# Patient Record
Sex: MALE | Race: WHITE | NOT HISPANIC OR LATINO | Employment: FULL TIME | URBAN - METROPOLITAN AREA
[De-identification: names, ages, dates, MRNs, and addresses within clinical notes are randomized per-mention and may not be internally consistent; named-entity substitution may affect disease eponyms.]

---

## 2023-08-28 ENCOUNTER — HOSPITAL ENCOUNTER (INPATIENT)
Facility: HOSPITAL | Age: 48
LOS: 2 days | Discharge: HOME | DRG: 247 | End: 2023-08-30
Attending: EMERGENCY MEDICINE | Admitting: INTERNAL MEDICINE
Payer: MEDICAID

## 2023-08-28 DIAGNOSIS — Z95.5 S/P CORONARY ARTERY STENT PLACEMENT: ICD-10-CM

## 2023-08-28 DIAGNOSIS — I21.3 ST ELEVATION MYOCARDIAL INFARCTION (STEMI), UNSPECIFIED ARTERY: ICD-10-CM

## 2023-08-28 DIAGNOSIS — F17.200 TOBACCO DEPENDENCE: ICD-10-CM

## 2023-08-28 DIAGNOSIS — I21.3 STEMI (ST ELEVATION MYOCARDIAL INFARCTION): Primary | ICD-10-CM

## 2023-08-28 DIAGNOSIS — R07.9 CHEST PAIN: ICD-10-CM

## 2023-08-28 DIAGNOSIS — I21.11 ST ELEVATION MYOCARDIAL INFARCTION INVOLVING RIGHT CORONARY ARTERY: ICD-10-CM

## 2023-08-28 DIAGNOSIS — I25.110 CORONARY ARTERY DISEASE INVOLVING NATIVE CORONARY ARTERY OF NATIVE HEART WITH UNSTABLE ANGINA PECTORIS: ICD-10-CM

## 2023-08-28 PROBLEM — R06.2 WHEEZING: Status: ACTIVE | Noted: 2023-08-28

## 2023-08-28 PROBLEM — Z72.0 TOBACCO USE: Status: ACTIVE | Noted: 2023-08-28

## 2023-08-28 LAB
ALBUMIN SERPL BCP-MCNC: 3.5 G/DL (ref 3.5–5.2)
ALP SERPL-CCNC: 64 U/L (ref 55–135)
ALT SERPL W/O P-5'-P-CCNC: 20 U/L (ref 10–44)
ANION GAP SERPL CALC-SCNC: 10 MMOL/L (ref 8–16)
APTT PPP: 23.9 SEC (ref 21–32)
AST SERPL-CCNC: 13 U/L (ref 10–40)
BASOPHILS # BLD AUTO: 0.06 K/UL (ref 0–0.2)
BASOPHILS NFR BLD: 0.4 % (ref 0–1.9)
BILIRUB SERPL-MCNC: 0.3 MG/DL (ref 0.1–1)
BNP SERPL-MCNC: <10 PG/ML (ref 0–99)
BUN SERPL-MCNC: 11 MG/DL (ref 6–20)
CALCIUM SERPL-MCNC: 7.8 MG/DL (ref 8.7–10.5)
CATH EF ESTIMATED: 45 %
CATH EF QUANTITATIVE: 45 %
CHLORIDE SERPL-SCNC: 106 MMOL/L (ref 95–110)
CK SERPL-CCNC: 72 U/L (ref 20–200)
CO2 SERPL-SCNC: 22 MMOL/L (ref 23–29)
CREAT SERPL-MCNC: 1 MG/DL (ref 0.5–1.4)
DIFFERENTIAL METHOD: ABNORMAL
EOSINOPHIL # BLD AUTO: 0.2 K/UL (ref 0–0.5)
EOSINOPHIL NFR BLD: 1.3 % (ref 0–8)
ERYTHROCYTE [DISTWIDTH] IN BLOOD BY AUTOMATED COUNT: 13.4 % (ref 11.5–14.5)
EST. GFR  (NO RACE VARIABLE): >60 ML/MIN/1.73 M^2
GLUCOSE SERPL-MCNC: 127 MG/DL (ref 70–110)
HCT VFR BLD AUTO: 50.3 % (ref 40–54)
HGB BLD-MCNC: 17.2 G/DL (ref 14–18)
IMM GRANULOCYTES # BLD AUTO: 0.13 K/UL (ref 0–0.04)
IMM GRANULOCYTES NFR BLD AUTO: 0.8 % (ref 0–0.5)
INR PPP: 1 (ref 0.8–1.2)
LYMPHOCYTES # BLD AUTO: 3.5 K/UL (ref 1–4.8)
LYMPHOCYTES NFR BLD: 21.1 % (ref 18–48)
MCH RBC QN AUTO: 32.1 PG (ref 27–31)
MCHC RBC AUTO-ENTMCNC: 34.2 G/DL (ref 32–36)
MCV RBC AUTO: 94 FL (ref 82–98)
MONOCYTES # BLD AUTO: 1.7 K/UL (ref 0.3–1)
MONOCYTES NFR BLD: 10.1 % (ref 4–15)
NEUTROPHILS # BLD AUTO: 10.9 K/UL (ref 1.8–7.7)
NEUTROPHILS NFR BLD: 66.3 % (ref 38–73)
NRBC BLD-RTO: 0 /100 WBC
PLATELET # BLD AUTO: 246 K/UL (ref 150–450)
PMV BLD AUTO: 9.7 FL (ref 9.2–12.9)
POC ACTIVATED CLOTTING TIME K: 257 SEC (ref 74–137)
POTASSIUM SERPL-SCNC: 3.7 MMOL/L (ref 3.5–5.1)
PROT SERPL-MCNC: 6.3 G/DL (ref 6–8.4)
PROTHROMBIN TIME: 10.6 SEC (ref 9–12.5)
RBC # BLD AUTO: 5.36 M/UL (ref 4.6–6.2)
SAMPLE: ABNORMAL
SODIUM SERPL-SCNC: 138 MMOL/L (ref 136–145)
TROPONIN I SERPL DL<=0.01 NG/ML-MCNC: 0.03 NG/ML (ref 0–0.03)
TSH SERPL DL<=0.005 MIU/L-ACNC: 1.75 UIU/ML (ref 0.4–4)
WBC # BLD AUTO: 16.41 K/UL (ref 3.9–12.7)

## 2023-08-28 PROCEDURE — 99152 MOD SED SAME PHYS/QHP 5/>YRS: CPT | Performed by: INTERNAL MEDICINE

## 2023-08-28 PROCEDURE — 85730 THROMBOPLASTIN TIME PARTIAL: CPT | Performed by: EMERGENCY MEDICINE

## 2023-08-28 PROCEDURE — 63600175 PHARM REV CODE 636 W HCPCS: Performed by: INTERNAL MEDICINE

## 2023-08-28 PROCEDURE — 84484 ASSAY OF TROPONIN QUANT: CPT | Performed by: EMERGENCY MEDICINE

## 2023-08-28 PROCEDURE — 25000003 PHARM REV CODE 250: Performed by: EMERGENCY MEDICINE

## 2023-08-28 PROCEDURE — 94640 AIRWAY INHALATION TREATMENT: CPT

## 2023-08-28 PROCEDURE — C1894 INTRO/SHEATH, NON-LASER: HCPCS | Performed by: INTERNAL MEDICINE

## 2023-08-28 PROCEDURE — 25500020 PHARM REV CODE 255: Performed by: INTERNAL MEDICINE

## 2023-08-28 PROCEDURE — 83880 ASSAY OF NATRIURETIC PEPTIDE: CPT | Performed by: EMERGENCY MEDICINE

## 2023-08-28 PROCEDURE — 36415 COLL VENOUS BLD VENIPUNCTURE: CPT | Performed by: EMERGENCY MEDICINE

## 2023-08-28 PROCEDURE — 99152 PR MOD CONSCIOUS SEDATION, SAME PHYS, 5+ YRS, FIRST 15 MIN: ICD-10-PCS | Mod: ,,, | Performed by: INTERNAL MEDICINE

## 2023-08-28 PROCEDURE — 84443 ASSAY THYROID STIM HORMONE: CPT | Performed by: EMERGENCY MEDICINE

## 2023-08-28 PROCEDURE — 92941 PR AMI ANY METHOD: ICD-10-PCS | Mod: RC,,, | Performed by: INTERNAL MEDICINE

## 2023-08-28 PROCEDURE — 25000003 PHARM REV CODE 250: Performed by: INTERNAL MEDICINE

## 2023-08-28 PROCEDURE — 94761 N-INVAS EAR/PLS OXIMETRY MLT: CPT

## 2023-08-28 PROCEDURE — 85610 PROTHROMBIN TIME: CPT | Performed by: EMERGENCY MEDICINE

## 2023-08-28 PROCEDURE — 27201423 OPTIME MED/SURG SUP & DEVICES STERILE SUPPLY: Performed by: INTERNAL MEDICINE

## 2023-08-28 PROCEDURE — 93458 L HRT ARTERY/VENTRICLE ANGIO: CPT | Mod: 59 | Performed by: INTERNAL MEDICINE

## 2023-08-28 PROCEDURE — C1760 CLOSURE DEV, VASC: HCPCS | Performed by: INTERNAL MEDICINE

## 2023-08-28 PROCEDURE — 93010 ELECTROCARDIOGRAM REPORT: CPT | Mod: 76,,, | Performed by: STUDENT IN AN ORGANIZED HEALTH CARE EDUCATION/TRAINING PROGRAM

## 2023-08-28 PROCEDURE — 93005 ELECTROCARDIOGRAM TRACING: CPT

## 2023-08-28 PROCEDURE — 93458 PR CATH PLACE/CORON ANGIO, IMG SUPER/INTERP,W LEFT HEART VENTRICULOGRAPHY: ICD-10-PCS | Mod: 26,59,51, | Performed by: INTERNAL MEDICINE

## 2023-08-28 PROCEDURE — C1769 GUIDE WIRE: HCPCS | Performed by: INTERNAL MEDICINE

## 2023-08-28 PROCEDURE — 99153 MOD SED SAME PHYS/QHP EA: CPT | Performed by: INTERNAL MEDICINE

## 2023-08-28 PROCEDURE — 93458 L HRT ARTERY/VENTRICLE ANGIO: CPT | Mod: 26,59,51, | Performed by: INTERNAL MEDICINE

## 2023-08-28 PROCEDURE — 99291 CRITICAL CARE FIRST HOUR: CPT

## 2023-08-28 PROCEDURE — C1887 CATHETER, GUIDING: HCPCS | Performed by: INTERNAL MEDICINE

## 2023-08-28 PROCEDURE — 20000000 HC ICU ROOM

## 2023-08-28 PROCEDURE — C1725 CATH, TRANSLUMIN NON-LASER: HCPCS | Performed by: INTERNAL MEDICINE

## 2023-08-28 PROCEDURE — 99152 MOD SED SAME PHYS/QHP 5/>YRS: CPT | Mod: ,,, | Performed by: INTERNAL MEDICINE

## 2023-08-28 PROCEDURE — 25000242 PHARM REV CODE 250 ALT 637 W/ HCPCS: Performed by: NURSE PRACTITIONER

## 2023-08-28 PROCEDURE — 82550 ASSAY OF CK (CPK): CPT | Performed by: EMERGENCY MEDICINE

## 2023-08-28 PROCEDURE — C9606 PERC D-E COR REVASC W AMI S: HCPCS | Mod: RC | Performed by: INTERNAL MEDICINE

## 2023-08-28 PROCEDURE — 92941 PRQ TRLML REVSC TOT OCCL AMI: CPT | Mod: RC,,, | Performed by: INTERNAL MEDICINE

## 2023-08-28 PROCEDURE — 85347 COAGULATION TIME ACTIVATED: CPT | Performed by: INTERNAL MEDICINE

## 2023-08-28 PROCEDURE — 99223 PR INITIAL HOSPITAL CARE,LEVL III: ICD-10-PCS | Mod: 25,,, | Performed by: INTERNAL MEDICINE

## 2023-08-28 PROCEDURE — 85025 COMPLETE CBC W/AUTO DIFF WBC: CPT | Performed by: EMERGENCY MEDICINE

## 2023-08-28 PROCEDURE — 93010 EKG 12-LEAD: ICD-10-PCS | Mod: ,,, | Performed by: STUDENT IN AN ORGANIZED HEALTH CARE EDUCATION/TRAINING PROGRAM

## 2023-08-28 PROCEDURE — C1874 STENT, COATED/COV W/DEL SYS: HCPCS | Performed by: INTERNAL MEDICINE

## 2023-08-28 PROCEDURE — 99223 1ST HOSP IP/OBS HIGH 75: CPT | Mod: 25,,, | Performed by: INTERNAL MEDICINE

## 2023-08-28 PROCEDURE — 80053 COMPREHEN METABOLIC PANEL: CPT | Performed by: EMERGENCY MEDICINE

## 2023-08-28 PROCEDURE — 93010 ELECTROCARDIOGRAM REPORT: CPT | Mod: ,,, | Performed by: STUDENT IN AN ORGANIZED HEALTH CARE EDUCATION/TRAINING PROGRAM

## 2023-08-28 DEVICE — EVEROLIMUS-ELUTING PLATINUM CHROMIUM CORONARY STENT SYSTEM
Type: IMPLANTABLE DEVICE | Site: HEART | Status: FUNCTIONAL
Brand: SYNERGY™ XD

## 2023-08-28 DEVICE — KIT ANGIO SEAL 6FR VIP: Type: IMPLANTABLE DEVICE | Site: GROIN | Status: FUNCTIONAL

## 2023-08-28 RX ORDER — METOPROLOL TARTRATE 25 MG/1
25 TABLET, FILM COATED ORAL 2 TIMES DAILY
Status: DISCONTINUED | OUTPATIENT
Start: 2023-08-28 | End: 2023-08-29

## 2023-08-28 RX ORDER — MIDAZOLAM HYDROCHLORIDE 1 MG/ML
INJECTION, SOLUTION INTRAMUSCULAR; INTRAVENOUS
Status: DISCONTINUED | OUTPATIENT
Start: 2023-08-28 | End: 2023-08-28 | Stop reason: HOSPADM

## 2023-08-28 RX ORDER — ONDANSETRON 8 MG/1
8 TABLET, ORALLY DISINTEGRATING ORAL EVERY 8 HOURS PRN
Status: DISCONTINUED | OUTPATIENT
Start: 2023-08-28 | End: 2023-08-30 | Stop reason: HOSPADM

## 2023-08-28 RX ORDER — IPRATROPIUM BROMIDE AND ALBUTEROL SULFATE 2.5; .5 MG/3ML; MG/3ML
3 SOLUTION RESPIRATORY (INHALATION) EVERY 6 HOURS
Status: DISCONTINUED | OUTPATIENT
Start: 2023-08-29 | End: 2023-08-29 | Stop reason: SDUPTHER

## 2023-08-28 RX ORDER — ASPIRIN 81 MG/1
81 TABLET ORAL DAILY
Status: DISCONTINUED | OUTPATIENT
Start: 2023-08-29 | End: 2023-08-30 | Stop reason: HOSPADM

## 2023-08-28 RX ORDER — FENTANYL CITRATE 50 UG/ML
INJECTION, SOLUTION INTRAMUSCULAR; INTRAVENOUS
Status: DISCONTINUED | OUTPATIENT
Start: 2023-08-28 | End: 2023-08-28 | Stop reason: HOSPADM

## 2023-08-28 RX ORDER — HEPARIN SODIUM 5000 [USP'U]/ML
5000 INJECTION, SOLUTION INTRAVENOUS; SUBCUTANEOUS
Status: DISCONTINUED | OUTPATIENT
Start: 2023-08-28 | End: 2023-08-28

## 2023-08-28 RX ORDER — FAMOTIDINE 20 MG/1
20 TABLET, FILM COATED ORAL 2 TIMES DAILY
Status: DISCONTINUED | OUTPATIENT
Start: 2023-08-28 | End: 2023-08-29

## 2023-08-28 RX ORDER — SODIUM CHLORIDE 9 MG/ML
INJECTION, SOLUTION INTRAVENOUS CONTINUOUS
Status: ACTIVE | OUTPATIENT
Start: 2023-08-28 | End: 2023-08-29

## 2023-08-28 RX ORDER — LIDOCAINE HYDROCHLORIDE 20 MG/ML
INJECTION, SOLUTION EPIDURAL; INFILTRATION; INTRACAUDAL; PERINEURAL
Status: DISCONTINUED | OUTPATIENT
Start: 2023-08-28 | End: 2023-08-28 | Stop reason: HOSPADM

## 2023-08-28 RX ORDER — ACETAMINOPHEN 325 MG/1
650 TABLET ORAL EVERY 4 HOURS PRN
Status: DISCONTINUED | OUTPATIENT
Start: 2023-08-28 | End: 2023-08-30 | Stop reason: HOSPADM

## 2023-08-28 RX ORDER — IPRATROPIUM BROMIDE AND ALBUTEROL SULFATE 2.5; .5 MG/3ML; MG/3ML
3 SOLUTION RESPIRATORY (INHALATION) EVERY 4 HOURS PRN
Status: DISCONTINUED | OUTPATIENT
Start: 2023-08-28 | End: 2023-08-30 | Stop reason: HOSPADM

## 2023-08-28 RX ORDER — ATORVASTATIN CALCIUM 40 MG/1
80 TABLET, FILM COATED ORAL DAILY
Status: DISCONTINUED | OUTPATIENT
Start: 2023-08-29 | End: 2023-08-30 | Stop reason: HOSPADM

## 2023-08-28 RX ORDER — ONDANSETRON 2 MG/ML
INJECTION INTRAMUSCULAR; INTRAVENOUS
Status: DISCONTINUED | OUTPATIENT
Start: 2023-08-28 | End: 2023-08-28 | Stop reason: HOSPADM

## 2023-08-28 RX ORDER — IBUPROFEN 200 MG
1 TABLET ORAL DAILY
Status: DISCONTINUED | OUTPATIENT
Start: 2023-08-29 | End: 2023-08-30 | Stop reason: HOSPADM

## 2023-08-28 RX ORDER — CEFAZOLIN SODIUM 1 G/3ML
INJECTION, POWDER, FOR SOLUTION INTRAMUSCULAR; INTRAVENOUS
Status: DISCONTINUED | OUTPATIENT
Start: 2023-08-28 | End: 2023-08-28 | Stop reason: HOSPADM

## 2023-08-28 RX ADMIN — SODIUM CHLORIDE: 9 INJECTION, SOLUTION INTRAVENOUS at 07:08

## 2023-08-28 RX ADMIN — IPRATROPIUM BROMIDE AND ALBUTEROL SULFATE 3 ML: 2.5; .5 SOLUTION RESPIRATORY (INHALATION) at 11:08

## 2023-08-28 RX ADMIN — METOPROLOL TARTRATE 25 MG: 25 TABLET, FILM COATED ORAL at 09:08

## 2023-08-28 RX ADMIN — FAMOTIDINE 20 MG: 20 TABLET, FILM COATED ORAL at 09:08

## 2023-08-28 RX ADMIN — SODIUM CHLORIDE 1000 ML: 9 INJECTION, SOLUTION INTRAVENOUS at 05:08

## 2023-08-28 RX ADMIN — SODIUM CHLORIDE: 9 INJECTION, SOLUTION INTRAVENOUS at 09:08

## 2023-08-28 NOTE — Clinical Note
140 ml of contrast were injected throughout the case. 0 mL of contrast was the total wasted during the case. 140 mL was the total amount used during the case.

## 2023-08-28 NOTE — H&P
Admit Note  Cardiology      SUBJECTIVE:     History of Present Illness:  Patient is a 48 y.o. male presents with via ems with chest pain left arm jaw pain of 2 hours duration he was brought via ems he has acute inferolateral stemi by ekg. He was rushed to cath lab for emergency pci.    Past Medical History:   Diagnosis Date    Coronary artery disease involving native coronary artery of native heart with unstable angina pectoris 8/28/2023    ST elevation myocardial infarction involving right coronary artery 8/28/2023    Tobacco use 8/28/2023     No past surgical history on file.  No family history on file.        Review of patient's allergies indicates:  No Known Allergies    Current Facility-Administered Medications   Medication    ceFAZolin injection    fentaNYL 50 mcg/mL injection    heparin (porcine) injection 5,000 Units    LIDOcaine (PF) 20 mg/ml (2%) injection    midazolam injection    ondansetron injection    ticagrelor tablet     No current facility-administered medications on file prior to encounter.     No current outpatient medications on file prior to encounter.         Review of Systems:  Constitutional: negative  Eyes: negative  Ears, nose, mouth, throat, and face: negative  Respiratory: negative  Cardiovascular: chest pain  Gastrointestinal: negative  Genitourinary:negative  Hematologic/lymphatic: negative  Musculoskeletal:negative,   Neurological: negative  Behavioral/Psych: negative  Endocrine: negative  Allergic/Immunologic: negative    OBJECTIVE:     Vital Signs (Most Recent)  Pulse: 76 (08/28/23 1754)  Resp: 19 (08/28/23 1748)  BP: 129/79 (08/28/23 1748)  SpO2: 97 % (08/28/23 1748)    Vital Signs Range (Last 24H):  Pulse:  [76-82]   Resp:  [19]   BP: (129)/(79)   SpO2:  [97 %]     Physical Exam:  General appearance: alert, appears stated age and cooperative  Head: Normocephalic, without obvious abnormality, atraumatic  Eyes:  conjunctivae/corneas clear. PERRL, EOM's intact. Fundi benign.  Nose:  "no discharge  Throat: normal findings: tongue midline and normal  Neck: no adenopathy, no carotid bruit, no JVD, supple, symmetrical, trachea midline and thyroid not enlarged, symmetric, no tenderness/mass/nodules  Back:  no skin lesions, erythema, or scars  Lungs:  clear to auscultation bilaterally  Chest wall: no tenderness  Heart: regular rate and rhythm, S1, S2 normal, no murmur, click, rub or gallop  Abdomen: soft, non-tender; bowel sounds normal; no masses,  no organomegaly  Extremities: extremities normal, atraumatic, no cyanosis or edema  Pulses: 2+ and symmetric  Skin: Skin color, texture, turgor normal. No rashes or lesions  Neurologic: Grossly normal    Laboratory:  Chemistry: No results found for: "NA", "K", "CL", "CO2", "BUN", "CREATININE", "GLUCOSE", "CALCIUM"  Cardiac Markers: No results found for: "CKTOTAL", "CKMB", "CKMBINDEX", "TROPONINI"  Cardiac Markers (Last 3): No results found for: "CKTOTAL", "CKMB", "CKMBINDEX", "TROPONINI"  CBC:   Lab Results   Component Value Date    WBC 16.41 (H) 08/28/2023    HGB 17.2 08/28/2023    HCT 50.3 08/28/2023    MCV 94 08/28/2023     08/28/2023     Lipids: No results found for: "CHOL", "TRIG", "HDL", "LDLDIRECT"  Coagulation:   Lab Results   Component Value Date    INR 1.0 08/28/2023    APTT 23.9 08/28/2023       Diagnostic Results:  ECG: Reviewed        ASSESSMENT/PLAN:     Patient Active Problem List   Diagnosis    ST elevation myocardial infarction involving right coronary artery    Coronary artery disease involving native coronary artery of native heart with unstable angina pectoris    Tobacco use        Plan: for emergency [facility] for direct pci for stemi.  Emergency consent  I have explained the risks, benefits , and alternatives of the procedure in detail.the patient voices understanding and all questions have been answered.the patient agrees to proceed as planned. .      "

## 2023-08-28 NOTE — Clinical Note
The catheter was inserted over the wire into the ostium   right coronary artery. Hemodynamics were performed.  An angiography was performed Multiple views were taken. Inserted over J wire

## 2023-08-28 NOTE — Clinical Note
The catheter was inserted over the wire into the ostium   left anterior descending. Hemodynamics were performed.  An angiography was performed of the left coronary arteries. Multiple views were taken. Over J wire

## 2023-08-28 NOTE — ED PROVIDER NOTES
SCRIBE #1 NOTE: I, Emilianaisreal Matos, am scribing for, and in the presence of, Tierra Lobo MD. I have scribed the entire note.       History   No chief complaint on file.    Review of patient's allergies indicates:  Not on File      History of Present Illness     HPI    8/28/2023, 5:54 PM  History obtained from the patient and EMS      History of Present Illness: Sawyer Hammond is a 48 y.o. male patient with no past cardiac history who presents to the Emergency Department for evaluation of chest pain which onset 2 hours PTA. Pt rates pain an 11/10 and states it radiates to his jaw. Pt was working outside all day and during symptom onset. Pt reports some marijuana use but denies EtOH or any other drug use. Symptoms are constant and moderate in severity. No mitigating or exacerbating factors reported. Associated sxs include diaphoresis and vomiting. Patient denies any SOB, fever, cough, headache, and all other sxs at this time. Prior Tx includes aspirin and 3 NTG. No further complaints or concerns at this time.       Arrival mode: EMS    PCP: No primary care provider on file.        Past Medical History:  No past medical history on file.    Past Surgical History:  No past surgical history on file.      Family History:  No family history on file.    Social History:  Social History     Tobacco Use    Smoking status: Not on file    Smokeless tobacco: Not on file   Substance and Sexual Activity    Alcohol use: Not on file    Drug use: Not on file    Sexual activity: Not on file        Review of Systems     Review of Systems   Constitutional:  Positive for diaphoresis. Negative for fever.   HENT:  Negative for sore throat.    Respiratory:  Negative for cough and shortness of breath.    Cardiovascular:  Positive for chest pain.   Gastrointestinal:  Positive for nausea.   Genitourinary:  Negative for dysuria.   Musculoskeletal:  Negative for back pain.   Skin:  Negative for rash.   Neurological:  Negative for  weakness and headaches.   Hematological:  Does not bruise/bleed easily.   All other systems reviewed and are negative.       Physical Exam     Initial Vitals [08/28/23 1748]   BP Pulse Resp Temp SpO2   129/79 82 19 -- 97 %      MAP       --          Physical Exam  Nursing Notes and Vital Signs Reviewed.  Constitutional: Patient is in mild distress. Well-developed and well-nourished. Diaphoretic.  Head: Atraumatic. Normocephalic.  Eyes: PERRL. EOM intact. Conjunctivae are not pale. No scleral icterus.  ENT: Mucous membranes are moist. Oropharynx is clear and symmetric.    Neck: Supple. Full ROM. No lymphadenopathy.  Cardiovascular: Bradycardia. Regular rhythm. No murmurs, rubs, or gallops. Distal pulses are 2+ and symmetric.  Pulmonary/Chest: No respiratory distress. Clear to auscultation bilaterally. No wheezing or rales.  Abdominal: Soft and non-distended.  There is no tenderness.  No rebound, guarding, or rigidity. Good bowel sounds.  Genitourinary: No CVA tenderness  Musculoskeletal: Moves all extremities. No obvious deformities. No edema. No calf tenderness.  Skin: Warm and dry.  Neurological:  Alert, awake, and appropriate.  Normal speech.  No acute focal neurological deficits are appreciated.  Psychiatric: Normal affect. Good eye contact. Appropriate in content.     ED Course   Critical Care    Date/Time: 8/28/2023 6:25 PM    Performed by: Tierra Lobo MD  Authorized by: Tierra Lobo MD  Direct patient critical care time: 25 minutes  Additional history critical care time: 6 minutes  Ordering / reviewing critical care time: 5 minutes  Documentation critical care time: 4 minutes  Consulting other physicians critical care time: 5 minutes  Total critical care time (exclusive of procedural time) : 45 minutes  Critical care time was exclusive of separately billable procedures and treating other patients and teaching time.  Critical care was necessary to treat or prevent imminent or life-threatening  "deterioration of the following conditions: cardiac failure.  Critical care was time spent personally by me on the following activities: blood draw for specimens, development of treatment plan with patient or surrogate, discussions with consultants, interpretation of cardiac output measurements, evaluation of patient's response to treatment, examination of patient, obtaining history from patient or surrogate, ordering and performing treatments and interventions, ordering and review of laboratory studies, ordering and review of radiographic studies, pulse oximetry, re-evaluation of patient's condition and review of old charts.        ED Vital Signs:  Vitals:    08/28/23 1748 08/28/23 1754   BP: 129/79    Pulse: 82 76   Resp: 19    SpO2: 97%    Height: 5' 1" (1.549 m)        Abnormal Lab Results:  Labs Reviewed   COMPREHENSIVE METABOLIC PANEL   TROPONIN I   TSH   CK   URINALYSIS, REFLEX TO URINE CULTURE   DRUG SCREEN PANEL, URINE EMERGENCY        All Lab Results:  Results for orders placed or performed during the hospital encounter of 08/28/23   CBC auto differential   Result Value Ref Range    WBC 16.41 (H) 3.90 - 12.70 K/uL    RBC 5.36 4.60 - 6.20 M/uL    Hemoglobin 17.2 14.0 - 18.0 g/dL    Hematocrit 50.3 40.0 - 54.0 %    MCV 94 82 - 98 fL    MCH 32.1 (H) 27.0 - 31.0 pg    MCHC 34.2 32.0 - 36.0 g/dL    RDW 13.4 11.5 - 14.5 %    Platelets 246 150 - 450 K/uL    MPV 9.7 9.2 - 12.9 fL    Immature Granulocytes 0.8 (H) 0.0 - 0.5 %    Gran # (ANC) 10.9 (H) 1.8 - 7.7 K/uL    Immature Grans (Abs) 0.13 (H) 0.00 - 0.04 K/uL    Lymph # 3.5 1.0 - 4.8 K/uL    Mono # 1.7 (H) 0.3 - 1.0 K/uL    Eos # 0.2 0.0 - 0.5 K/uL    Baso # 0.06 0.00 - 0.20 K/uL    nRBC 0 0 /100 WBC    Gran % 66.3 38.0 - 73.0 %    Lymph % 21.1 18.0 - 48.0 %    Mono % 10.1 4.0 - 15.0 %    Eosinophil % 1.3 0.0 - 8.0 %    Basophil % 0.4 0.0 - 1.9 %    Differential Method Automated    Troponin I #1   Result Value Ref Range    Troponin I <0.006 0.000 - 0.026 ng/mL "   BNP   Result Value Ref Range    BNP <10 0 - 99 pg/mL   Protime-INR   Result Value Ref Range    Prothrombin Time 10.6 9.0 - 12.5 sec    INR 1.0 0.8 - 1.2   APTT   Result Value Ref Range    aPTT 23.9 21.0 - 32.0 sec         Imaging Results:  Imaging Results              X-Ray Chest AP Portable (Final result)  Result time 08/28/23 18:30:59      Final result by Jean Claude Marte MD (08/28/23 18:30:59)                   Impression:      No acute abnormality.      Electronically signed by: Jean Claude Marte  Date:    08/28/2023  Time:    18:30               Narrative:    EXAMINATION:  XR CHEST AP PORTABLE    CLINICAL HISTORY:  Chest Pain;    TECHNIQUE:  Single frontal view of the chest was performed.    COMPARISON:  None    FINDINGS:  The lungs are clear, with normal appearance of pulmonary vasculature and no pleural effusion or pneumothorax.    The cardiac silhouette is normal in size. The hilar and mediastinal contours are unremarkable.    Bones are intact.                                       The EKG was ordered, reviewed, and independently interpreted by the ED provider.  Interpretation time: 17:51  Rate: 72 BPM  Rhythm: normal sinus rhythm  Interpretation: ST elevation, consider inferior injury or acute infarct. Acute MI/STEMI. Consider right ventricular involvement in acute inferior infarct.         The Emergency Provider reviewed the vital signs and test results, which are outlined above.     ED Discussion     5:40 PM:  STEMI activation, Dr. Dodson has reviewed ECG,will repeat ECG upon arrival.    17:52  Repeat ECG shows st elevation in inferior leads with reciprocal changes, Dr. Dodson aware agrees with taking patient to cath lab.     6:23 PM: Discussed case with Leola Dodson MD (Cardiology). Dr. Dodson agrees with current care and management of pt and accepts admission.   Admitting Service: Cardiology  Admitting Physician: Dr. Dodson  Admit to: cath lab    6:25 PM: Re-evaluated pt. I have discussed test results,  shared treatment plan, and the need for admission with patient and family at bedside. Pt and family express understanding at this time and agree with all information. All questions answered. Pt and family have no further questions or concerns at this time. Pt is ready for admit.         Medical Decision Making  ACS  CHF  PTX  Dissection    Hx of tobacco and marijuana abuse, presents with left sided chest pain radiating to jaw and left arm, nausea, diaphoresis, while working outside, 12 lead from EMS shows inferior wall changes consistent with STEMI, code stemi called, patient given aspirin, nitro and fentanyl prior to arrival, still c/o active chest pain, repeat ECG confirms st elevation in inferior leads, lab work and CXR ordered, interventional cardiology will take patient to cath lab, his vital signs are stable, given 5,000 units of heparin, CXR normal, lab work pending, patient will be admitted to the cath lab.     Amount and/or Complexity of Data Reviewed  Labs: ordered.  Radiology: ordered.  ECG/medicine tests: ordered.    Risk  Prescription drug management.  Decision regarding hospitalization.  Emergency major surgery.       Additional MDM:   Smoking Cessation: The patient is a smoker. The patient was counseled on smoking cessation for: 4 minutes. The patient was counseled on tobacco related  health complications.           ED Medication(s):  Medications   sodium chloride 0.9% bolus 1,000 mL 1,000 mL (0 mLs Intravenous Stopped 8/28/23 1851)   heparin (porcine) injection 5,000 Units (has no administration in time range)   LIDOcaine (PF) 20 mg/ml (2%) injection (3 mLs Subcutaneous Given 8/28/23 1820)   midazolam injection (1 mg Intravenous Given 8/28/23 1842)   fentaNYL 50 mcg/mL injection (50 mcg Intravenous Given 8/28/23 1835)   ondansetron injection (4 mg Intravenous Given 8/28/23 1815)   ticagrelor tablet (180 mg Oral Given 8/28/23 1831)       There are no discharge medications for this patient.               Scribe Attestation:   Scribe #1: I performed the above scribed service and the documentation accurately describes the services I performed. I attest to the accuracy of the note.     Attending:   Physician Attestation Statement for Scribe #1: I, Tierra Lobo MD, personally performed the services described in this documentation, as scribed by Emiliana Matos, in my presence, and it is both accurate and complete.           Clinical Impression       ICD-10-CM ICD-9-CM   1. STEMI (ST elevation myocardial infarction)  I21.3 410.90   2. Chest pain  R07.9 786.50   3. ST elevation myocardial infarction (STEMI), unspecified artery  I21.3 410.90       Disposition:   Disposition: Admitted  Condition: Serious         Tierra Lobo MD  08/28/23 5104

## 2023-08-28 NOTE — Clinical Note
The catheter was inserted over the wire into the distal   right coronary artery. Aspiration thrombectomy done

## 2023-08-28 NOTE — Clinical Note
The catheter was inserted over the wire into the left ventricle. Hemodynamics were performed.  and Pullback was recorded.  The angiography was performed via power injection. The injected amount was 30 mL contrast at 12 mL/s.

## 2023-08-29 PROBLEM — F17.200 TOBACCO DEPENDENCE: Status: ACTIVE | Noted: 2023-08-28

## 2023-08-29 LAB
ANION GAP SERPL CALC-SCNC: 10 MMOL/L (ref 8–16)
AORTIC ROOT ANNULUS: 2.86 CM
ASCENDING AORTA: 3.14 CM
AV INDEX (PROSTH): 0.84
AV MEAN GRADIENT: 4 MMHG
AV PEAK GRADIENT: 5 MMHG
AV VALVE AREA BY VELOCITY RATIO: 3.12 CM²
AV VALVE AREA: 3.27 CM²
AV VELOCITY RATIO: 0.8
BASOPHILS # BLD AUTO: 0.05 K/UL (ref 0–0.2)
BASOPHILS NFR BLD: 0.4 % (ref 0–1.9)
BSA FOR ECHO PROCEDURE: 2.07 M2
BUN SERPL-MCNC: 10 MG/DL (ref 6–20)
CALCIUM SERPL-MCNC: 8.7 MG/DL (ref 8.7–10.5)
CHLORIDE SERPL-SCNC: 108 MMOL/L (ref 95–110)
CHOLEST SERPL-MCNC: 190 MG/DL (ref 120–199)
CHOLEST/HDLC SERPL: 5.6 {RATIO} (ref 2–5)
CO2 SERPL-SCNC: 21 MMOL/L (ref 23–29)
CREAT SERPL-MCNC: 0.9 MG/DL (ref 0.5–1.4)
CV ECHO LV RWT: 0.41 CM
DIFFERENTIAL METHOD: ABNORMAL
DOP CALC AO PEAK VEL: 1.15 M/S
DOP CALC AO VTI: 26.9 CM
DOP CALC LVOT AREA: 3.9 CM2
DOP CALC LVOT DIAMETER: 2.23 CM
DOP CALC LVOT PEAK VEL: 0.92 M/S
DOP CALC LVOT STROKE VOLUME: 87.83 CM3
DOP CALC RVOT PEAK VEL: 0.63 M/S
DOP CALC RVOT VTI: 15.3 CM
DOP CALCLVOT PEAK VEL VTI: 22.5 CM
E WAVE DECELERATION TIME: 319.97 MSEC
E/A RATIO: 1.3
E/E' RATIO: 7.83 M/S
ECHO LV POSTERIOR WALL: 1.03 CM (ref 0.6–1.1)
EOSINOPHIL # BLD AUTO: 0.2 K/UL (ref 0–0.5)
EOSINOPHIL NFR BLD: 1.3 % (ref 0–8)
ERYTHROCYTE [DISTWIDTH] IN BLOOD BY AUTOMATED COUNT: 13.4 % (ref 11.5–14.5)
EST. GFR  (NO RACE VARIABLE): >60 ML/MIN/1.73 M^2
FRACTIONAL SHORTENING: 26 % (ref 28–44)
GLUCOSE SERPL-MCNC: 97 MG/DL (ref 70–110)
HCT VFR BLD AUTO: 48.9 % (ref 40–54)
HDLC SERPL-MCNC: 34 MG/DL (ref 40–75)
HDLC SERPL: 17.9 % (ref 20–50)
HGB BLD-MCNC: 16.4 G/DL (ref 14–18)
IMM GRANULOCYTES # BLD AUTO: 0.08 K/UL (ref 0–0.04)
IMM GRANULOCYTES NFR BLD AUTO: 0.6 % (ref 0–0.5)
INTERVENTRICULAR SEPTUM: 1.13 CM (ref 0.6–1.1)
IVC DIAMETER: 1.74 CM
IVRT: 91.34 MSEC
LA MAJOR: 3.76 CM
LA MINOR: 3.11 CM
LA WIDTH: 2.3 CM
LDLC SERPL CALC-MCNC: 117.6 MG/DL (ref 63–159)
LEFT ATRIUM SIZE: 3.39 CM
LEFT ATRIUM VOLUME INDEX: 11 ML/M2
LEFT ATRIUM VOLUME: 22.56 CM3
LEFT INTERNAL DIMENSION IN SYSTOLE: 3.68 CM (ref 2.1–4)
LEFT VENTRICLE DIASTOLIC VOLUME INDEX: 57.06 ML/M2
LEFT VENTRICLE DIASTOLIC VOLUME: 116.98 ML
LEFT VENTRICLE MASS INDEX: 98 G/M2
LEFT VENTRICLE SYSTOLIC VOLUME INDEX: 27.9 ML/M2
LEFT VENTRICLE SYSTOLIC VOLUME: 57.2 ML
LEFT VENTRICULAR INTERNAL DIMENSION IN DIASTOLE: 4.98 CM (ref 3.5–6)
LEFT VENTRICULAR MASS: 200.69 G
LV LATERAL E/E' RATIO: 7.5 M/S
LV SEPTAL E/E' RATIO: 8.18 M/S
LVOT MG: 2.49 MMHG
LVOT MV: 0.77 CM/S
LYMPHOCYTES # BLD AUTO: 3.6 K/UL (ref 1–4.8)
LYMPHOCYTES NFR BLD: 26.3 % (ref 18–48)
MCH RBC QN AUTO: 31.4 PG (ref 27–31)
MCHC RBC AUTO-ENTMCNC: 33.5 G/DL (ref 32–36)
MCV RBC AUTO: 94 FL (ref 82–98)
MONOCYTES # BLD AUTO: 1.5 K/UL (ref 0.3–1)
MONOCYTES NFR BLD: 10.6 % (ref 4–15)
MV PEAK A VEL: 0.69 M/S
MV PEAK E VEL: 0.9 M/S
MV STENOSIS PRESSURE HALF TIME: 92.79 MS
MV VALVE AREA P 1/2 METHOD: 2.37 CM2
NEUTROPHILS # BLD AUTO: 8.4 K/UL (ref 1.8–7.7)
NEUTROPHILS NFR BLD: 60.8 % (ref 38–73)
NONHDLC SERPL-MCNC: 156 MG/DL
NRBC BLD-RTO: 0 /100 WBC
PLATELET # BLD AUTO: 210 K/UL (ref 150–450)
PMV BLD AUTO: 9.9 FL (ref 9.2–12.9)
POCT GLUCOSE: 102 MG/DL (ref 70–110)
POCT GLUCOSE: 140 MG/DL (ref 70–110)
POCT GLUCOSE: 165 MG/DL (ref 70–110)
POTASSIUM SERPL-SCNC: 3.9 MMOL/L (ref 3.5–5.1)
PV MEAN GRADIENT: 1 MMHG
RA MAJOR: 3.45 CM
RA PRESSURE ESTIMATED: 3 MMHG
RA WIDTH: 2.6 CM
RBC # BLD AUTO: 5.23 M/UL (ref 4.6–6.2)
SODIUM SERPL-SCNC: 139 MMOL/L (ref 136–145)
STJ: 3.33 CM
TDI LATERAL: 0.12 M/S
TDI SEPTAL: 0.11 M/S
TDI: 0.12 M/S
TRICUSPID ANNULAR PLANE SYSTOLIC EXCURSION: 2.5 CM
TRIGL SERPL-MCNC: 192 MG/DL (ref 30–150)
TROPONIN I SERPL DL<=0.01 NG/ML-MCNC: 2.92 NG/ML (ref 0–0.03)
TROPONIN I SERPL DL<=0.01 NG/ML-MCNC: 4.21 NG/ML (ref 0–0.03)
TROPONIN I SERPL DL<=0.01 NG/ML-MCNC: 5.86 NG/ML (ref 0–0.03)
WBC # BLD AUTO: 13.75 K/UL (ref 3.9–12.7)
Z-SCORE OF LEFT VENTRICULAR DIMENSION IN END DIASTOLE: -2.11
Z-SCORE OF LEFT VENTRICULAR DIMENSION IN END SYSTOLE: -0.18

## 2023-08-29 PROCEDURE — 36415 COLL VENOUS BLD VENIPUNCTURE: CPT | Performed by: INTERNAL MEDICINE

## 2023-08-29 PROCEDURE — 80048 BASIC METABOLIC PNL TOTAL CA: CPT | Performed by: INTERNAL MEDICINE

## 2023-08-29 PROCEDURE — S4991 NICOTINE PATCH NONLEGEND: HCPCS | Performed by: INTERNAL MEDICINE

## 2023-08-29 PROCEDURE — 94640 AIRWAY INHALATION TREATMENT: CPT

## 2023-08-29 PROCEDURE — 93010 EKG 12-LEAD: ICD-10-PCS | Mod: ,,, | Performed by: STUDENT IN AN ORGANIZED HEALTH CARE EDUCATION/TRAINING PROGRAM

## 2023-08-29 PROCEDURE — 99233 PR SUBSEQUENT HOSPITAL CARE,LEVL III: ICD-10-PCS | Mod: 25,,, | Performed by: STUDENT IN AN ORGANIZED HEALTH CARE EDUCATION/TRAINING PROGRAM

## 2023-08-29 PROCEDURE — 21400001 HC TELEMETRY ROOM

## 2023-08-29 PROCEDURE — 84484 ASSAY OF TROPONIN QUANT: CPT | Mod: 91 | Performed by: PHYSICIAN ASSISTANT

## 2023-08-29 PROCEDURE — 63600175 PHARM REV CODE 636 W HCPCS: Performed by: PHYSICIAN ASSISTANT

## 2023-08-29 PROCEDURE — 84484 ASSAY OF TROPONIN QUANT: CPT | Mod: 91 | Performed by: STUDENT IN AN ORGANIZED HEALTH CARE EDUCATION/TRAINING PROGRAM

## 2023-08-29 PROCEDURE — 80061 LIPID PANEL: CPT | Performed by: INTERNAL MEDICINE

## 2023-08-29 PROCEDURE — 25000003 PHARM REV CODE 250: Performed by: INTERNAL MEDICINE

## 2023-08-29 PROCEDURE — 25000003 PHARM REV CODE 250: Performed by: STUDENT IN AN ORGANIZED HEALTH CARE EDUCATION/TRAINING PROGRAM

## 2023-08-29 PROCEDURE — 99233 SBSQ HOSP IP/OBS HIGH 50: CPT | Mod: 25,,, | Performed by: STUDENT IN AN ORGANIZED HEALTH CARE EDUCATION/TRAINING PROGRAM

## 2023-08-29 PROCEDURE — 25000003 PHARM REV CODE 250: Performed by: PHYSICIAN ASSISTANT

## 2023-08-29 PROCEDURE — 25000242 PHARM REV CODE 250 ALT 637 W/ HCPCS: Performed by: NURSE PRACTITIONER

## 2023-08-29 PROCEDURE — 36415 COLL VENOUS BLD VENIPUNCTURE: CPT | Performed by: PHYSICIAN ASSISTANT

## 2023-08-29 PROCEDURE — 93010 ELECTROCARDIOGRAM REPORT: CPT | Mod: ,,, | Performed by: STUDENT IN AN ORGANIZED HEALTH CARE EDUCATION/TRAINING PROGRAM

## 2023-08-29 PROCEDURE — 36415 COLL VENOUS BLD VENIPUNCTURE: CPT | Performed by: STUDENT IN AN ORGANIZED HEALTH CARE EDUCATION/TRAINING PROGRAM

## 2023-08-29 PROCEDURE — 25000242 PHARM REV CODE 250 ALT 637 W/ HCPCS: Performed by: PHYSICIAN ASSISTANT

## 2023-08-29 PROCEDURE — 93005 ELECTROCARDIOGRAM TRACING: CPT

## 2023-08-29 PROCEDURE — 25000003 PHARM REV CODE 250: Performed by: NURSE PRACTITIONER

## 2023-08-29 PROCEDURE — 85025 COMPLETE CBC W/AUTO DIFF WBC: CPT | Performed by: INTERNAL MEDICINE

## 2023-08-29 RX ORDER — METOPROLOL TARTRATE 50 MG/1
50 TABLET ORAL 2 TIMES DAILY
Status: DISCONTINUED | OUTPATIENT
Start: 2023-08-29 | End: 2023-08-30 | Stop reason: HOSPADM

## 2023-08-29 RX ORDER — MUPIROCIN 20 MG/G
OINTMENT TOPICAL 2 TIMES DAILY
Status: DISCONTINUED | OUTPATIENT
Start: 2023-08-29 | End: 2023-08-30 | Stop reason: HOSPADM

## 2023-08-29 RX ORDER — ARFORMOTEROL TARTRATE 15 UG/2ML
15 SOLUTION RESPIRATORY (INHALATION) 2 TIMES DAILY
Status: DISCONTINUED | OUTPATIENT
Start: 2023-08-29 | End: 2023-08-30 | Stop reason: HOSPADM

## 2023-08-29 RX ORDER — BUDESONIDE 0.5 MG/2ML
0.5 INHALANT ORAL EVERY 12 HOURS
Status: DISCONTINUED | OUTPATIENT
Start: 2023-08-29 | End: 2023-08-30 | Stop reason: HOSPADM

## 2023-08-29 RX ORDER — POTASSIUM CHLORIDE 20 MEQ/1
40 TABLET, EXTENDED RELEASE ORAL ONCE
Status: COMPLETED | OUTPATIENT
Start: 2023-08-29 | End: 2023-08-29

## 2023-08-29 RX ORDER — ENOXAPARIN SODIUM 100 MG/ML
40 INJECTION SUBCUTANEOUS EVERY 24 HOURS
Status: DISCONTINUED | OUTPATIENT
Start: 2023-08-29 | End: 2023-08-30 | Stop reason: HOSPADM

## 2023-08-29 RX ORDER — LOSARTAN POTASSIUM 25 MG/1
25 TABLET ORAL DAILY
Status: DISCONTINUED | OUTPATIENT
Start: 2023-08-30 | End: 2023-08-30 | Stop reason: HOSPADM

## 2023-08-29 RX ADMIN — TICAGRELOR 90 MG: 90 TABLET ORAL at 08:08

## 2023-08-29 RX ADMIN — METOPROLOL TARTRATE 25 MG: 25 TABLET, FILM COATED ORAL at 08:08

## 2023-08-29 RX ADMIN — ASPIRIN 81 MG: 81 TABLET, COATED ORAL at 08:08

## 2023-08-29 RX ADMIN — BUDESONIDE 0.5 MG: 0.5 INHALANT ORAL at 07:08

## 2023-08-29 RX ADMIN — METOPROLOL TARTRATE 50 MG: 50 TABLET, FILM COATED ORAL at 09:08

## 2023-08-29 RX ADMIN — FAMOTIDINE 20 MG: 20 TABLET, FILM COATED ORAL at 08:08

## 2023-08-29 RX ADMIN — ENOXAPARIN SODIUM 40 MG: 40 INJECTION SUBCUTANEOUS at 04:08

## 2023-08-29 RX ADMIN — NICOTINE 1 PATCH: 21 PATCH, EXTENDED RELEASE TRANSDERMAL at 08:08

## 2023-08-29 RX ADMIN — MUPIROCIN: 20 OINTMENT TOPICAL at 09:08

## 2023-08-29 RX ADMIN — ATORVASTATIN CALCIUM 80 MG: 40 TABLET, FILM COATED ORAL at 08:08

## 2023-08-29 RX ADMIN — ARFORMOTEROL TARTRATE 15 MCG: 15 SOLUTION RESPIRATORY (INHALATION) at 07:08

## 2023-08-29 RX ADMIN — MUPIROCIN: 20 OINTMENT TOPICAL at 08:08

## 2023-08-29 RX ADMIN — POTASSIUM CHLORIDE 40 MEQ: 1500 TABLET, EXTENDED RELEASE ORAL at 08:08

## 2023-08-29 RX ADMIN — TICAGRELOR 90 MG: 90 TABLET ORAL at 09:08

## 2023-08-29 RX ADMIN — IPRATROPIUM BROMIDE AND ALBUTEROL SULFATE 3 ML: .5; 3 SOLUTION RESPIRATORY (INHALATION) at 07:08

## 2023-08-29 NOTE — SUBJECTIVE & OBJECTIVE
Review of Systems   Constitutional: Positive for malaise/fatigue.   HENT: Negative.     Eyes: Negative.    Cardiovascular: Negative.    Respiratory: Negative.     Endocrine: Negative.    Hematologic/Lymphatic: Negative.    Skin: Negative.    Musculoskeletal: Negative.    Gastrointestinal: Negative.    Genitourinary: Negative.    Neurological: Negative.    Psychiatric/Behavioral: Negative.     Allergic/Immunologic: Negative.      Objective:     Vital Signs (Most Recent):  Temp: 98.7 °F (37.1 °C) (08/29/23 1105)  Pulse: 76 (08/29/23 1153)  Resp: 19 (08/29/23 1153)  BP: 116/88 (08/29/23 1105)  SpO2: (!) 93 % (08/29/23 1153) Vital Signs (24h Range):  Temp:  [98.5 °F (36.9 °C)-98.8 °F (37.1 °C)] 98.7 °F (37.1 °C)  Pulse:  [72-90] 76  Resp:  [16-50] 19  SpO2:  [82 %-97 %] 93 %  BP: (116-147)/(68-93) 116/88     Weight: 87.1 kg (192 lb)  Body mass index is 27.55 kg/m².     SpO2: (!) 93 %         Intake/Output Summary (Last 24 hours) at 8/29/2023 1228  Last data filed at 8/29/2023 1152  Gross per 24 hour   Intake 1399.27 ml   Output 1975 ml   Net -575.73 ml       Lines/Drains/Airways       Peripheral Intravenous Line  Duration                  Peripheral IV - Single Lumen 08/29/23 0705 20 G Anterior;Left Forearm <1 day                       Physical Exam  Vitals and nursing note reviewed.   Constitutional:       General: He is not in acute distress.     Appearance: Normal appearance. He is well-developed. He is obese. He is not diaphoretic.   HENT:      Head: Normocephalic and atraumatic.   Eyes:      General:         Right eye: No discharge.         Left eye: No discharge.      Pupils: Pupils are equal, round, and reactive to light.   Neck:      Thyroid: No thyromegaly.      Vascular: No JVD.      Trachea: No tracheal deviation.   Cardiovascular:      Rate and Rhythm: Normal rate and regular rhythm.      Heart sounds: Normal heart sounds, S1 normal and S2 normal. No murmur heard.  Pulmonary:      Effort: Pulmonary  effort is normal. No respiratory distress.      Breath sounds: Normal breath sounds. No wheezing or rales.   Abdominal:      General: There is no distension.      Palpations: Abdomen is soft.      Tenderness: There is no rebound.   Musculoskeletal:      Cervical back: Neck supple.   Skin:     General: Skin is warm and dry.      Findings: No erythema.      Comments: Groin access site C/D/I; no bleeding erythema drainage or hematoma   Neurological:      General: No focal deficit present.      Mental Status: He is alert and oriented to person, place, and time.   Psychiatric:         Mood and Affect: Mood normal.         Behavior: Behavior normal.         Thought Content: Thought content normal.            Significant Labs: CMP   Recent Labs   Lab 08/28/23  1845 08/29/23  0344    139   K 3.7 3.9    108   CO2 22* 21*   * 97   BUN 11 10   CREATININE 1.0 0.9   CALCIUM 7.8* 8.7   PROT 6.3  --    ALBUMIN 3.5  --    BILITOT 0.3  --    ALKPHOS 64  --    AST 13  --    ALT 20  --    ANIONGAP 10 10   , CBC   Recent Labs   Lab 08/28/23  1759 08/29/23  0344   WBC 16.41* 13.75*   HGB 17.2 16.4   HCT 50.3 48.9    210   , Troponin   Recent Labs   Lab 08/28/23  1845 08/29/23  0828   TROPONINI 0.031* 5.862*   , and All pertinent lab results from the last 24 hours have been reviewed.    Significant Imaging: Echocardiogram: Transthoracic echo (TTE) complete (Cupid Only):   Results for orders placed or performed during the hospital encounter of 08/28/23   Echo   Result Value Ref Range    BSA 2.07 m2    LVOT stroke volume 87.83 cm3    LVIDd 4.98 3.5 - 6.0 cm    LV Systolic Volume 57.20 mL    LV Systolic Volume Index 27.9 mL/m2    LVIDs 3.68 2.1 - 4.0 cm    LV Diastolic Volume 116.98 mL    LV Diastolic Volume Index 57.06 mL/m2    IVS 1.13 (A) 0.6 - 1.1 cm    LVOT diameter 2.23 cm    LVOT area 3.9 cm2    FS 26 (A) 28 - 44 %    Left Ventricle Relative Wall Thickness 0.41 cm    Posterior Wall 1.03 0.6 - 1.1 cm    LV  mass 200.69 g    LV Mass Index 98 g/m2    MV Peak E Shaquille 0.90 m/s    TDI LATERAL 0.12 m/s    TDI SEPTAL 0.11 m/s    E/E' ratio 7.83 m/s    MV Peak A Shaquille 0.69 m/s    E/A ratio 1.30     IVRT 91.34 msec    E wave deceleration time 319.97 msec    LV SEPTAL E/E' RATIO 8.18 m/s    LV LATERAL E/E' RATIO 7.50 m/s    LVOT peak shaquille 0.92 m/s    Left Ventricular Outflow Tract Mean Velocity 0.77 cm/s    Left Ventricular Outflow Tract Mean Gradient 2.49 mmHg    LA size 3.39 cm    Left Atrium Major Axis 3.76 cm    Left Atrium Minor Axis 3.11 cm    RVOT peak VTI 15.3 cm    RA Major Axis 3.45 cm    AV mean gradient 4 mmHg    AV peak gradient 5 mmHg    Ao peak shaquille 1.15 m/s    Ao VTI 26.90 cm    LVOT peak VTI 22.50 cm    AV valve area 3.27 cm²    AV Velocity Ratio 0.80     AV index (prosthetic) 0.84     SUSY by Velocity Ratio 3.12 cm²    MV stenosis pressure 1/2 time 92.79 ms    MV valve area p 1/2 method 2.37 cm2    PV mean gradient 1 mmHg    RVOT peak shaquille 0.63 m/s    Ao root annulus 2.86 cm    STJ 3.33 cm    Ascending aorta 3.14 cm    IVC diameter 1.74 cm    Mean e' 0.12 m/s    ZLVIDS -0.18     ZLVIDD -2.11     LA Volume Index 11.0 mL/m2    LA volume 22.56 cm3    LA WIDTH 2.3 cm    TAPSE 2.50 cm    RA Width 2.6 cm    Est. RA pres 3 mmHg    Narrative      Left Ventricle: The left ventricle is normal in size. Normal wall   thickness. regional wall motion abnormalities present. There is mildly   reduced systolic function with a visually estimated ejection fraction of   45 - 50%. There is normal diastolic function.    Right Ventricle: Normal right ventricular cavity size. Wall thickness   is normal. Right ventricle wall motion  is normal. Systolic function is   normal.    Aortic Valve: The aortic valve is a trileaflet valve. There is no   stenosis. Aortic valve peak velocity is 1.15 m/s. Mean gradient is 4 mmHg.   There is no significant regurgitation.    Mitral Valve: There is no stenosis. There is no significant   regurgitation.     Tricuspid Valve: There is no significant regurgitation.    IVC/SVC: Normal venous pressure at 3 mmHg.     , EKG: Reviewed, and X-Ray: CXR: X-Ray Chest 1 View (CXR): No results found for this visit on 08/28/23. and X-Ray Chest PA and Lateral (CXR): No results found for this visit on 08/28/23.

## 2023-08-29 NOTE — ASSESSMENT & PLAN NOTE
Patient admitted to ICU per Dr. Dodson s/p [facility] , found to have occluded distal RCA which was treated with thrombectomy and stenting  Bedrest  Monitor site  No chest pain post-procedure  Cardiac monitoring

## 2023-08-29 NOTE — ASSESSMENT & PLAN NOTE
No formal COPD diagnosis; reports using a friends albuterol     · Endorses at least a 2-pack per day smoking history  · Would benefit from pulmonary evaluation upon discharge  · Continue scheduled nebs

## 2023-08-29 NOTE — SUBJECTIVE & OBJECTIVE
"Interval History:   Denies acute chest pain complaints this morning or since his time of admission  Hemodynamics stable  Some wheezing noted on exam  Smoking cessation was discussed; however, patient reports he will not likely stop smoking upon discharge  Medication compliance was discussed as well; however, patient reports he will "try my best to take my meds"    Objective:     Vital Signs (Most Recent):  Temp: 98.8 °F (37.1 °C) (08/29/23 0705)  Pulse: 82 (08/29/23 0811)  Resp: 19 (08/29/23 0811)  BP: 134/79 (08/29/23 0805)  SpO2: (!) 94 % (08/29/23 0811) Vital Signs (24h Range):  Temp:  [98.5 °F (36.9 °C)-98.8 °F (37.1 °C)] 98.8 °F (37.1 °C)  Pulse:  [72-90] 82  Resp:  [16-50] 19  SpO2:  [82 %-97 %] 94 %  BP: (124-147)/(71-93) 134/79   Weight: 87.1 kg (192 lb); Body mass index is 27.55 kg/m².    Intake/Output Summary (Last 24 hours) at 8/29/2023 0814  Last data filed at 8/29/2023 0811  Gross per 24 hour   Intake 1399.27 ml   Output 1625 ml   Net -225.73 ml      Physical Exam  Vitals and nursing note reviewed.   HENT:      Head: Normocephalic.   Eyes:      Conjunctiva/sclera: Conjunctivae normal.   Cardiovascular:      Rate and Rhythm: Normal rate.   Pulmonary:      Effort: Pulmonary effort is normal.      Breath sounds: Wheezing present.   Abdominal:      Palpations: Abdomen is soft.   Musculoskeletal:         General: Normal range of motion.      Cervical back: Normal range of motion and neck supple.   Skin:     General: Skin is warm and dry.   Neurological:      Mental Status: He is alert and oriented to person, place, and time.   Psychiatric:         Mood and Affect: Mood normal.         Review of Systems: Negative except as indicated in HPI    Significant Labs:  CBC/Anemia Profile:  Recent Labs   Lab 08/28/23  1759 08/29/23  0344   WBC 16.41* 13.75*   HGB 17.2 16.4   HCT 50.3 48.9    210   MCV 94 94   RDW 13.4 13.4   Chemistries:  Recent Labs   Lab 08/28/23  1845 08/29/23  0344    139   K 3.7 3.9 "    108   CO2 22* 21*   BUN 11 10   CREATININE 1.0 0.9   CALCIUM 7.8* 8.7   ALBUMIN 3.5  --    PROT 6.3  --    BILITOT 0.3  --    ALKPHOS 64  --    ALT 20  --    AST 13  --

## 2023-08-29 NOTE — PLAN OF CARE
Patient awake, alert and oriented x 4. No complaints of chest pain s/p heart cath. PIV x 1 with maintenance fluids infusing at 100 ml/hr. Patient voids to urinal. Puncture site clean, dry and intact. Call light in reach, no overt issues over night.    Problem: Arrhythmia/Dysrhythmia (Cardiac Catheterization)  Goal: Stable Heart Rate and Rhythm  Outcome: Ongoing, Progressing  Intervention: Monitor and Manage Cardiac Rhythm Effect  Flowsheets (Taken 8/29/2023 0604)  Fluid/Electrolyte Management:   fluids provided   intravenous fluids adjusted     Problem: Contrast-Induced Injury Risk (Cardiac Catheterization)  Goal: Absence of Contrast-Induced Injury  Outcome: Ongoing, Progressing     Problem: Pain (Cardiac Catheterization)  Goal: Acceptable Pain Control  Outcome: Ongoing, Progressing  Intervention: Prevent or Manage Pain  Flowsheets (Taken 8/29/2023 0604)  Pain Management Interventions: pain management plan reviewed with patient/caregiver

## 2023-08-29 NOTE — SUBJECTIVE & OBJECTIVE
Past Medical History:   Diagnosis Date    Coronary artery disease involving native coronary artery of native heart with unstable angina pectoris 8/28/2023    ST elevation myocardial infarction involving right coronary artery 8/28/2023    Tobacco use 8/28/2023       No past surgical history on file.    Review of patient's allergies indicates:  No Known Allergies    Family History    None       Tobacco Use    Smoking status: Not on file    Smokeless tobacco: Not on file   Substance and Sexual Activity    Alcohol use: Not on file    Drug use: Not on file    Sexual activity: Not on file         Review of Systems   Constitutional: Negative.    HENT: Negative.     Eyes: Negative.    Respiratory:  Positive for cough and wheezing.    Cardiovascular:  Negative for chest pain.   Gastrointestinal: Negative.    Endocrine: Negative.    Genitourinary: Negative.    Musculoskeletal: Negative.    Skin: Negative.    Allergic/Immunologic: Negative.    Neurological: Negative.    Hematological: Negative.    Psychiatric/Behavioral: Negative.       Objective:     Vital Signs (Most Recent):  Temp: 98.5 °F (36.9 °C) (08/28/23 1932)  Pulse: 81 (08/28/23 1932)  Resp: (!) 23 (08/28/23 1932)  BP: 124/79 (08/28/23 1932)  SpO2: (!) 93 % (08/28/23 1932) Vital Signs (24h Range):  Temp:  [98.5 °F (36.9 °C)] 98.5 °F (36.9 °C)  Pulse:  [76-82] 81  Resp:  [19-23] 23  SpO2:  [93 %-97 %] 93 %  BP: (124-129)/(79) 124/79     Weight: 87.5 kg (192 lb 14.4 oz)  Body mass index is 27.68 kg/m².      Intake/Output Summary (Last 24 hours) at 8/28/2023 2037  Last data filed at 8/28/2023 1910  Gross per 24 hour   Intake --   Output 300 ml   Net -300 ml        Physical Exam  Vitals reviewed.   Constitutional:       General: He is not in acute distress.     Appearance: He is not diaphoretic.   HENT:      Head: Normocephalic and atraumatic.      Nose: Nose normal.      Mouth/Throat:      Mouth: Mucous membranes are moist.      Pharynx: Oropharynx is clear.   Eyes:       Extraocular Movements: Extraocular movements intact.      Pupils: Pupils are equal, round, and reactive to light.   Cardiovascular:      Rate and Rhythm: Normal rate and regular rhythm.      Pulses: Normal pulses.      Heart sounds: Normal heart sounds.   Pulmonary:      Effort: Pulmonary effort is normal. No respiratory distress.      Breath sounds: Wheezing present.   Abdominal:      General: Bowel sounds are normal. There is no distension.      Palpations: Abdomen is soft.      Tenderness: There is no abdominal tenderness.   Musculoskeletal:         General: Normal range of motion.      Cervical back: Normal range of motion and neck supple.   Skin:     General: Skin is warm and dry.      Capillary Refill: Capillary refill takes less than 2 seconds.   Neurological:      General: No focal deficit present.      Mental Status: He is alert and oriented to person, place, and time.   Psychiatric:         Mood and Affect: Mood normal.         Behavior: Behavior normal.          Vents:       Lines/Drains/Airways       Peripheral Intravenous Line  Duration                  Peripheral IV - Single Lumen 08/28/23 1750 20 G Anterior;Right Forearm <1 day         Peripheral IV - Single Lumen 08/28/23 1754 18 G Anterior;Distal;Left Upper Arm <1 day                    Significant Labs:    CBC/Anemia Profile:  Recent Labs   Lab 08/28/23  1759   WBC 16.41*   HGB 17.2   HCT 50.3      MCV 94   RDW 13.4        Chemistries:  Recent Labs   Lab 08/28/23  1845      K 3.7      CO2 22*   BUN 11   CREATININE 1.0   CALCIUM 7.8*   ALBUMIN 3.5   PROT 6.3   BILITOT 0.3   ALKPHOS 64   ALT 20   AST 13       Troponin:   Recent Labs   Lab 08/28/23  1845   TROPONINI 0.031*     All pertinent labs within the past 24 hours have been reviewed.    Significant Imaging:   I have reviewed all pertinent imaging results/findings within the past 24 hours.  CXR reviewed, no acute abnormality

## 2023-08-29 NOTE — PROGRESS NOTES
"[facility]  Critical Care Medicine Progress Note    Patient Name: Sawyer Hammond MRN: [id number]  Admission Date: 8/28/2023   Hospital Length of Stay: 1 days  Code Status: No Order    Attending Provider: Leola Dodson MD    Principal Problem: ST elevation myocardial infarction involving right coronary artery  Subjective:   History of Present Illness:  Sawyer Hammond is 48 year old male with no reported past medical history who presented with complaints of right sided chest pain which radiated up his left jaw and left arm. He reported this as the "worst pain of my life". Patient reports he had intermittent chest pain which was similar before this incident but had never been this severe or lasted as long. Upon his presentation,  EKG showed acute inferolateral STEMI. He was brought emergently to cath lab for PCI per Dr. Dodson. He was found to have occluded distal RCA which was treated with thrombectomy and stenting. Reports a history of at least a 2-pack per day smoking history. Critical care team was consulted post procedure on arrival to ICU.    Interval History:    Denies acute chest pain complaints this morning or since his time of admission   Hemodynamics stable   Some wheezing noted on exam   Smoking cessation was discussed; however, patient reports he will not likely stop smoking upon discharge   Medication compliance was discussed as well; however, patient reports he will "try my best to take my meds"    Objective:     Vital Signs (Most Recent):  Temp: 98.8 °F (37.1 °C) (08/29/23 0705)  Pulse: 82 (08/29/23 0811)  Resp: 19 (08/29/23 0811)  BP: 134/79 (08/29/23 0805)  SpO2: (!) 94 % (08/29/23 0811) Vital Signs (24h Range):  Temp:  [98.5 °F (36.9 °C)-98.8 °F (37.1 °C)] 98.8 °F (37.1 °C)  Pulse:  [72-90] 82  Resp:  [16-50] 19  SpO2:  [82 %-97 %] 94 %  BP: (124-147)/(71-93) 134/79   Weight: 87.1 kg (192 lb); Body mass index is 27.55 kg/m².    Intake/Output Summary " (Last 24 hours) at 8/29/2023 0814  Last data filed at 8/29/2023 0811  Gross per 24 hour   Intake 1399.27 ml   Output 1625 ml   Net -225.73 ml      Physical Exam  Vitals and nursing note reviewed.   HENT:      Head: Normocephalic.   Eyes:      Conjunctiva/sclera: Conjunctivae normal.   Cardiovascular:      Rate and Rhythm: Normal rate.   Pulmonary:      Effort: Pulmonary effort is normal.      Breath sounds: Wheezing present.   Abdominal:      Palpations: Abdomen is soft.   Musculoskeletal:         General: Normal range of motion.      Cervical back: Normal range of motion and neck supple.   Skin:     General: Skin is warm and dry.   Neurological:      Mental Status: He is alert and oriented to person, place, and time.   Psychiatric:         Mood and Affect: Mood normal.       Review of Systems: Negative except as indicated in HPI    Significant Labs:  CBC/Anemia Profile:  Recent Labs   Lab 08/28/23  1759 08/29/23  0344   WBC 16.41* 13.75*   HGB 17.2 16.4   HCT 50.3 48.9    210   MCV 94 94   RDW 13.4 13.4   Chemistries:  Recent Labs   Lab 08/28/23  1845 08/29/23  0344    139   K 3.7 3.9    108   CO2 22* 21*   BUN 11 10   CREATININE 1.0 0.9   CALCIUM 7.8* 8.7   ALBUMIN 3.5  --    PROT 6.3  --    BILITOT 0.3  --    ALKPHOS 64  --    ALT 20  --    AST 13  --      Assessment/Plan:   Coronary artery disease involving native coronary artery of native heart with unstable angina pectoris  ST elevation myocardial infarction involving right coronary artery  Presented with acute left sided chest pain radiating to left jaw and arm s/p LHC found to have occluded distal RCA     · Post thrombectomy and stenting  · Continue cardiac monitoring for evidence of acute ischemia  · Monitoring for further complaints of chest pain; denies any chest pain since admission  · Continue ASA and Ticagrelor  · Continue Metoprolol 25mg po BID  · Monitor hemodynamics and adjust meds as appropriate    Tobacco dependence  Assistance  with smoking cessation was offered, including:  [x]  Medications  [x]  Counseling  [x]  Printed Information on Smoking Cessation  [x]  Referral to a Smoking Cessation Program; refuses referral    Patient was counseled regarding smoking for 3-10 minutes.     Wheezing  No formal COPD diagnosis; reports using a friends albuterol     · Endorses at least a 2-pack per day smoking history  · Would benefit from pulmonary evaluation upon discharge  · Continue scheduled josué Schwartz NP  Pulmonary and Critical Care  [facility]

## 2023-08-29 NOTE — HPI
"Sawyer Hammond is 48 year old male with no reported past medical history who presented with complaints of right sided chest pain which radiated up his left jaw and left arm. He reported this as the "worst pain of my life". Patient reports he had intermittent chest pain which was similar before this incident but had never been this severe or lasted as long. Upon his presentation,  EKG showed acute inferolateral STEMI. He was brought emergently to cath lab for PCI per Dr. Dodson. He was found to have occluded distal RCA which was treated with thrombectomy and stenting. Reports a history of at least a 2-pack per day smoking history. Critical care team was consulted post procedure on arrival to ICU.    "

## 2023-08-29 NOTE — ASSESSMENT & PLAN NOTE
Presented with acute left sided chest pain radiating to left jaw and arm s/p LHC found to have occluded distal RCA     · Post thrombectomy and stenting  · Continue cardiac monitoring for evidence of acute ischemia  · Monitoring for further complaints of chest pain; denies any chest pain since admission  · Continue ASA and Ticagrelor  · Continue Metoprolol 25mg po BID  · Monitor hemodynamics and adjust meds as appropriate

## 2023-08-29 NOTE — PLAN OF CARE
[facility] - Intensive Care (Hospital)  Initial Discharge Assessment       Primary Care Provider: No primary care provider on file.    Admission Diagnosis: STEMI (ST elevation myocardial infarction) [I21.3]  Chest pain [R07.9]  ST elevation myocardial infarction (STEMI), unspecified artery [I21.3]    Admission Date: 8/28/2023  Expected Discharge Date:     Transition of Care Barriers: Unisured    Payor: MEDICAID / Plan: PENDING MEDICAID / Product Type: Government /     No emergency contact information on file.            No Pharmacies Listed    Initial Assessment (most recent)       Adult Discharge Assessment - 08/29/23 1230          Discharge Assessment    Assessment Type Discharge Planning Assessment     Confirmed/corrected address, phone number and insurance Yes     Confirmed Demographics Correct on Facesheet     Source of Information patient     Communicated PINKY with patient/caregiver Date not available/Unable to determine     Reason For Admission STEMI     People in Home alone     Facility Arrived From: home     Do you expect to return to your current living situation? Yes     Do you have help at home or someone to help you manage your care at home? Yes     Who are your caregiver(s) and their phone number(s)? BrotherDonovan     Prior to hospitilization cognitive status: Alert/Oriented     Current cognitive status: Alert/Oriented     Home Accessibility stairs to enter home     Number of Stairs, Main Entrance three     Home Layout Able to live on 1st floor     Equipment Currently Used at Home none     Readmission within 30 days? No     Patient currently being followed by outpatient case management? No     Do you currently have service(s) that help you manage your care at home? No     Do you take prescription medications? No     Do you have prescription coverage? No     Who is going to help you get home at discharge? self or brother     How do you get to doctors appointments? car, drives self     Are you on dialysis?  No     Do you take coumadin? No     DME Needed Upon Discharge  none     Discharge Plan discussed with: Patient     Transition of Care Barriers Unisured                   Anticipated DC dispo: home with family    Prior Level of Function: independent with ADLs   People in home: lives alone     Comments:  CM met with patient at bedside to introduce role and discuss discharge planning. Brother will be help at home, if needed, and can provide transport at time of discharge. Patient does not use any DME at home. [facility] screened for medicaid. Confirmed demographics, insurance, and emergency contacts. CM discharge needs depends on hospital progress. CM will continue following to assist with other needs.

## 2023-08-29 NOTE — OP NOTE
INPATIENT Operative Note         SUMMARY     Surgery Date: 8/28/2023     Surgeon(s) and Role:     * Leola Dodson MD - Primary    ASSISTANT:none    Pre-op Diagnosis:  ST elevation myocardial infarction (STEMI), unspecified artery [I21.3]      Post-op Diagnosis:  ST elevation myocardial infarction (STEMI), unspecified artery [I21.3]    Procedure(s) (LRB):  Left heart cath (Left)  Percutaneous coronary intervention (N/A)  Thrombectomy, Coronary  Stent, Drug Eluting, Single Vessel, Coronary    COMPLICATION:none    Anesthesia: RN IV Sedation    Findings/Key Components:  Occluded distal rca treated with thrombectomy and stenting   Non obs left system  Lvf 45-50% inferior akinesis  Lvedp 17     Estimated Blood Loss: < 50 ML.         SPECIMEN: NONE    Devices/Prostetics: synergy xd x1     PLAN: routine post stent care

## 2023-08-29 NOTE — ASSESSMENT & PLAN NOTE
No formal COPD diagnosis, however patient reports that he has been using albuterol nebulizer treatments at home, that he obtained from friends who gave it to him  He states that he smokes at least one pack of cigarettes per day as well as marijuana  Nebulizer treatments ordered  CXR is clear  Should get PFT's as outpatient

## 2023-08-29 NOTE — CONSULTS
"[facility] - Intensive Care (Hospital)  Critical Care Medicine  Consult Note    Patient Name: Sawyer Hammond  MRN: [id number]  Admission Date: 8/28/2023  Hospital Length of Stay: 0 days  Code Status: No Order  Attending Physician: Leola Dodson MD   Primary Care Provider: No primary care provider on file.   Principal Problem: ST elevation myocardial infarction involving right coronary artery      Subjective:     HPI:  Patient is 48 year old male with no reported past medical history who presented with chest pain. He states the "worst pain of my life." Chest pain radiated to jaw and left arm. EKG showed acute inferolateral STEMI. He was brought emergently to cath lab for PCI per Dr. Dodson. He was found to have occluded distal RCA which was treated with thrombectomy and stenting. Critical care team is consulted post procedure on arrival to ICU.        Hospital/ICU Course:  No notes on file    Past Medical History:   Diagnosis Date    Coronary artery disease involving native coronary artery of native heart with unstable angina pectoris 8/28/2023    ST elevation myocardial infarction involving right coronary artery 8/28/2023    Tobacco use 8/28/2023       No past surgical history on file.    Review of patient's allergies indicates:  No Known Allergies    Family History    None       Tobacco Use    Smoking status: Not on file    Smokeless tobacco: Not on file   Substance and Sexual Activity    Alcohol use: Not on file    Drug use: Not on file    Sexual activity: Not on file         Review of Systems   Constitutional: Negative.    HENT: Negative.     Eyes: Negative.    Respiratory:  Positive for cough and wheezing.    Cardiovascular:  Negative for chest pain.   Gastrointestinal: Negative.    Endocrine: Negative.    Genitourinary: Negative.    Musculoskeletal: Negative.    Skin: Negative.    Allergic/Immunologic: Negative.    Neurological: Negative.    Hematological: Negative.    Psychiatric/Behavioral: Negative. "       Objective:     Vital Signs (Most Recent):  Temp: 98.5 °F (36.9 °C) (08/28/23 1932)  Pulse: 81 (08/28/23 1932)  Resp: (!) 23 (08/28/23 1932)  BP: 124/79 (08/28/23 1932)  SpO2: (!) 93 % (08/28/23 1932) Vital Signs (24h Range):  Temp:  [98.5 °F (36.9 °C)] 98.5 °F (36.9 °C)  Pulse:  [76-82] 81  Resp:  [19-23] 23  SpO2:  [93 %-97 %] 93 %  BP: (124-129)/(79) 124/79     Weight: 87.5 kg (192 lb 14.4 oz)  Body mass index is 27.68 kg/m².      Intake/Output Summary (Last 24 hours) at 8/28/2023 2037  Last data filed at 8/28/2023 1910  Gross per 24 hour   Intake --   Output 300 ml   Net -300 ml        Physical Exam  Vitals reviewed.   Constitutional:       General: He is not in acute distress.     Appearance: He is not diaphoretic.   HENT:      Head: Normocephalic and atraumatic.      Nose: Nose normal.      Mouth/Throat:      Mouth: Mucous membranes are moist.      Pharynx: Oropharynx is clear.   Eyes:      Extraocular Movements: Extraocular movements intact.      Pupils: Pupils are equal, round, and reactive to light.   Cardiovascular:      Rate and Rhythm: Normal rate and regular rhythm.      Pulses: Normal pulses.      Heart sounds: Normal heart sounds.   Pulmonary:      Effort: Pulmonary effort is normal. No respiratory distress.      Breath sounds: Wheezing present.   Abdominal:      General: Bowel sounds are normal. There is no distension.      Palpations: Abdomen is soft.      Tenderness: There is no abdominal tenderness.   Musculoskeletal:         General: Normal range of motion.      Cervical back: Normal range of motion and neck supple.   Skin:     General: Skin is warm and dry.      Capillary Refill: Capillary refill takes less than 2 seconds.   Neurological:      General: No focal deficit present.      Mental Status: He is alert and oriented to person, place, and time.   Psychiatric:         Mood and Affect: Mood normal.         Behavior: Behavior normal.          Vents:       Lines/Drains/Airways        Peripheral Intravenous Line  Duration                  Peripheral IV - Single Lumen 08/28/23 1750 20 G Anterior;Right Forearm <1 day         Peripheral IV - Single Lumen 08/28/23 1754 18 G Anterior;Distal;Left Upper Arm <1 day                    Significant Labs:    CBC/Anemia Profile:  Recent Labs   Lab 08/28/23  1759   WBC 16.41*   HGB 17.2   HCT 50.3      MCV 94   RDW 13.4        Chemistries:  Recent Labs   Lab 08/28/23  1845      K 3.7      CO2 22*   BUN 11   CREATININE 1.0   CALCIUM 7.8*   ALBUMIN 3.5   PROT 6.3   BILITOT 0.3   ALKPHOS 64   ALT 20   AST 13       Troponin:   Recent Labs   Lab 08/28/23  1845   TROPONINI 0.031*     All pertinent labs within the past 24 hours have been reviewed.    Significant Imaging:   I have reviewed all pertinent imaging results/findings within the past 24 hours.  CXR reviewed, no acute abnormality    Assessment/Plan:     Pulmonary  Wheezing  No formal COPD diagnosis, however patient reports that he has been using albuterol nebulizer treatments at home, that he obtained from friends who gave it to him  He states that he smokes at least one pack of cigarettes per day as well as marijuana  Nebulizer treatments ordered  CXR is clear  Should get PFT's as outpatient    Cardiac/Vascular  * ST elevation myocardial infarction involving right coronary artery  Patient admitted to ICU per Dr. Dodson s/p [facility] , found to have occluded distal RCA which was treated with thrombectomy and stenting  Bedrest  Monitor site  No chest pain post-procedure  Cardiac monitoring    Coronary artery disease involving native coronary artery of native heart with unstable angina pectoris  ASA, beta blocker, statin, ticagrelor     Other  Tobacco use   on smoking cessation       Critical Care Time: 40 minutes  Critical secondary to STEMI     Critical care was time spent personally by me on the following activities: development of treatment plan with patient or surrogate and bedside  caregivers, discussions with consultants, evaluation of patient's response to treatment, examination of patient, ordering and performing treatments and interventions, ordering and review of laboratory studies, ordering and review of radiographic studies, pulse oximetry, re-evaluation of patient's condition. This critical care time did not overlap with that of any other provider or involve time for any procedures.    Thank you for your consult. I will follow-up with patient. Please contact us if you have any additional questions.     Olga Rasmussen NP  Critical Care Medicine  [facility] - Intensive Care ([facility])

## 2023-08-29 NOTE — PROGRESS NOTES
[facility] - Intensive Care ([facility])  Cardiology  Progress Note    Patient Name: Sawyer Hammond  MRN: [id number]  Admission Date: 8/28/2023  Hospital Length of Stay: 1 days  Code Status: No Order   Attending Physician: Leola Dodson MD   Primary Care Physician: No primary care provider on file.  Expected Discharge Date:   Principal Problem:ST elevation myocardial infarction involving right coronary artery    Subjective:   HPI:  Patient is a 48 y.o. male presents with via ems with chest pain left arm jaw pain of 2 hours duration he was brought via ems he has acute inferolateral stemi by ekg. He was rushed to cath lab for emergency pci    Hospital Course:   8/29/23-Patient seen and examined today, s/p C yesterday with PCI of RCA. Feels ok, didn't sleep well overnight. No recurrent CP symptoms. No SOB. Labs stable. Trending troponin. Echo pending.          Review of Systems   Constitutional: Positive for malaise/fatigue.   HENT: Negative.     Eyes: Negative.    Cardiovascular: Negative.    Respiratory: Negative.     Endocrine: Negative.    Hematologic/Lymphatic: Negative.    Skin: Negative.    Musculoskeletal: Negative.    Gastrointestinal: Negative.    Genitourinary: Negative.    Neurological: Negative.    Psychiatric/Behavioral: Negative.     Allergic/Immunologic: Negative.      Objective:     Vital Signs (Most Recent):  Temp: 98.7 °F (37.1 °C) (08/29/23 1105)  Pulse: 76 (08/29/23 1153)  Resp: 19 (08/29/23 1153)  BP: 116/88 (08/29/23 1105)  SpO2: (!) 93 % (08/29/23 1153) Vital Signs (24h Range):  Temp:  [98.5 °F (36.9 °C)-98.8 °F (37.1 °C)] 98.7 °F (37.1 °C)  Pulse:  [72-90] 76  Resp:  [16-50] 19  SpO2:  [82 %-97 %] 93 %  BP: (116-147)/(68-93) 116/88     Weight: 87.1 kg (192 lb)  Body mass index is 27.55 kg/m².     SpO2: (!) 93 %         Intake/Output Summary (Last 24 hours) at 8/29/2023 1228  Last data filed at 8/29/2023 1152  Gross per 24 hour   Intake 1399.27 ml   Output 1975 ml   Net -575.73 ml        Lines/Drains/Airways       Peripheral Intravenous Line  Duration                  Peripheral IV - Single Lumen 08/29/23 0705 20 G Anterior;Left Forearm <1 day                       Physical Exam  Vitals and nursing note reviewed.   Constitutional:       General: He is not in acute distress.     Appearance: Normal appearance. He is well-developed. He is obese. He is not diaphoretic.   HENT:      Head: Normocephalic and atraumatic.   Eyes:      General:         Right eye: No discharge.         Left eye: No discharge.      Pupils: Pupils are equal, round, and reactive to light.   Neck:      Thyroid: No thyromegaly.      Vascular: No JVD.      Trachea: No tracheal deviation.   Cardiovascular:      Rate and Rhythm: Normal rate and regular rhythm.      Heart sounds: Normal heart sounds, S1 normal and S2 normal. No murmur heard.  Pulmonary:      Effort: Pulmonary effort is normal. No respiratory distress.      Breath sounds: Normal breath sounds. No wheezing or rales.   Abdominal:      General: There is no distension.      Palpations: Abdomen is soft.      Tenderness: There is no rebound.   Musculoskeletal:      Cervical back: Neck supple.   Skin:     General: Skin is warm and dry.      Findings: No erythema.      Comments: Groin access site C/D/I; no bleeding erythema drainage or hematoma   Neurological:      General: No focal deficit present.      Mental Status: He is alert and oriented to person, place, and time.   Psychiatric:         Mood and Affect: Mood normal.         Behavior: Behavior normal.         Thought Content: Thought content normal.            Significant Labs: CMP   Recent Labs   Lab 08/28/23  1845 08/29/23  0344    139   K 3.7 3.9    108   CO2 22* 21*   * 97   BUN 11 10   CREATININE 1.0 0.9   CALCIUM 7.8* 8.7   PROT 6.3  --    ALBUMIN 3.5  --    BILITOT 0.3  --    ALKPHOS 64  --    AST 13  --    ALT 20  --    ANIONGAP 10 10   , CBC   Recent Labs   Lab 08/28/23  2109  08/29/23  0344   WBC 16.41* 13.75*   HGB 17.2 16.4   HCT 50.3 48.9    210   , Troponin   Recent Labs   Lab 08/28/23  1845 08/29/23  0828   TROPONINI 0.031* 5.862*   , and All pertinent lab results from the last 24 hours have been reviewed.    Significant Imaging: Echocardiogram: Transthoracic echo (TTE) complete (Cupid Only):   Results for orders placed or performed during the hospital encounter of 08/28/23   Echo   Result Value Ref Range    BSA 2.07 m2    LVOT stroke volume 87.83 cm3    LVIDd 4.98 3.5 - 6.0 cm    LV Systolic Volume 57.20 mL    LV Systolic Volume Index 27.9 mL/m2    LVIDs 3.68 2.1 - 4.0 cm    LV Diastolic Volume 116.98 mL    LV Diastolic Volume Index 57.06 mL/m2    IVS 1.13 (A) 0.6 - 1.1 cm    LVOT diameter 2.23 cm    LVOT area 3.9 cm2    FS 26 (A) 28 - 44 %    Left Ventricle Relative Wall Thickness 0.41 cm    Posterior Wall 1.03 0.6 - 1.1 cm    LV mass 200.69 g    LV Mass Index 98 g/m2    MV Peak E Shaquille 0.90 m/s    TDI LATERAL 0.12 m/s    TDI SEPTAL 0.11 m/s    E/E' ratio 7.83 m/s    MV Peak A Shaquille 0.69 m/s    E/A ratio 1.30     IVRT 91.34 msec    E wave deceleration time 319.97 msec    LV SEPTAL E/E' RATIO 8.18 m/s    LV LATERAL E/E' RATIO 7.50 m/s    LVOT peak shaquille 0.92 m/s    Left Ventricular Outflow Tract Mean Velocity 0.77 cm/s    Left Ventricular Outflow Tract Mean Gradient 2.49 mmHg    LA size 3.39 cm    Left Atrium Major Axis 3.76 cm    Left Atrium Minor Axis 3.11 cm    RVOT peak VTI 15.3 cm    RA Major Axis 3.45 cm    AV mean gradient 4 mmHg    AV peak gradient 5 mmHg    Ao peak shaquille 1.15 m/s    Ao VTI 26.90 cm    LVOT peak VTI 22.50 cm    AV valve area 3.27 cm²    AV Velocity Ratio 0.80     AV index (prosthetic) 0.84     SUSY by Velocity Ratio 3.12 cm²    MV stenosis pressure 1/2 time 92.79 ms    MV valve area p 1/2 method 2.37 cm2    PV mean gradient 1 mmHg    RVOT peak shaquille 0.63 m/s    Ao root annulus 2.86 cm    STJ 3.33 cm    Ascending aorta 3.14 cm    IVC diameter 1.74 cm    Mean e'  0.12 m/s    ZLVIDS -0.18     ZLVIDD -2.11     LA Volume Index 11.0 mL/m2    LA volume 22.56 cm3    LA WIDTH 2.3 cm    TAPSE 2.50 cm    RA Width 2.6 cm    Est. RA pres 3 mmHg    Narrative      Left Ventricle: The left ventricle is normal in size. Normal wall   thickness. regional wall motion abnormalities present. There is mildly   reduced systolic function with a visually estimated ejection fraction of   45 - 50%. There is normal diastolic function.    Right Ventricle: Normal right ventricular cavity size. Wall thickness   is normal. Right ventricle wall motion  is normal. Systolic function is   normal.    Aortic Valve: The aortic valve is a trileaflet valve. There is no   stenosis. Aortic valve peak velocity is 1.15 m/s. Mean gradient is 4 mmHg.   There is no significant regurgitation.    Mitral Valve: There is no stenosis. There is no significant   regurgitation.    Tricuspid Valve: There is no significant regurgitation.    IVC/SVC: Normal venous pressure at 3 mmHg.     , EKG: Reviewed, and X-Ray: CXR: X-Ray Chest 1 View (CXR): No results found for this visit on 08/28/23. and X-Ray Chest PA and Lateral (CXR): No results found for this visit on 08/28/23.    Assessment and Plan:   Patient who presents with acute STEMI s/p successful PCI of RCA. Stable this AM. Trend troponin/check echo. Continue OMT. Transfer to telemetry.    * ST elevation myocardial infarction involving right coronary artery  -s/p C with successful PCI  -Stable this AM, CP free  -Continue ASA, BB, statin, Brilinta  -Echo pending    BMI 27.0-27.9,adult  -Weight loss    Wheezing  -Appreciate pulmonary assistance    Tobacco dependence  -Smoking cessation advised      Coronary artery disease involving native coronary artery of native heart with unstable angina pectoris  -Stable post PCI  -Continue OMT        VTE Risk Mitigation (From admission, onward)         Ordered     enoxaparin injection 40 mg  Every 24 hours         08/29/23 0946                 Erika Russ PA-C  Cardiology  [facility] - Intensive Care ([facility])

## 2023-08-29 NOTE — ASSESSMENT & PLAN NOTE
Assistance with smoking cessation was offered, including:  [x]  Medications  [x]  Counseling  [x]  Printed Information on Smoking Cessation  [x]  Referral to a Smoking Cessation Program; refuses referral    Patient was counseled regarding smoking for 3-10 minutes.

## 2023-08-29 NOTE — PLAN OF CARE
Pt AAOx3. NSR on monitor. BP stable. Afebrile. Room air. Tolerating diet. Voids per urinal. Puncture site CDI. Pt turns independently. Bed low, wheels locked, alarms audible. POC reviewed.

## 2023-08-29 NOTE — EICU
EICU BRIEF ADMIT NOTE:    HISTORY: Please refer to H/P and ER notes for detail. Status post angiography, pain-free at this time.    CAMERA ASSESSMENT: Two way audiovisual assessment was done.     Telemetry was reviewed. Medical records including notes, labs and imaging were reviewed.    DISCUSSED with bedside nurse.    ASSESSMENT AND PLAN:    ST elevation MI status post angiography with angioplasty and stent placement.  We will continue current management as per cardiology recommendation.    Thank You for allowing EICU to participate in the care of the patient. Please call as needed      Santana Kan MD  [facility]  [phone]

## 2023-08-29 NOTE — HOSPITAL COURSE
8/29/23-Patient seen and examined today, s/p C yesterday with PCI of RCA. Feels ok, didn't sleep well overnight. No recurrent CP symptoms. No SOB. Labs stable. Trending troponin. Echo pending.

## 2023-08-29 NOTE — ASSESSMENT & PLAN NOTE
-s/p [facility] with successful PCI  -Stable this AM, CP free  -Continue ASA, BB, statin, Brilinta  -Echo pending

## 2023-08-30 ENCOUNTER — TELEPHONE (OUTPATIENT)
Dept: CARDIOLOGY | Facility: HOSPITAL | Age: 48
End: 2023-08-30
Payer: MEDICAID

## 2023-08-30 VITALS
DIASTOLIC BLOOD PRESSURE: 73 MMHG | SYSTOLIC BLOOD PRESSURE: 123 MMHG | BODY MASS INDEX: 27.49 KG/M2 | HEIGHT: 70 IN | HEART RATE: 68 BPM | OXYGEN SATURATION: 100 % | RESPIRATION RATE: 30 BRPM | TEMPERATURE: 99 F | WEIGHT: 192 LBS

## 2023-08-30 PROBLEM — Z95.5 S/P CORONARY ARTERY STENT PLACEMENT: Status: ACTIVE | Noted: 2023-08-30

## 2023-08-30 LAB
ANION GAP SERPL CALC-SCNC: 12 MMOL/L (ref 8–16)
BASOPHILS # BLD AUTO: 0.06 K/UL (ref 0–0.2)
BASOPHILS NFR BLD: 0.5 % (ref 0–1.9)
BUN SERPL-MCNC: 14 MG/DL (ref 6–20)
CALCIUM SERPL-MCNC: 9.7 MG/DL (ref 8.7–10.5)
CHLORIDE SERPL-SCNC: 101 MMOL/L (ref 95–110)
CO2 SERPL-SCNC: 25 MMOL/L (ref 23–29)
CREAT SERPL-MCNC: 1.1 MG/DL (ref 0.5–1.4)
DIFFERENTIAL METHOD: ABNORMAL
EOSINOPHIL # BLD AUTO: 0.3 K/UL (ref 0–0.5)
EOSINOPHIL NFR BLD: 2.6 % (ref 0–8)
ERYTHROCYTE [DISTWIDTH] IN BLOOD BY AUTOMATED COUNT: 13.4 % (ref 11.5–14.5)
EST. GFR  (NO RACE VARIABLE): >60 ML/MIN/1.73 M^2
GLUCOSE SERPL-MCNC: 127 MG/DL (ref 70–110)
HCT VFR BLD AUTO: 52.7 % (ref 40–54)
HGB BLD-MCNC: 17.3 G/DL (ref 14–18)
IMM GRANULOCYTES # BLD AUTO: 0.1 K/UL (ref 0–0.04)
IMM GRANULOCYTES NFR BLD AUTO: 0.8 % (ref 0–0.5)
LYMPHOCYTES # BLD AUTO: 3.7 K/UL (ref 1–4.8)
LYMPHOCYTES NFR BLD: 28.4 % (ref 18–48)
MCH RBC QN AUTO: 30.8 PG (ref 27–31)
MCHC RBC AUTO-ENTMCNC: 32.8 G/DL (ref 32–36)
MCV RBC AUTO: 94 FL (ref 82–98)
MONOCYTES # BLD AUTO: 1.5 K/UL (ref 0.3–1)
MONOCYTES NFR BLD: 11.2 % (ref 4–15)
NEUTROPHILS # BLD AUTO: 7.5 K/UL (ref 1.8–7.7)
NEUTROPHILS NFR BLD: 56.5 % (ref 38–73)
NRBC BLD-RTO: 0 /100 WBC
PLATELET # BLD AUTO: 222 K/UL (ref 150–450)
PMV BLD AUTO: 9.6 FL (ref 9.2–12.9)
POTASSIUM SERPL-SCNC: 4 MMOL/L (ref 3.5–5.1)
RBC # BLD AUTO: 5.61 M/UL (ref 4.6–6.2)
SODIUM SERPL-SCNC: 138 MMOL/L (ref 136–145)
TROPONIN I SERPL DL<=0.01 NG/ML-MCNC: 2.12 NG/ML (ref 0–0.03)
TROPONIN I SERPL DL<=0.01 NG/ML-MCNC: 2.65 NG/ML (ref 0–0.03)
WBC # BLD AUTO: 13.18 K/UL (ref 3.9–12.7)

## 2023-08-30 PROCEDURE — 25000003 PHARM REV CODE 250: Performed by: STUDENT IN AN ORGANIZED HEALTH CARE EDUCATION/TRAINING PROGRAM

## 2023-08-30 PROCEDURE — 84484 ASSAY OF TROPONIN QUANT: CPT | Mod: 91 | Performed by: PHYSICIAN ASSISTANT

## 2023-08-30 PROCEDURE — S4991 NICOTINE PATCH NONLEGEND: HCPCS | Performed by: PHYSICIAN ASSISTANT

## 2023-08-30 PROCEDURE — 36415 COLL VENOUS BLD VENIPUNCTURE: CPT | Performed by: PHYSICIAN ASSISTANT

## 2023-08-30 PROCEDURE — 25000242 PHARM REV CODE 250 ALT 637 W/ HCPCS: Performed by: PHYSICIAN ASSISTANT

## 2023-08-30 PROCEDURE — 99238 PR HOSPITAL DISCHARGE DAY,<30 MIN: ICD-10-PCS | Mod: ,,, | Performed by: PHYSICIAN ASSISTANT

## 2023-08-30 PROCEDURE — 94761 N-INVAS EAR/PLS OXIMETRY MLT: CPT

## 2023-08-30 PROCEDURE — 99238 HOSP IP/OBS DSCHRG MGMT 30/<: CPT | Mod: ,,, | Performed by: PHYSICIAN ASSISTANT

## 2023-08-30 PROCEDURE — 85025 COMPLETE CBC W/AUTO DIFF WBC: CPT | Performed by: PHYSICIAN ASSISTANT

## 2023-08-30 PROCEDURE — 25000003 PHARM REV CODE 250: Performed by: PHYSICIAN ASSISTANT

## 2023-08-30 PROCEDURE — 80048 BASIC METABOLIC PNL TOTAL CA: CPT | Performed by: PHYSICIAN ASSISTANT

## 2023-08-30 PROCEDURE — 94640 AIRWAY INHALATION TREATMENT: CPT

## 2023-08-30 RX ORDER — LOSARTAN POTASSIUM 25 MG/1
25 TABLET ORAL DAILY
Qty: 90 TABLET | Refills: 3 | Status: SHIPPED | OUTPATIENT
Start: 2023-08-31 | End: 2024-01-24 | Stop reason: SDUPTHER

## 2023-08-30 RX ORDER — ATORVASTATIN CALCIUM 80 MG/1
80 TABLET, FILM COATED ORAL DAILY
Qty: 90 TABLET | Refills: 3 | Status: SHIPPED | OUTPATIENT
Start: 2023-08-31 | End: 2024-08-30

## 2023-08-30 RX ORDER — ASPIRIN 81 MG/1
81 TABLET ORAL DAILY
Qty: 90 TABLET | Refills: 3 | Status: SHIPPED | OUTPATIENT
Start: 2023-08-31 | End: 2024-08-30

## 2023-08-30 RX ORDER — METOPROLOL TARTRATE 50 MG/1
50 TABLET ORAL 2 TIMES DAILY
Qty: 60 TABLET | Refills: 11 | Status: SHIPPED | OUTPATIENT
Start: 2023-08-30 | End: 2023-10-19 | Stop reason: SDUPTHER

## 2023-08-30 RX ADMIN — METOPROLOL TARTRATE 50 MG: 50 TABLET, FILM COATED ORAL at 09:08

## 2023-08-30 RX ADMIN — LOSARTAN POTASSIUM 25 MG: 25 TABLET, FILM COATED ORAL at 09:08

## 2023-08-30 RX ADMIN — ASPIRIN 81 MG: 81 TABLET, COATED ORAL at 09:08

## 2023-08-30 RX ADMIN — ATORVASTATIN CALCIUM 80 MG: 40 TABLET, FILM COATED ORAL at 09:08

## 2023-08-30 RX ADMIN — MUPIROCIN: 20 OINTMENT TOPICAL at 09:08

## 2023-08-30 RX ADMIN — ARFORMOTEROL TARTRATE 15 MCG: 15 SOLUTION RESPIRATORY (INHALATION) at 07:08

## 2023-08-30 RX ADMIN — TICAGRELOR 90 MG: 90 TABLET ORAL at 09:08

## 2023-08-30 RX ADMIN — NICOTINE 1 PATCH: 21 PATCH, EXTENDED RELEASE TRANSDERMAL at 09:08

## 2023-08-30 RX ADMIN — BUDESONIDE 0.5 MG: 0.5 INHALANT ORAL at 07:08

## 2023-08-30 NOTE — PLAN OF CARE
[facility] - Intensive Care (Hospital)  Discharge Final Note    Primary Care Provider: No, Primary Doctor    Expected Discharge Date: 8/30/2023    Final Discharge Note (most recent)       Final Note - 08/30/23 1549          Final Note    Assessment Type Final Discharge Note     Anticipated Discharge Disposition Home or Self Care        Post-Acute Status    Discharge Delays None known at this time                     Important Message from Medicare           Medicaid application submitted. Cards nurse visit scheduled. No other CM needs.

## 2023-08-30 NOTE — PLAN OF CARE
No significant changes this shift. Pt AAOx4. VSS. On room air. NSR on heart monitor. Puncture site to R SALMA yorkL. Bed low and locked. Call light within reach. Bed alarm on. Will continue to monitor.

## 2023-08-30 NOTE — DISCHARGE SUMMARY
[facility] - Intensive Care (Hospital)  Cardiology  Discharge Summary      Patient Name: Sawyer Hammond  MRN: [id number]  Admission Date: 8/28/2023  Hospital Length of Stay: 2 days  Discharge Date and Time:  08/30/2023 12:58 PM  Attending Physician: Leola Dodson MD    Discharging Provider: Erika Carpio PA-C  Primary Care Physician: Melva, Primary Doctor    HPI:   Patient is a 48 y.o. male presents with via ems with chest pain left arm jaw pain of 2 hours duration he was brought via ems he has acute inferolateral stemi by ekg. He was rushed to cath lab for emergency pci.       Procedure(s) (LRB):  Left heart cath (Left)  Percutaneous coronary intervention (N/A)  Thrombectomy, Coronary  Stent, Drug Eluting, Single Vessel, Coronary     Indwelling Lines/Drains at time of discharge:  Lines/Drains/Airways     None                 Hospital Course:  8/29/23-Patient seen and examined today, s/p [facility] yesterday with PCI of RCA. Feels ok, didn't sleep well overnight. No recurrent CP symptoms. No SOB. Labs stable. Trending troponin. Echo pending.    Patient seen and examined today and deemed suitable for discharge. No AEON. No CP/angina. Labs stable. BP controlled. Groin access site C/D/I; no hematoma, drainage, or erythema. Intact pulse. Patient will be discharged on ASA, Brilinta, BB, ARB, and statin. He was counseled extensively on post-cath restrictions and smoking cessation as well as compliance with DAPT therapy. Will arrange nurse visit next week. Referral sent to [facility] cardiology.      Goals of Care Treatment Preferences:         Consults:   Consults (From admission, onward)        Status Ordering Provider     Inpatient consult to Social Work  Once        Provider:  (Not yet assigned)    Completed ERIKA CARPIO     Inpatient consult to Critical Care Medicine  Once        Provider:  Garrison Higgins MD    Completed LEOLA DODSON     Inpatient consult to Social Work  Once        Provider:  (Not yet assigned)     Completed JOANNA GRADY          Significant Diagnostic Studies:   LHC, labs    Pending Diagnostic Studies:     None          Final Active Diagnoses:    Diagnosis Date Noted POA    PRINCIPAL PROBLEM:  ST elevation myocardial infarction involving right coronary artery [I21.11] 08/28/2023 Yes    S/P coronary artery stent placement [Z95.5] 08/30/2023 Not Applicable    BMI 27.0-27.9,adult [Z68.27] 08/29/2023 Not Applicable    Coronary artery disease involving native coronary artery of native heart with unstable angina pectoris [I25.110] 08/28/2023 Yes    Tobacco dependence [F17.200] 08/28/2023 Yes    Wheezing [R06.2] 08/28/2023 Yes      Problems Resolved During this Admission:     No new Assessment & Plan notes have been filed under this hospital service since the last note was generated.  Service: Cardiology      Discharged Condition: good    Disposition: Home or Self Care    Follow Up:    Patient Instructions:      Ambulatory referral/consult to Smoking Cessation Program   Standing Status: Future   Referral Priority: Routine Referral Type: Consultation   Referral Reason: Specialty Services Required   Requested Specialty: CTTS   Number of Visits Requested: 1     Diet Cardiac     Lifting restrictions     Other restrictions (specify):   Order Comments: No heavy bending or lifting next week   No strenuous activity over next week  Ok to shower, no baths x 5 days     Notify your health care provider if you experience any of the following:  temperature >100.4     Notify your health care provider if you experience any of the following:  persistent nausea and vomiting or diarrhea     Notify your health care provider if you experience any of the following:  severe uncontrolled pain     Notify your health care provider if you experience any of the following:  redness, tenderness, or signs of infection (pain, swelling, redness, odor or green/yellow discharge around incision site)     Notify your health care provider if  you experience any of the following:  difficulty breathing or increased cough     Notify your health care provider if you experience any of the following:  severe persistent headache     Notify your health care provider if you experience any of the following:  worsening rash     Notify your health care provider if you experience any of the following:  persistent dizziness, light-headedness, or visual disturbances     Notify your health care provider if you experience any of the following:  increased confusion or weakness     Notify your health care provider if you experience any of the following:     Remove dressing in 24 hours     Reason for not Prescribing Nicotine Replacement   Order Comments: Recent MI     Order Specific Question Answer Comments   Reason for not Prescribing: Not medically appropriate at this time      Activity as tolerated     Medications:  Reconciled Home Medications:      Medication List      START taking these medications    aspirin 81 MG EC tablet  Commonly known as: ECOTRIN  Take 1 tablet (81 mg total) by mouth once daily.  Start taking on: August 31, 2023     atorvastatin 80 MG tablet  Commonly known as: LIPITOR  Take 1 tablet (80 mg total) by mouth once daily.  Start taking on: August 31, 2023     losartan 25 MG tablet  Commonly known as: COZAAR  Take 1 tablet (25 mg total) by mouth once daily.  Start taking on: August 31, 2023     metoprolol tartrate 50 MG tablet  Commonly known as: LOPRESSOR  Take 1 tablet (50 mg total) by mouth 2 (two) times daily.     ticagrelor 90 mg tablet  Commonly known as: BRILINTA  Take 1 tablet (90 mg total) by mouth 2 (two) times daily.            Time spent on the discharge of patient: 25 minutes    Erika Russ PA-C  Cardiology  [facility] - [facility] ([facility])

## 2023-09-01 ENCOUNTER — PATIENT OUTREACH (OUTPATIENT)
Dept: ADMINISTRATIVE | Facility: CLINIC | Age: 48
End: 2023-09-01
Payer: MEDICAID

## 2023-09-01 NOTE — PROGRESS NOTES
C3 nurse spoke with Sawyer Hammond for a TCC post hospital discharge follow up call. The patient does not have a scheduled HOSFU appointment with No, Primary Doctor  within 5-7 days post hospital discharge date 08/30/2023. C3 nurse was unable to schedule HOSFU appointment in Epic.    Patient do not have a PCP and pending insurance, declined home NP visit.

## 2023-09-08 ENCOUNTER — TELEPHONE (OUTPATIENT)
Dept: CARDIOLOGY | Facility: CLINIC | Age: 48
End: 2023-09-08
Payer: MEDICAID

## 2023-09-08 NOTE — TELEPHONE ENCOUNTER
Pt needs release to go back to work heart cath on 8/28 - appt made        ----- Message from Radha Pete sent at 9/8/2023  9:31 AM CDT -----  Contact: Sawyer Montero is calling to speak with the nurse in regards to appt. Reports needing to reschedule the nurse visit that was on yesterday. Please give patient a callback at [phone].

## 2023-09-12 ENCOUNTER — TELEPHONE (OUTPATIENT)
Dept: CARDIOLOGY | Facility: CLINIC | Age: 48
End: 2023-09-12
Payer: MEDICAID

## 2023-09-12 ENCOUNTER — OFFICE VISIT (OUTPATIENT)
Dept: CARDIOLOGY | Facility: CLINIC | Age: 48
End: 2023-09-12
Payer: MEDICAID

## 2023-09-12 VITALS
HEART RATE: 65 BPM | HEIGHT: 70 IN | OXYGEN SATURATION: 94 % | WEIGHT: 190.25 LBS | BODY MASS INDEX: 27.24 KG/M2 | DIASTOLIC BLOOD PRESSURE: 64 MMHG | SYSTOLIC BLOOD PRESSURE: 100 MMHG

## 2023-09-12 DIAGNOSIS — I21.11 ST ELEVATION MYOCARDIAL INFARCTION INVOLVING RIGHT CORONARY ARTERY: ICD-10-CM

## 2023-09-12 DIAGNOSIS — Z95.5 S/P CORONARY ARTERY STENT PLACEMENT: Primary | ICD-10-CM

## 2023-09-12 DIAGNOSIS — I25.110 CORONARY ARTERY DISEASE INVOLVING NATIVE CORONARY ARTERY OF NATIVE HEART WITH UNSTABLE ANGINA PECTORIS: ICD-10-CM

## 2023-09-12 DIAGNOSIS — F17.200 TOBACCO DEPENDENCE: ICD-10-CM

## 2023-09-12 PROCEDURE — 4010F PR ACE/ARB THEARPY RXD/TAKEN: ICD-10-PCS | Mod: CPTII,,,

## 2023-09-12 PROCEDURE — 99999 PR PBB SHADOW E&M-EST. PATIENT-LVL III: CPT | Mod: PBBFAC,,,

## 2023-09-12 PROCEDURE — 1159F PR MEDICATION LIST DOCUMENTED IN MEDICAL RECORD: ICD-10-PCS | Mod: CPTII,,,

## 2023-09-12 PROCEDURE — 3078F DIAST BP <80 MM HG: CPT | Mod: CPTII,,,

## 2023-09-12 PROCEDURE — 4010F ACE/ARB THERAPY RXD/TAKEN: CPT | Mod: CPTII,,,

## 2023-09-12 PROCEDURE — 3078F PR MOST RECENT DIASTOLIC BLOOD PRESSURE < 80 MM HG: ICD-10-PCS | Mod: CPTII,,,

## 2023-09-12 PROCEDURE — 3008F PR BODY MASS INDEX (BMI) DOCUMENTED: ICD-10-PCS | Mod: CPTII,,,

## 2023-09-12 PROCEDURE — 99214 OFFICE O/P EST MOD 30 MIN: CPT | Mod: S$PBB,,,

## 2023-09-12 PROCEDURE — 1111F DSCHRG MED/CURRENT MED MERGE: CPT | Mod: CPTII,,,

## 2023-09-12 PROCEDURE — 99999 PR PBB SHADOW E&M-EST. PATIENT-LVL III: ICD-10-PCS | Mod: PBBFAC,,,

## 2023-09-12 PROCEDURE — 99213 OFFICE O/P EST LOW 20 MIN: CPT | Mod: PBBFAC

## 2023-09-12 PROCEDURE — 3074F SYST BP LT 130 MM HG: CPT | Mod: CPTII,,,

## 2023-09-12 PROCEDURE — 1160F PR REVIEW ALL MEDS BY PRESCRIBER/CLIN PHARMACIST DOCUMENTED: ICD-10-PCS | Mod: CPTII,,,

## 2023-09-12 PROCEDURE — 1160F RVW MEDS BY RX/DR IN RCRD: CPT | Mod: CPTII,,,

## 2023-09-12 PROCEDURE — 1159F MED LIST DOCD IN RCRD: CPT | Mod: CPTII,,,

## 2023-09-12 PROCEDURE — 99214 PR OFFICE/OUTPT VISIT, EST, LEVL IV, 30-39 MIN: ICD-10-PCS | Mod: S$PBB,,,

## 2023-09-12 PROCEDURE — 1111F PR DISCHARGE MEDS RECONCILED W/ CURRENT OUTPATIENT MED LIST: ICD-10-PCS | Mod: CPTII,,,

## 2023-09-12 PROCEDURE — 3008F BODY MASS INDEX DOCD: CPT | Mod: CPTII,,,

## 2023-09-12 PROCEDURE — 3074F PR MOST RECENT SYSTOLIC BLOOD PRESSURE < 130 MM HG: ICD-10-PCS | Mod: CPTII,,,

## 2023-09-12 NOTE — PROGRESS NOTES
Subjective:   Patient ID:  Sawyer Hammond is a 48 y.o. male who presents for evaluation of No chief complaint on file.      HPI 49y/o M with PMHX of CAD s/p STEMI with PCI RCA with Dr. Dodson, tobaccos abuse here today for HFU. Reports he is sometimes taking his Brilinta and sometimes his ASA. Smokes 1-2ppd of cigarettes. Pt returned to work the day after DC by his own decision. Denies any SOB, dizziness, near syncope. Does reports he trying to do light duty while at work but not having any CP.  He reports some GI side effects since home, diarrhea and upset stomach. Pt reports he is not drinking as much water as he should.     C 8/23'  Occluded distal rca treated with thrombectomy and stenting   Non obs left system  Lvf 45-50% inferior akinesis  Lvedp 17     Past Medical History:   Diagnosis Date    Coronary artery disease involving native coronary artery of native heart with unstable angina pectoris 8/28/2023    ST elevation myocardial infarction involving right coronary artery 8/28/2023    Tobacco use 8/28/2023       Past Surgical History:   Procedure Laterality Date    LEFT HEART CATHETERIZATION Left 8/28/2023    Procedure: Left heart cath;  Surgeon: Leola Dodson MD;  Location: [facility] CATH LAB;  Service: Cardiology;  Laterality: Left;    PERCUTANEOUS CORONARY INTERVENTION, ARTERY N/A 8/28/2023    Procedure: Percutaneous coronary intervention;  Surgeon: Leola Dodson MD;  Location: [facility] CATH LAB;  Service: Cardiology;  Laterality: N/A;    STENT, DRUG ELUTING, SINGLE VESSEL, CORONARY  8/28/2023    Procedure: Stent, Drug Eluting, Single Vessel, Coronary;  Surgeon: Leola Dodson MD;  Location: [facility] CATH LAB;  Service: Cardiology;;    THROMBECTOMY, CORONARY  8/28/2023    Procedure: Thrombectomy, Coronary;  Surgeon: Leola Dodson MD;  Location: [facility] CATH LAB;  Service: Cardiology;;  Using aspiration catheter            No family history on file.    Current Outpatient Medications on File Prior to Visit    Medication Sig Dispense Refill    atorvastatin (LIPITOR) 80 MG tablet Take 1 tablet (80 mg total) by mouth once daily. 90 tablet 3    losartan (COZAAR) 25 MG tablet Take 1 tablet (25 mg total) by mouth once daily. 90 tablet 3    metoprolol tartrate (LOPRESSOR) 50 MG tablet Take 1 tablet (50 mg total) by mouth 2 (two) times daily. 60 tablet 11    ticagrelor (BRILINTA) 90 mg tablet Take 1 tablet (90 mg total) by mouth 2 (two) times daily. 60 tablet 11    aspirin (ECOTRIN) 81 MG EC tablet Take 1 tablet (81 mg total) by mouth once daily. (Patient not taking: Reported on 9/1/2023) 90 tablet 3     No current facility-administered medications on file prior to visit.      Wt Readings from Last 3 Encounters:   09/12/23 86.3 kg (190 lb 4.1 oz)   08/29/23 87.1 kg (192 lb)     Temp Readings from Last 3 Encounters:   08/30/23 (P) 98.4 °F (36.9 °C)     BP Readings from Last 3 Encounters:   09/12/23 100/64   08/30/23 123/73     Pulse Readings from Last 3 Encounters:   09/12/23 65   08/30/23 68        Review of Systems   Constitutional: Negative.   HENT: Negative.     Eyes: Negative.    Cardiovascular: Negative.    Respiratory: Negative.     Skin: Negative.    Musculoskeletal: Negative.    Gastrointestinal: Negative.    Genitourinary: Negative.    Neurological: Negative.    Psychiatric/Behavioral: Negative.         Objective:   Physical Exam  Vitals and nursing note reviewed.   Constitutional:       Appearance: Normal appearance.   HENT:      Head: Normocephalic and atraumatic.   Eyes:      General:         Right eye: No discharge.         Left eye: No discharge.      Pupils: Pupils are equal, round, and reactive to light.   Cardiovascular:      Rate and Rhythm: Normal rate and regular rhythm.      Heart sounds: S1 normal and S2 normal. No murmur heard.     No friction rub.   Pulmonary:      Effort: Pulmonary effort is normal. No respiratory distress.      Breath sounds: Normal breath sounds. No rales.   Abdominal:       Palpations: Abdomen is soft.      Tenderness: There is no abdominal tenderness.   Musculoskeletal:      Cervical back: Neck supple.      Right lower leg: No edema.      Left lower leg: No edema.   Skin:     General: Skin is warm and dry.      Comments: Right groin [facility] site C/D/I   Neurological:      General: No focal deficit present.      Mental Status: He is alert and oriented to person, place, and time.   Psychiatric:         Mood and Affect: Mood normal.         Behavior: Behavior normal.         Thought Content: Thought content normal.         Lab Results   Component Value Date    CHOL 190 08/29/2023     Lab Results   Component Value Date    HDL 34 (L) 08/29/2023     Lab Results   Component Value Date    LDLCALC 117.6 08/29/2023     Lab Results   Component Value Date    TRIG 192 (H) 08/29/2023     Lab Results   Component Value Date    CHOLHDL 17.9 (L) 08/29/2023       Chemistry        Component Value Date/Time     08/30/2023 0832    K 4.0 08/30/2023 0832     08/30/2023 0832    CO2 25 08/30/2023 0832    BUN 14 08/30/2023 0832    CREATININE 1.1 08/30/2023 0832     (H) 08/30/2023 0832        Component Value Date/Time    CALCIUM 9.7 08/30/2023 0832    ALKPHOS 64 08/28/2023 1845    AST 13 08/28/2023 1845    ALT 20 08/28/2023 1845    BILITOT 0.3 08/28/2023 1845          Lab Results   Component Value Date    TSH 1.752 08/28/2023     Lab Results   Component Value Date    INR 1.0 08/28/2023     @RESUFAST(WBC,HGB,HCT,MCV,PLT)  @LABRCNTIP(BNP,BNPTRIAGEBLO)@  CrCl cannot be calculated (Patient's most recent lab result is older than the maximum 7 days allowed.).     Results for orders placed during the hospital encounter of 08/28/23    Echo    Interpretation Summary    Left Ventricle: The left ventricle is normal in size. Normal wall thickness. regional wall motion abnormalities present. There is mildly reduced systolic function with a visually estimated ejection fraction of 45 - 50%. There is normal  "diastolic function.    Right Ventricle: Normal right ventricular cavity size. Wall thickness is normal. Right ventricle wall motion  is normal. Systolic function is normal.    Aortic Valve: The aortic valve is a trileaflet valve. There is no stenosis. Aortic valve peak velocity is 1.15 m/s. Mean gradient is 4 mmHg. There is no significant regurgitation.    Mitral Valve: There is no stenosis. There is no significant regurgitation.    Tricuspid Valve: There is no significant regurgitation.    IVC/SVC: Normal venous pressure at 3 mmHg.     No results found for this or any previous visit.     Assessment:      1. ST elevation myocardial infarction involving right coronary artery    2. Coronary artery disease involving native coronary artery of native heart with unstable angina pectoris    3. S/P coronary artery stent placement    4. Tobacco dependence        Plan:   ST elevation myocardial infarction involving right coronary artery    Coronary artery disease involving native coronary artery of native heart with unstable angina pectoris    S/P coronary artery stent placement    Tobacco dependence    Discussed importance of compliance with blood thinner and risks for death if missing dose. Pt reports he will "try"  Smoking cessation  Low Na diet, low fat  Daily exercise as tolerated  Ok to continue working light duty  Refused cardiac rehab    Cont current CV medications  Monitor home bp    RTC as scheduled    Courtney Guillot, FNP-C [facility], Cardiology    "

## 2023-09-12 NOTE — TELEPHONE ENCOUNTER
Contacted pt regarding today's appt. Pt states he will not be able to come in until later if this afternoon. Informed pt he could come for 3:30PM. Pt vu w/o q/c    ----- Message from Anthony Zavala sent at 9/12/2023  8:33 AM CDT -----  Contact: Sawyer Jacques is needing a call back in regards to rescheduling his appt. Please give him a call back at [phone]

## 2023-10-10 ENCOUNTER — HOSPITAL ENCOUNTER (INPATIENT)
Facility: HOSPITAL | Age: 48
LOS: 5 days | Discharge: HOME | DRG: 871 | End: 2023-10-15
Attending: EMERGENCY MEDICINE | Admitting: HOSPITALIST
Payer: MEDICAID

## 2023-10-10 DIAGNOSIS — R05.9 COUGH: ICD-10-CM

## 2023-10-10 DIAGNOSIS — I50.33 ACUTE ON CHRONIC DIASTOLIC HEART FAILURE: ICD-10-CM

## 2023-10-10 DIAGNOSIS — A41.9 SEPSIS: Primary | ICD-10-CM

## 2023-10-10 DIAGNOSIS — J18.9 PNEUMONIA DUE TO INFECTIOUS ORGANISM, UNSPECIFIED LATERALITY, UNSPECIFIED PART OF LUNG: ICD-10-CM

## 2023-10-10 DIAGNOSIS — F17.200 TOBACCO DEPENDENCE: ICD-10-CM

## 2023-10-10 DIAGNOSIS — J96.01 ACUTE HYPOXEMIC RESPIRATORY FAILURE: ICD-10-CM

## 2023-10-10 DIAGNOSIS — R09.02 HYPOXIA: ICD-10-CM

## 2023-10-10 DIAGNOSIS — I50.31 ACUTE DIASTOLIC HEART FAILURE: ICD-10-CM

## 2023-10-10 DIAGNOSIS — I21.11 ST ELEVATION MYOCARDIAL INFARCTION INVOLVING RIGHT CORONARY ARTERY: ICD-10-CM

## 2023-10-10 DIAGNOSIS — J96.00 ACUTE RESPIRATORY FAILURE: ICD-10-CM

## 2023-10-10 LAB
ALBUMIN SERPL BCP-MCNC: 3.7 G/DL (ref 3.5–5.2)
ALP SERPL-CCNC: 109 U/L (ref 55–135)
ALT SERPL W/O P-5'-P-CCNC: 69 U/L (ref 10–44)
ANION GAP SERPL CALC-SCNC: 14 MMOL/L (ref 8–16)
AST SERPL-CCNC: 30 U/L (ref 10–40)
BASOPHILS # BLD AUTO: 0.08 K/UL (ref 0–0.2)
BASOPHILS NFR BLD: 0.5 % (ref 0–1.9)
BILIRUB SERPL-MCNC: 0.4 MG/DL (ref 0.1–1)
BNP SERPL-MCNC: 26 PG/ML (ref 0–99)
BUN SERPL-MCNC: 8 MG/DL (ref 6–20)
CALCIUM SERPL-MCNC: 10 MG/DL (ref 8.7–10.5)
CHLORIDE SERPL-SCNC: 101 MMOL/L (ref 95–110)
CK SERPL-CCNC: 264 U/L (ref 20–200)
CO2 SERPL-SCNC: 24 MMOL/L (ref 23–29)
CREAT SERPL-MCNC: 1 MG/DL (ref 0.5–1.4)
DIFFERENTIAL METHOD: ABNORMAL
EOSINOPHIL # BLD AUTO: 0.3 K/UL (ref 0–0.5)
EOSINOPHIL NFR BLD: 1.9 % (ref 0–8)
ERYTHROCYTE [DISTWIDTH] IN BLOOD BY AUTOMATED COUNT: 13.4 % (ref 11.5–14.5)
EST. GFR  (NO RACE VARIABLE): >60 ML/MIN/1.73 M^2
GLUCOSE SERPL-MCNC: 145 MG/DL (ref 70–110)
HCT VFR BLD AUTO: 46.7 % (ref 40–54)
HGB BLD-MCNC: 15.2 G/DL (ref 14–18)
IMM GRANULOCYTES # BLD AUTO: 0.16 K/UL (ref 0–0.04)
IMM GRANULOCYTES NFR BLD AUTO: 1 % (ref 0–0.5)
INFLUENZA A, MOLECULAR: NEGATIVE
INFLUENZA B, MOLECULAR: NEGATIVE
LACTATE SERPL-SCNC: 1.3 MMOL/L (ref 0.5–2.2)
LACTATE SERPL-SCNC: 3.2 MMOL/L (ref 0.5–2.2)
LYMPHOCYTES # BLD AUTO: 3 K/UL (ref 1–4.8)
LYMPHOCYTES NFR BLD: 18.4 % (ref 18–48)
MAGNESIUM SERPL-MCNC: 2.2 MG/DL (ref 1.6–2.6)
MCH RBC QN AUTO: 30.8 PG (ref 27–31)
MCHC RBC AUTO-ENTMCNC: 32.5 G/DL (ref 32–36)
MCV RBC AUTO: 95 FL (ref 82–98)
MONOCYTES # BLD AUTO: 1.9 K/UL (ref 0.3–1)
MONOCYTES NFR BLD: 12 % (ref 4–15)
NEUTROPHILS # BLD AUTO: 10.7 K/UL (ref 1.8–7.7)
NEUTROPHILS NFR BLD: 66.2 % (ref 38–73)
NRBC BLD-RTO: 0 /100 WBC
PLATELET # BLD AUTO: 293 K/UL (ref 150–450)
PMV BLD AUTO: 9.3 FL (ref 9.2–12.9)
POTASSIUM SERPL-SCNC: 3.9 MMOL/L (ref 3.5–5.1)
PROCALCITONIN SERPL IA-MCNC: 0.39 NG/ML
PROT SERPL-MCNC: 8.7 G/DL (ref 6–8.4)
RBC # BLD AUTO: 4.93 M/UL (ref 4.6–6.2)
SARS-COV-2 RDRP RESP QL NAA+PROBE: NEGATIVE
SODIUM SERPL-SCNC: 139 MMOL/L (ref 136–145)
SPECIMEN SOURCE: NORMAL
TROPONIN I SERPL DL<=0.01 NG/ML-MCNC: <0.006 NG/ML (ref 0–0.03)
WBC # BLD AUTO: 16.11 K/UL (ref 3.9–12.7)

## 2023-10-10 PROCEDURE — 83605 ASSAY OF LACTIC ACID: CPT | Performed by: EMERGENCY MEDICINE

## 2023-10-10 PROCEDURE — 85025 COMPLETE CBC W/AUTO DIFF WBC: CPT | Performed by: NURSE PRACTITIONER

## 2023-10-10 PROCEDURE — 25000003 PHARM REV CODE 250: Performed by: NURSE PRACTITIONER

## 2023-10-10 PROCEDURE — 94640 AIRWAY INHALATION TREATMENT: CPT

## 2023-10-10 PROCEDURE — 25000242 PHARM REV CODE 250 ALT 637 W/ HCPCS: Performed by: NURSE PRACTITIONER

## 2023-10-10 PROCEDURE — 93010 ELECTROCARDIOGRAM REPORT: CPT | Mod: ,,, | Performed by: INTERNAL MEDICINE

## 2023-10-10 PROCEDURE — 36415 COLL VENOUS BLD VENIPUNCTURE: CPT | Performed by: EMERGENCY MEDICINE

## 2023-10-10 PROCEDURE — 84145 PROCALCITONIN (PCT): CPT | Performed by: EMERGENCY MEDICINE

## 2023-10-10 PROCEDURE — 21400001 HC TELEMETRY ROOM

## 2023-10-10 PROCEDURE — 83735 ASSAY OF MAGNESIUM: CPT | Performed by: EMERGENCY MEDICINE

## 2023-10-10 PROCEDURE — 87040 BLOOD CULTURE FOR BACTERIA: CPT | Mod: 59 | Performed by: EMERGENCY MEDICINE

## 2023-10-10 PROCEDURE — 93010 EKG 12-LEAD: ICD-10-PCS | Mod: ,,, | Performed by: INTERNAL MEDICINE

## 2023-10-10 PROCEDURE — 96375 TX/PRO/DX INJ NEW DRUG ADDON: CPT

## 2023-10-10 PROCEDURE — 80053 COMPREHEN METABOLIC PANEL: CPT | Performed by: NURSE PRACTITIONER

## 2023-10-10 PROCEDURE — 63600175 PHARM REV CODE 636 W HCPCS: Performed by: EMERGENCY MEDICINE

## 2023-10-10 PROCEDURE — 93005 ELECTROCARDIOGRAM TRACING: CPT

## 2023-10-10 PROCEDURE — 96365 THER/PROPH/DIAG IV INF INIT: CPT

## 2023-10-10 PROCEDURE — 83880 ASSAY OF NATRIURETIC PEPTIDE: CPT | Performed by: NURSE PRACTITIONER

## 2023-10-10 PROCEDURE — 99285 EMERGENCY DEPT VISIT HI MDM: CPT | Mod: 25

## 2023-10-10 PROCEDURE — U0002 COVID-19 LAB TEST NON-CDC: HCPCS | Performed by: EMERGENCY MEDICINE

## 2023-10-10 PROCEDURE — 25500020 PHARM REV CODE 255: Performed by: EMERGENCY MEDICINE

## 2023-10-10 PROCEDURE — 82550 ASSAY OF CK (CPK): CPT | Performed by: NURSE PRACTITIONER

## 2023-10-10 PROCEDURE — 63600175 PHARM REV CODE 636 W HCPCS: Performed by: NURSE PRACTITIONER

## 2023-10-10 PROCEDURE — 84484 ASSAY OF TROPONIN QUANT: CPT | Performed by: NURSE PRACTITIONER

## 2023-10-10 PROCEDURE — 87502 INFLUENZA DNA AMP PROBE: CPT | Performed by: EMERGENCY MEDICINE

## 2023-10-10 PROCEDURE — 87502 INFLUENZA DNA AMP PROBE: CPT

## 2023-10-10 PROCEDURE — 25000242 PHARM REV CODE 250 ALT 637 W/ HCPCS: Performed by: EMERGENCY MEDICINE

## 2023-10-10 PROCEDURE — 94640 AIRWAY INHALATION TREATMENT: CPT | Mod: XB

## 2023-10-10 PROCEDURE — 25000003 PHARM REV CODE 250: Performed by: EMERGENCY MEDICINE

## 2023-10-10 RX ORDER — ENOXAPARIN SODIUM 100 MG/ML
40 INJECTION SUBCUTANEOUS EVERY 24 HOURS
Status: DISCONTINUED | OUTPATIENT
Start: 2023-10-10 | End: 2023-10-15 | Stop reason: HOSPADM

## 2023-10-10 RX ORDER — IBUPROFEN 200 MG
16 TABLET ORAL
Status: DISCONTINUED | OUTPATIENT
Start: 2023-10-10 | End: 2023-10-15 | Stop reason: HOSPADM

## 2023-10-10 RX ORDER — IBUPROFEN 200 MG
24 TABLET ORAL
Status: DISCONTINUED | OUTPATIENT
Start: 2023-10-10 | End: 2023-10-15 | Stop reason: HOSPADM

## 2023-10-10 RX ORDER — LOSARTAN POTASSIUM 25 MG/1
25 TABLET ORAL DAILY
Status: DISCONTINUED | OUTPATIENT
Start: 2023-10-11 | End: 2023-10-15 | Stop reason: HOSPADM

## 2023-10-10 RX ORDER — IPRATROPIUM BROMIDE AND ALBUTEROL SULFATE 2.5; .5 MG/3ML; MG/3ML
3 SOLUTION RESPIRATORY (INHALATION)
Status: COMPLETED | OUTPATIENT
Start: 2023-10-10 | End: 2023-10-10

## 2023-10-10 RX ORDER — METOPROLOL TARTRATE 50 MG/1
50 TABLET ORAL 2 TIMES DAILY
Status: DISCONTINUED | OUTPATIENT
Start: 2023-10-10 | End: 2023-10-15 | Stop reason: HOSPADM

## 2023-10-10 RX ORDER — IPRATROPIUM BROMIDE AND ALBUTEROL SULFATE 2.5; .5 MG/3ML; MG/3ML
3 SOLUTION RESPIRATORY (INHALATION) EVERY 4 HOURS PRN
Status: DISCONTINUED | OUTPATIENT
Start: 2023-10-10 | End: 2023-10-11

## 2023-10-10 RX ORDER — DEXAMETHASONE SODIUM PHOSPHATE 4 MG/ML
4 INJECTION, SOLUTION INTRA-ARTICULAR; INTRALESIONAL; INTRAMUSCULAR; INTRAVENOUS; SOFT TISSUE
Status: COMPLETED | OUTPATIENT
Start: 2023-10-10 | End: 2023-10-10

## 2023-10-10 RX ORDER — TALC
6 POWDER (GRAM) TOPICAL NIGHTLY PRN
Status: DISCONTINUED | OUTPATIENT
Start: 2023-10-10 | End: 2023-10-15 | Stop reason: HOSPADM

## 2023-10-10 RX ORDER — ASPIRIN 81 MG/1
81 TABLET ORAL DAILY
Status: DISCONTINUED | OUTPATIENT
Start: 2023-10-11 | End: 2023-10-15 | Stop reason: HOSPADM

## 2023-10-10 RX ORDER — PROCHLORPERAZINE EDISYLATE 5 MG/ML
5 INJECTION INTRAMUSCULAR; INTRAVENOUS EVERY 6 HOURS PRN
Status: DISCONTINUED | OUTPATIENT
Start: 2023-10-10 | End: 2023-10-15 | Stop reason: HOSPADM

## 2023-10-10 RX ORDER — ACETAMINOPHEN 325 MG/1
650 TABLET ORAL EVERY 4 HOURS PRN
Status: DISCONTINUED | OUTPATIENT
Start: 2023-10-10 | End: 2023-10-15 | Stop reason: HOSPADM

## 2023-10-10 RX ORDER — ONDANSETRON 2 MG/ML
4 INJECTION INTRAMUSCULAR; INTRAVENOUS EVERY 8 HOURS PRN
Status: DISCONTINUED | OUTPATIENT
Start: 2023-10-10 | End: 2023-10-15 | Stop reason: HOSPADM

## 2023-10-10 RX ORDER — GLUCAGON 1 MG
1 KIT INJECTION
Status: DISCONTINUED | OUTPATIENT
Start: 2023-10-10 | End: 2023-10-15 | Stop reason: HOSPADM

## 2023-10-10 RX ORDER — ALBUTEROL SULFATE 0.83 MG/ML
2.5 SOLUTION RESPIRATORY (INHALATION)
Status: COMPLETED | OUTPATIENT
Start: 2023-10-10 | End: 2023-10-10

## 2023-10-10 RX ORDER — ATORVASTATIN CALCIUM 40 MG/1
80 TABLET, FILM COATED ORAL DAILY
Status: DISCONTINUED | OUTPATIENT
Start: 2023-10-11 | End: 2023-10-15 | Stop reason: HOSPADM

## 2023-10-10 RX ADMIN — ALBUTEROL SULFATE 2.5 MG: 2.5 SOLUTION RESPIRATORY (INHALATION) at 05:10

## 2023-10-10 RX ADMIN — IOHEXOL 100 ML: 350 INJECTION, SOLUTION INTRAVENOUS at 05:10

## 2023-10-10 RX ADMIN — TICAGRELOR 90 MG: 90 TABLET ORAL at 10:10

## 2023-10-10 RX ADMIN — SODIUM CHLORIDE 500 ML: 9 INJECTION, SOLUTION INTRAVENOUS at 09:10

## 2023-10-10 RX ADMIN — IPRATROPIUM BROMIDE AND ALBUTEROL SULFATE 3 ML: 2.5; .5 SOLUTION RESPIRATORY (INHALATION) at 03:10

## 2023-10-10 RX ADMIN — CEFTRIAXONE 1 G: 1 INJECTION, POWDER, FOR SOLUTION INTRAMUSCULAR; INTRAVENOUS at 06:10

## 2023-10-10 RX ADMIN — METOPROLOL TARTRATE 50 MG: 50 TABLET, FILM COATED ORAL at 10:10

## 2023-10-10 RX ADMIN — DEXAMETHASONE SODIUM PHOSPHATE 4 MG: 4 INJECTION INTRA-ARTICULAR; INTRALESIONAL; INTRAMUSCULAR; INTRAVENOUS; SOFT TISSUE at 05:10

## 2023-10-10 RX ADMIN — DOXYCYCLINE 100 MG: 100 INJECTION, POWDER, LYOPHILIZED, FOR SOLUTION INTRAVENOUS at 07:10

## 2023-10-10 RX ADMIN — ENOXAPARIN SODIUM 40 MG: 40 INJECTION SUBCUTANEOUS at 09:10

## 2023-10-10 NOTE — PHARMACY MED REC
"Admission Medication History     The home medication history was taken by Maritza Bethea.    You may go to "Admission" then "Reconcile Home Medications" tabs to review and/or act upon these items.     The home medication list has been updated by the Pharmacy department.   Please read ALL comments highlighted in yellow.   Please address this information as you see fit.    Feel free to contact us if you have any questions or require assistance.        Medications listed below were obtained from: Patient/family and Analytic software- [email or web address]  (Not in a hospital admission)          Maritza Bethea  [id number]      Current Outpatient Medications on File Prior to Encounter   Medication Sig Dispense Refill Last Dose    aspirin (ECOTRIN) 81 MG EC tablet Take 1 tablet (81 mg total) by mouth once daily. 90 tablet 3 10/10/2023    atorvastatin (LIPITOR) 80 MG tablet Take 1 tablet (80 mg total) by mouth once daily. 90 tablet 3 10/9/2023    losartan (COZAAR) 25 MG tablet Take 1 tablet (25 mg total) by mouth once daily. 90 tablet 3 10/9/2023    metoprolol tartrate (LOPRESSOR) 50 MG tablet Take 1 tablet (50 mg total) by mouth 2 (two) times daily. 60 tablet 11 10/9/2023    ticagrelor (BRILINTA) 90 mg tablet Take 1 tablet (90 mg total) by mouth 2 (two) times daily. 60 tablet 11 10/9/2023                           .          "

## 2023-10-10 NOTE — Clinical Note
Diagnosis: Sepsis [820768]   Admitting Provider:: PAT TRACY [[id number]]   Future Attending Provider: PAT TRACY [[id number]]   Reason for IP Medical Treatment  (Clinical interventions that can only be accomplished in the IP setting? ) :: blood cultures   I certify that Inpatient services for greater than or equal to 2 midnights are medically necessary:: Yes   Plans for Post-Acute care--if anticipated (pick the single best option):: A. No post acute care anticipated at this time

## 2023-10-10 NOTE — ED PROVIDER NOTES
SCRIBE #1 NOTE: I, Loly Rodarte, am scribing for, and in the presence of, Kelly Bynum DO. I have scribed the entire note.       History     Chief Complaint   Patient presents with    Shortness of Breath     Pt states that he started having SOB on Thursday hx of MI x 2 months ago VSS Pt AAOX4      Review of patient's allergies indicates:  No Known Allergies      History of Present Illness     HPI    10/10/2023, 4:14 PM  History obtained from the patient      History of Present Illness: Sawyer Hammond is a 48 y.o. male patient with a PMHx of MI, CAD, Tobacco use who presents to the Emergency Department for evaluation of sob which onset Thursday. Pt states he has been having upper respiratory congestion and postnasal drip. Pt says today he feels better but urgent care suggested he present to the ED. Pt states he is a smoker. Symptoms are constant and moderate in severity. No mitigating or exacerbating factors reported. Associated sxs include cough, fever, diarrhea, sob, postnasal drip . Patient denies any n/v palpitation, weakness, cp, and all other sxs at this time. No prior Tx reported. No further complaints or concerns at this time.       Arrival mode: Personal vehicle      PCP: Melva, Primary Doctor        Past Medical History:  Past Medical History:   Diagnosis Date    Coronary artery disease involving native coronary artery of native heart with unstable angina pectoris 8/28/2023    ST elevation myocardial infarction involving right coronary artery 8/28/2023    Tobacco use 8/28/2023       Past Surgical History:  Past Surgical History:   Procedure Laterality Date    LEFT HEART CATHETERIZATION Left 8/28/2023    Procedure: Left heart cath;  Surgeon: Leola Dodson MD;  Location: [facility] CATH LAB;  Service: Cardiology;  Laterality: Left;    PERCUTANEOUS CORONARY INTERVENTION, ARTERY N/A 8/28/2023    Procedure: Percutaneous coronary intervention;  Surgeon: Leola Dodson MD;  Location: [facility] CATH LAB;   Service: Cardiology;  Laterality: N/A;    STENT, DRUG ELUTING, SINGLE VESSEL, CORONARY  8/28/2023    Procedure: Stent, Drug Eluting, Single Vessel, Coronary;  Surgeon: Leola Dodson MD;  Location: [facility] CATH LAB;  Service: Cardiology;;    THROMBECTOMY, CORONARY  8/28/2023    Procedure: Thrombectomy, Coronary;  Surgeon: Leola Dodson MD;  Location: [facility] CATH LAB;  Service: Cardiology;;  Using aspiration catheter         Family History:  No family history on file.    Social History:  Social History     Tobacco Use    Smoking status: Not on file    Smokeless tobacco: Not on file   Substance and Sexual Activity    Alcohol use: Not on file    Drug use: Not on file    Sexual activity: Not on file        Review of Systems     Review of Systems   Constitutional:  Positive for fever.   HENT:  Positive for postnasal drip.    Respiratory:  Positive for cough and shortness of breath.    Cardiovascular:  Negative for chest pain and palpitations.   Gastrointestinal:  Positive for diarrhea. Negative for nausea and vomiting.   Neurological:  Negative for weakness.        Physical Exam     Initial Vitals   BP Pulse Resp Temp SpO2   10/10/23 1443 10/10/23 1443 10/10/23 1443 10/10/23 1506 10/10/23 1443   (!) 149/87 84 18 98.9 °F (37.2 °C) (!) 94 %      MAP       --                 Physical Exam  Nursing Notes and Vital Signs Reviewed.  Constitutional: Patient is in mild distress. Well-developed and well-nourished.  Head: Atraumatic. Normocephalic.  Eyes: PERRL. EOM intact. Conjunctivae are not pale. No scleral icterus.  ENT: Mucous membranes are moist. Oropharynx is clear and symmetric.    Neck: Supple. Full ROM. No lymphadenopathy.  Cardiovascular: Regular rate. Regular rhythm. No murmurs, rubs, or gallops. Distal pulses are 2+ and symmetric.  Pulmonary/Chest: Labored breathing using accessory muscles. Expiratory wheezing. Diminished lung sounds. Tachypnea   Abdominal: Soft and non-distended.  There is no tenderness.  No  rebound, guarding, or rigidity. Good bowel sounds.  Genitourinary: No CVA tenderness  Musculoskeletal: Moves all extremities. No obvious deformities. No edema. No calf tenderness.  Skin: Warm and dry.  Neurological:  Alert, awake, and appropriate.  Normal speech.  No acute focal neurological deficits are appreciated.  Psychiatric: Normal affect. Good eye contact. Appropriate in content.     ED Course   Critical Care    Date/Time: 10/10/2023 7:06 PM    Performed by: Kelly Bynum DO  Authorized by: Kelly Bynum DO  Direct patient critical care time: 11 minutes  Additional history critical care time: 7 minutes  Ordering / reviewing critical care time: 6 minutes  Documentation critical care time: 5 minutes  Consulting other physicians critical care time: 7 minutes  Consult with family critical care time: 5 minutes  Total critical care time (exclusive of procedural time) : 41 minutes  Critical care time was exclusive of separately billable procedures and treating other patients and teaching time.  Critical care was necessary to treat or prevent imminent or life-threatening deterioration of the following conditions: sepsis and respiratory failure.  Critical care was time spent personally by me on the following activities: development of treatment plan with patient or surrogate, discussions with consultants, evaluation of patient's response to treatment, examination of patient, obtaining history from patient or surrogate, ordering and performing treatments and interventions, ordering and review of laboratory studies, ordering and review of radiographic studies, pulse oximetry and re-evaluation of patient's condition.        ED Vital Signs:  Vitals:    10/10/23 1443 10/10/23 1506 10/10/23 1525 10/10/23 1606   BP: (!) 149/87      Pulse: 84  88 89   Resp: 18  20 20   Temp:  98.9 °F (37.2 °C)     TempSrc:  Oral     SpO2: (!) 94%  (!) 91% 96%   Weight: 86.4 kg (190 lb 7.6 oz)       10/10/23 1640 10/10/23 1715 10/10/23  1720 10/10/23 1947   BP:    (!) 143/80   Pulse: 81 98 96    Resp:  (!) 24 (!) 24    Temp:       TempSrc:       SpO2:  95% 96%    Weight:        10/10/23 1954   BP:    Pulse: 64   Resp: (!) 22   Temp: 98.2 °F (36.8 °C)   TempSrc: Oral   SpO2: 95%   Weight:        Abnormal Lab Results:  Labs Reviewed   CBC W/ AUTO DIFFERENTIAL - Abnormal; Notable for the following components:       Result Value    WBC 16.11 (*)     Immature Granulocytes 1.0 (*)     Gran # (ANC) 10.7 (*)     Immature Grans (Abs) 0.16 (*)     Mono # 1.9 (*)     All other components within normal limits   COMPREHENSIVE METABOLIC PANEL - Abnormal; Notable for the following components:    Glucose 145 (*)     Total Protein 8.7 (*)     ALT 69 (*)     All other components within normal limits   CK - Abnormal; Notable for the following components:     (*)     All other components within normal limits   PROCALCITONIN - Abnormal; Notable for the following components:    Procalcitonin 0.39 (*)     All other components within normal limits   INFLUENZA A & B BY MOLECULAR   CULTURE, BLOOD   CULTURE, BLOOD   TROPONIN I   B-TYPE NATRIURETIC PEPTIDE   LACTIC ACID, PLASMA   MAGNESIUM   SARS-COV-2 RNA AMPLIFICATION, QUAL   LACTIC ACID, PLASMA        All Lab Results:  Results for orders placed or performed during the hospital encounter of 10/10/23   Influenza A & B by Molecular    Specimen: Nasopharyngeal Swab   Result Value Ref Range    Influenza A, Molecular Negative Negative    Influenza B, Molecular Negative Negative    Flu A & B Source Nasal swab    CBC auto differential   Result Value Ref Range    WBC 16.11 (H) 3.90 - 12.70 K/uL    RBC 4.93 4.60 - 6.20 M/uL    Hemoglobin 15.2 14.0 - 18.0 g/dL    Hematocrit 46.7 40.0 - 54.0 %    MCV 95 82 - 98 fL    MCH 30.8 27.0 - 31.0 pg    MCHC 32.5 32.0 - 36.0 g/dL    RDW 13.4 11.5 - 14.5 %    Platelets 293 150 - 450 K/uL    MPV 9.3 9.2 - 12.9 fL    Immature Granulocytes 1.0 (H) 0.0 - 0.5 %    Gran # (ANC) 10.7 (H) 1.8 -  7.7 K/uL    Immature Grans (Abs) 0.16 (H) 0.00 - 0.04 K/uL    Lymph # 3.0 1.0 - 4.8 K/uL    Mono # 1.9 (H) 0.3 - 1.0 K/uL    Eos # 0.3 0.0 - 0.5 K/uL    Baso # 0.08 0.00 - 0.20 K/uL    nRBC 0 0 /100 WBC    Gran % 66.2 38.0 - 73.0 %    Lymph % 18.4 18.0 - 48.0 %    Mono % 12.0 4.0 - 15.0 %    Eosinophil % 1.9 0.0 - 8.0 %    Basophil % 0.5 0.0 - 1.9 %    Differential Method Automated    Comprehensive metabolic panel   Result Value Ref Range    Sodium 139 136 - 145 mmol/L    Potassium 3.9 3.5 - 5.1 mmol/L    Chloride 101 95 - 110 mmol/L    CO2 24 23 - 29 mmol/L    Glucose 145 (H) 70 - 110 mg/dL    BUN 8 6 - 20 mg/dL    Creatinine 1.0 0.5 - 1.4 mg/dL    Calcium 10.0 8.7 - 10.5 mg/dL    Total Protein 8.7 (H) 6.0 - 8.4 g/dL    Albumin 3.7 3.5 - 5.2 g/dL    Total Bilirubin 0.4 0.1 - 1.0 mg/dL    Alkaline Phosphatase 109 55 - 135 U/L    AST 30 10 - 40 U/L    ALT 69 (H) 10 - 44 U/L    eGFR >60 >60 mL/min/1.73 m^2    Anion Gap 14 8 - 16 mmol/L   CPK   Result Value Ref Range     (H) 20 - 200 U/L   Troponin I   Result Value Ref Range    Troponin I <0.006 0.000 - 0.026 ng/mL   Brain natriuretic peptide   Result Value Ref Range    BNP 26 0 - 99 pg/mL   Lactic acid, plasma #1   Result Value Ref Range    Lactate (Lactic Acid) 1.3 0.5 - 2.2 mmol/L   Magnesium   Result Value Ref Range    Magnesium 2.2 1.6 - 2.6 mg/dL   Procalcitonin   Result Value Ref Range    Procalcitonin 0.39 (H) <0.25 ng/mL   COVID-19 Rapid Screening   Result Value Ref Range    SARS-CoV-2 RNA, Amplification, Qual Negative Negative        Imaging Results:  Imaging Results              CTA Chest Non-Coronary (PE Studies) (Final result)  Result time 10/10/23 18:56:35      Final result by Jean Claude Marte MD (10/10/23 18:56:35)                   Impression:      Suboptimal bolus with mixing artifacts in the lower lobes.  Limited study as such.  However no pulmonary embolism.  No dissection.    Mild motion artifacts are noted.  However there is suggestion tree  in bud opacities throughout the lung with focal areas of see patchy ground-glass opacities.  Correlate clinically for endobronchial infectious process.    All CT scans   are performed using dose optimization techniques including the following: automated exposure control; adjustment of the mA and/or kV; use of iterative reconstruction technique.  Dose modulation was employed for ALARA by means of: Automated exposure control; adjustment of the mA and/or kV according to patient size (this includes techniques or standardized protocols for targeted exams where dose is matched to indication/reason for exam; i.e. extremities or head); and/or use of iterative reconstructive technique.      Electronically signed by: Jean Claude Marte  Date:    10/10/2023  Time:    18:56               Narrative:    EXAMINATION:  CTA CHEST NON CORONARY (PE STUDIES)    CLINICAL HISTORY:  Pulmonary embolism (PE) suspected, unknown D-dimer;    TECHNIQUE:  Low dose axial images, sagittal and coronal reformations were obtained from the thoracic inlet to the lung bases following the IV administration of 100 mL of Omnipaque 350.  Contrast timing was optimized to evaluate the pulmonary arteries.  MIP images were performed.    COMPARISON:  None    FINDINGS:  Mixing artifacts noted in the bilateral lower lobe pulmonary are arteries.  Suboptimal bolus achieved.  Tree in bud opacities throughout the lung suggestive of a endobronchial process possibly infectious.  Subpleural ground-glass and reticular opacities in the left upper lobe right upper lobe and basilar segment mild motion artifacts.                                       X-Ray Chest 1 View (Final result)  Result time 10/10/23 15:28:15      Final result by Gibran Bey MD (10/10/23 15:28:15)                   Impression:      No acute process seen.      Electronically signed by: Gibran Bey MD  Date:    10/10/2023  Time:    15:28               Narrative:    EXAMINATION:  XR CHEST 1  VIEW    CLINICAL HISTORY:  Cough, unspecified    FINDINGS:  Single view of the chest.  Comparison 08/28/2023    Cardiac silhouette is normal.  The lungs demonstrate no evidence of active disease.  No evidence of pleural effusion or pneumothorax.  Bones appear intact.                                       The EKG was ordered, reviewed, and independently interpreted by the ED provider.  Interpretation time: 14:42  Rate: 83 BPM  Rhythm: Normal Sinus Rhythm  Interpretation: No acute ST changes. No STEMI.             The Emergency Provider reviewed the vital signs and test results, which are outlined above.     ED Discussion       7:55 PM: Discussed case with Dr. Santiago ([facility]). Dr. Santiago agrees with current care and management of pt and accepts admission.   Admitting Service: [facility]  Admitting Physician: Dr. Santiago  Admit to: Med/tele inpatient      7:55 PM: Re-evaluated pt. I have discussed test results, shared treatment plan, and the need for admission with patient and family at bedside. Pt and family express understanding at this time and agree with all information. All questions answered. Pt and family have no further questions or concerns at this time. Pt is ready for admit.     ED Course as of 10/10/23 1956   Tue Oct 10, 2023   1905 Tolerating PO. [NF]   1917 48-year-old male presents with sepsis secondary to multifocal pneumonia with hypoxia.  White blood cell count 16.  CTA confirms multifocal pneumonia.  Influenza and COVID are negative.  Procalcitonin elevated at 0.39 with normal lactic acid.  EKG and troponin are negative for cardiac ischemia.  BNP negative for CHF.  Renal function and electrolytes are normal.  Blood cultures pending.  Receiving IV Rocephin and doxycycline. [NF]      ED Course User Index  [NF] Kelly Bynum, DO     Medical Decision Making  Amount and/or Complexity of Data Reviewed  Labs: ordered. Decision-making details documented in ED Course.  Radiology:  ordered and independent interpretation performed. Decision-making details documented in ED Course.  ECG/medicine tests: ordered and independent interpretation performed. Decision-making details documented in ED Course.    Risk  Prescription drug management.  Decision regarding hospitalization.           Medical Decision Making:   Clinical Tests:   Lab Tests: Ordered and Reviewed  Radiological Study: Ordered and Reviewed  Medical Tests: Ordered and Reviewed      ED Medication(s):  Medications   doxycycline (VIBRAMYCIN) 100 mg in dextrose 5 % in water (D5W) 100 mL IVPB (MB+) (100 mg Intravenous New Bag 10/10/23 1950)   albuterol-ipratropium 2.5 mg-0.5 mg/3 mL nebulizer solution 3 mL (3 mLs Nebulization Given 10/10/23 1525)   albuterol nebulizer solution 2.5 mg (2.5 mg Nebulization Given 10/10/23 1720)   dexAMETHasone injection 4 mg (4 mg Intravenous Given 10/10/23 1713)   cefTRIAXone (ROCEPHIN) 1 g in dextrose 5 % in water (D5W) 100 mL IVPB (MB+) (0 g Intravenous Stopped 10/10/23 1928)   iohexoL (OMNIPAQUE 350) injection 100 mL (100 mLs Intravenous Given 10/10/23 1759)       New Prescriptions    No medications on file               Scribe Attestation:   Scribe #1: I performed the above scribed service and the documentation accurately describes the services I performed. I attest to the accuracy of the note.     Attending:   Physician Attestation Statement for Scribe #1: I, Kelly Bynum DO, personally performed the services described in this documentation, as scribed by Loly Rodarte, in my presence, and it is both accurate and complete.           Clinical Impression       ICD-10-CM ICD-9-CM   1. Sepsis  A41.9 038.9     995.91   2. Cough  R05.9 786.2   3. Acute respiratory failure  J96.00 518.81   4. Pneumonia due to infectious organism, unspecified laterality, unspecified part of lung  J18.9 486   5. Hypoxia  R09.02 799.02       Disposition:   Disposition: Admitted  Condition: Fair        Kelly Bynum,  DO  10/11/23 1201

## 2023-10-10 NOTE — FIRST PROVIDER EVALUATION
Medical screening examination initiated.  I have conducted a focused provider triage encounter, findings are as follows:    Brief history of present illness: 48 year old male with complaint of cough, congestion, and SOB X 4 days. Reports tested negative at urgent care today for COVID.     Vitals:    10/10/23 1443   BP: (!) 149/87   Pulse: 84   Resp: 18   SpO2: (!) 94%   Weight: 86.4 kg (190 lb 7.6 oz)       Pertinent physical exam:  bi-basilar rales

## 2023-10-11 LAB
ALBUMIN SERPL BCP-MCNC: 3.5 G/DL (ref 3.5–5.2)
ALP SERPL-CCNC: 114 U/L (ref 55–135)
ALT SERPL W/O P-5'-P-CCNC: 83 U/L (ref 10–44)
ANION GAP SERPL CALC-SCNC: 13 MMOL/L (ref 8–16)
AORTIC ROOT ANNULUS: 2.94 CM
ASCENDING AORTA: 2.81 CM
AST SERPL-CCNC: 37 U/L (ref 10–40)
AV INDEX (PROSTH): 0.9
AV MEAN GRADIENT: 5 MMHG
AV PEAK GRADIENT: 9 MMHG
AV VALVE AREA BY VELOCITY RATIO: 2.5 CM²
AV VALVE AREA: 2.71 CM²
AV VELOCITY RATIO: 0.83
BASOPHILS # BLD AUTO: 0.09 K/UL (ref 0–0.2)
BASOPHILS NFR BLD: 0.6 % (ref 0–1.9)
BILIRUB SERPL-MCNC: 0.3 MG/DL (ref 0.1–1)
BSA FOR ECHO PROCEDURE: 2.06 M2
BUN SERPL-MCNC: 10 MG/DL (ref 6–20)
CALCIUM SERPL-MCNC: 10.2 MG/DL (ref 8.7–10.5)
CHLORIDE SERPL-SCNC: 100 MMOL/L (ref 95–110)
CO2 SERPL-SCNC: 28 MMOL/L (ref 23–29)
CREAT SERPL-MCNC: 0.9 MG/DL (ref 0.5–1.4)
CV ECHO LV RWT: 0.45 CM
DIFFERENTIAL METHOD: ABNORMAL
DOP CALC AO PEAK VEL: 1.46 M/S
DOP CALC AO VTI: 30.4 CM
DOP CALC LVOT AREA: 3 CM2
DOP CALC LVOT DIAMETER: 1.96 CM
DOP CALC LVOT PEAK VEL: 1.21 M/S
DOP CALC LVOT STROKE VOLUME: 82.33 CM3
DOP CALC RVOT PEAK VEL: 0.56 M/S
DOP CALC RVOT VTI: 10.4 CM
DOP CALCLVOT PEAK VEL VTI: 27.3 CM
E WAVE DECELERATION TIME: 163.21 MSEC
E/A RATIO: 0.96
E/E' RATIO: 6 M/S
ECHO LV POSTERIOR WALL: 1.03 CM (ref 0.6–1.1)
EJECTION FRACTION: 55 %
EOSINOPHIL # BLD AUTO: 0 K/UL (ref 0–0.5)
EOSINOPHIL NFR BLD: 0.1 % (ref 0–8)
ERYTHROCYTE [DISTWIDTH] IN BLOOD BY AUTOMATED COUNT: 13.4 % (ref 11.5–14.5)
EST. GFR  (NO RACE VARIABLE): >60 ML/MIN/1.73 M^2
ETHANOL SERPL-MCNC: <10 MG/DL
FRACTIONAL SHORTENING: 35 % (ref 28–44)
GLUCOSE SERPL-MCNC: 135 MG/DL (ref 70–110)
HCT VFR BLD AUTO: 48 % (ref 40–54)
HGB BLD-MCNC: 15.7 G/DL (ref 14–18)
IMM GRANULOCYTES # BLD AUTO: 0.31 K/UL (ref 0–0.04)
IMM GRANULOCYTES NFR BLD AUTO: 2 % (ref 0–0.5)
INTERVENTRICULAR SEPTUM: 1.3 CM (ref 0.6–1.1)
IVC DIAMETER: 1.76 CM
IVRT: 79.92 MSEC
LA MAJOR: 4.48 CM
LA MINOR: 4.37 CM
LA WIDTH: 2.9 CM
LEFT ATRIUM SIZE: 3.24 CM
LEFT ATRIUM VOLUME INDEX: 17.3 ML/M2
LEFT ATRIUM VOLUME: 35.34 CM3
LEFT INTERNAL DIMENSION IN SYSTOLE: 2.94 CM (ref 2.1–4)
LEFT VENTRICLE DIASTOLIC VOLUME INDEX: 46.17 ML/M2
LEFT VENTRICLE DIASTOLIC VOLUME: 94.18 ML
LEFT VENTRICLE MASS INDEX: 94 G/M2
LEFT VENTRICLE SYSTOLIC VOLUME INDEX: 16.4 ML/M2
LEFT VENTRICLE SYSTOLIC VOLUME: 33.37 ML
LEFT VENTRICULAR INTERNAL DIMENSION IN DIASTOLE: 4.54 CM (ref 3.5–6)
LEFT VENTRICULAR MASS: 192.51 G
LV LATERAL E/E' RATIO: 6.25 M/S
LV SEPTAL E/E' RATIO: 5.77 M/S
LVOT MG: 3.87 MMHG
LVOT MV: 0.97 CM/S
LYMPHOCYTES # BLD AUTO: 2.6 K/UL (ref 1–4.8)
LYMPHOCYTES NFR BLD: 16.5 % (ref 18–48)
MAGNESIUM SERPL-MCNC: 2.2 MG/DL (ref 1.6–2.6)
MCH RBC QN AUTO: 31.4 PG (ref 27–31)
MCHC RBC AUTO-ENTMCNC: 32.7 G/DL (ref 32–36)
MCV RBC AUTO: 96 FL (ref 82–98)
MONOCYTES # BLD AUTO: 1.5 K/UL (ref 0.3–1)
MONOCYTES NFR BLD: 9.5 % (ref 4–15)
MV PEAK A VEL: 0.78 M/S
MV PEAK E VEL: 0.75 M/S
MV STENOSIS PRESSURE HALF TIME: 47.33 MS
MV VALVE AREA P 1/2 METHOD: 4.65 CM2
NEUTROPHILS # BLD AUTO: 11.3 K/UL (ref 1.8–7.7)
NEUTROPHILS NFR BLD: 71.3 % (ref 38–73)
NRBC BLD-RTO: 0 /100 WBC
PHOSPHATE SERPL-MCNC: 4.7 MG/DL (ref 2.7–4.5)
PISA TR MAX VEL: 1.67 M/S
PLATELET # BLD AUTO: 311 K/UL (ref 150–450)
PMV BLD AUTO: 8.8 FL (ref 9.2–12.9)
POTASSIUM SERPL-SCNC: 4.5 MMOL/L (ref 3.5–5.1)
PROT SERPL-MCNC: 8.9 G/DL (ref 6–8.4)
PV MEAN GRADIENT: 1 MMHG
RA MAJOR: 3.5 CM
RA PRESSURE ESTIMATED: 3 MMHG
RA WIDTH: 2.7 CM
RBC # BLD AUTO: 5 M/UL (ref 4.6–6.2)
RV TB RVSP: 5 MMHG
SODIUM SERPL-SCNC: 141 MMOL/L (ref 136–145)
STJ: 2.92 CM
TDI LATERAL: 0.12 M/S
TDI SEPTAL: 0.13 M/S
TDI: 0.13 M/S
TR MAX PG: 11 MMHG
TRICUSPID ANNULAR PLANE SYSTOLIC EXCURSION: 2.31 CM
TV REST PULMONARY ARTERY PRESSURE: 14 MMHG
WBC # BLD AUTO: 15.79 K/UL (ref 3.9–12.7)
Z-SCORE OF LEFT VENTRICULAR DIMENSION IN END DIASTOLE: -2.92
Z-SCORE OF LEFT VENTRICULAR DIMENSION IN END SYSTOLE: -1.87

## 2023-10-11 PROCEDURE — 21400001 HC TELEMETRY ROOM

## 2023-10-11 PROCEDURE — 25000003 PHARM REV CODE 250: Performed by: NURSE PRACTITIONER

## 2023-10-11 PROCEDURE — 36415 COLL VENOUS BLD VENIPUNCTURE: CPT | Performed by: FAMILY MEDICINE

## 2023-10-11 PROCEDURE — 63600175 PHARM REV CODE 636 W HCPCS: Performed by: NURSE PRACTITIONER

## 2023-10-11 PROCEDURE — 27000221 HC OXYGEN, UP TO 24 HOURS

## 2023-10-11 PROCEDURE — 94799 UNLISTED PULMONARY SVC/PX: CPT

## 2023-10-11 PROCEDURE — 87205 SMEAR GRAM STAIN: CPT | Performed by: FAMILY MEDICINE

## 2023-10-11 PROCEDURE — 94761 N-INVAS EAR/PLS OXIMETRY MLT: CPT

## 2023-10-11 PROCEDURE — 83735 ASSAY OF MAGNESIUM: CPT | Performed by: NURSE PRACTITIONER

## 2023-10-11 PROCEDURE — 25000003 PHARM REV CODE 250: Performed by: HOSPITALIST

## 2023-10-11 PROCEDURE — 85025 COMPLETE CBC W/AUTO DIFF WBC: CPT | Performed by: NURSE PRACTITIONER

## 2023-10-11 PROCEDURE — 25000242 PHARM REV CODE 250 ALT 637 W/ HCPCS: Performed by: FAMILY MEDICINE

## 2023-10-11 PROCEDURE — 99900035 HC TECH TIME PER 15 MIN (STAT)

## 2023-10-11 PROCEDURE — 80053 COMPREHEN METABOLIC PANEL: CPT | Performed by: NURSE PRACTITIONER

## 2023-10-11 PROCEDURE — 25000003 PHARM REV CODE 250: Performed by: FAMILY MEDICINE

## 2023-10-11 PROCEDURE — 94640 AIRWAY INHALATION TREATMENT: CPT

## 2023-10-11 PROCEDURE — 87070 CULTURE OTHR SPECIMN AEROBIC: CPT | Performed by: FAMILY MEDICINE

## 2023-10-11 PROCEDURE — 84100 ASSAY OF PHOSPHORUS: CPT | Performed by: NURSE PRACTITIONER

## 2023-10-11 PROCEDURE — 36415 COLL VENOUS BLD VENIPUNCTURE: CPT | Performed by: NURSE PRACTITIONER

## 2023-10-11 PROCEDURE — 82077 ASSAY SPEC XCP UR&BREATH IA: CPT | Performed by: FAMILY MEDICINE

## 2023-10-11 RX ORDER — IPRATROPIUM BROMIDE AND ALBUTEROL SULFATE 2.5; .5 MG/3ML; MG/3ML
3 SOLUTION RESPIRATORY (INHALATION) EVERY 6 HOURS
Status: DISCONTINUED | OUTPATIENT
Start: 2023-10-11 | End: 2023-10-13

## 2023-10-11 RX ORDER — IPRATROPIUM BROMIDE AND ALBUTEROL SULFATE 2.5; .5 MG/3ML; MG/3ML
3 SOLUTION RESPIRATORY (INHALATION)
Status: DISCONTINUED | OUTPATIENT
Start: 2023-10-11 | End: 2023-10-11

## 2023-10-11 RX ORDER — SODIUM CHLORIDE 9 MG/ML
INJECTION, SOLUTION INTRAVENOUS CONTINUOUS
Status: ACTIVE | OUTPATIENT
Start: 2023-10-11 | End: 2023-10-11

## 2023-10-11 RX ORDER — GUAIFENESIN/DEXTROMETHORPHAN 100-10MG/5
10 SYRUP ORAL EVERY 4 HOURS PRN
Status: DISCONTINUED | OUTPATIENT
Start: 2023-10-11 | End: 2023-10-15 | Stop reason: HOSPADM

## 2023-10-11 RX ADMIN — TICAGRELOR 90 MG: 90 TABLET ORAL at 08:10

## 2023-10-11 RX ADMIN — IPRATROPIUM BROMIDE AND ALBUTEROL SULFATE 3 ML: .5; 3 SOLUTION RESPIRATORY (INHALATION) at 12:10

## 2023-10-11 RX ADMIN — LOSARTAN POTASSIUM 25 MG: 25 TABLET, FILM COATED ORAL at 09:10

## 2023-10-11 RX ADMIN — METOPROLOL TARTRATE 50 MG: 50 TABLET, FILM COATED ORAL at 08:10

## 2023-10-11 RX ADMIN — SODIUM CHLORIDE: 9 INJECTION, SOLUTION INTRAVENOUS at 10:10

## 2023-10-11 RX ADMIN — ASPIRIN 81 MG: 81 TABLET, COATED ORAL at 09:10

## 2023-10-11 RX ADMIN — TICAGRELOR 90 MG: 90 TABLET ORAL at 09:10

## 2023-10-11 RX ADMIN — GUAIFENESIN AND DEXTROMETHORPHAN 10 ML: 100; 10 SYRUP ORAL at 08:10

## 2023-10-11 RX ADMIN — DOXYCYCLINE 100 MG: 100 INJECTION, POWDER, LYOPHILIZED, FOR SOLUTION INTRAVENOUS at 08:10

## 2023-10-11 RX ADMIN — IPRATROPIUM BROMIDE AND ALBUTEROL SULFATE 3 ML: 2.5; .5 SOLUTION RESPIRATORY (INHALATION) at 07:10

## 2023-10-11 RX ADMIN — ENOXAPARIN SODIUM 40 MG: 40 INJECTION SUBCUTANEOUS at 06:10

## 2023-10-11 RX ADMIN — ATORVASTATIN CALCIUM 80 MG: 40 TABLET, FILM COATED ORAL at 09:10

## 2023-10-11 RX ADMIN — CEFTRIAXONE 1 G: 1 INJECTION, POWDER, FOR SOLUTION INTRAMUSCULAR; INTRAVENOUS at 06:10

## 2023-10-11 RX ADMIN — DOXYCYCLINE 100 MG: 100 INJECTION, POWDER, LYOPHILIZED, FOR SOLUTION INTRAVENOUS at 09:10

## 2023-10-11 RX ADMIN — IPRATROPIUM BROMIDE AND ALBUTEROL SULFATE 3 ML: .5; 3 SOLUTION RESPIRATORY (INHALATION) at 08:10

## 2023-10-11 RX ADMIN — METOPROLOL TARTRATE 50 MG: 50 TABLET, FILM COATED ORAL at 09:10

## 2023-10-11 NOTE — PLAN OF CARE
A209/A209 ELIUDRica Hammond is a 48 y.o.male admitted on 10/10/2023 for Sepsis   Code Status: Full Code MRN: [id number]   Review of patient's allergies indicates:  No Known Allergies  Past Medical History:   Diagnosis Date    Coronary artery disease involving native coronary artery of native heart with unstable angina pectoris 8/28/2023    ST elevation myocardial infarction involving right coronary artery 8/28/2023    Tobacco use 8/28/2023      PRN meds    acetaminophen, 650 mg, Q4H PRN  dextrose 10%, 12.5 g, PRN  dextrose 10%, 25 g, PRN  glucagon (human recombinant), 1 mg, PRN  glucose, 16 g, PRN  glucose, 24 g, PRN  melatonin, 6 mg, Nightly PRN  ondansetron, 4 mg, Q8H PRN  prochlorperazine, 5 mg, Q6H PRN      Chart check completed. Will continue plan of care.      Orientation: oriented x 4  [place] Coma Scale Score: 15     Lead Monitored: Lead II Rhythm: normal sinus rhythm       VTE Required Core Measure: Pharmacological prophylaxis initiated/maintained Last Bowel Movement: 10/10/23  Diet Cardiac     Ric Score: 20  Fall Risk Score: 7  Accucheck []   Freq?      Lines/Drains/Airways       Peripheral Intravenous Line  Duration                  Peripheral IV - Single Lumen 10/10/23 1817 20 G Anterior;Right Forearm <1 day                        Problem: Adult Inpatient Plan of Care  Goal: Plan of Care Review  Outcome: Ongoing, Progressing  Goal: Patient-Specific Goal (Individualized)  Outcome: Ongoing, Progressing  Goal: Absence of Hospital-Acquired Illness or Injury  Outcome: Ongoing, Progressing  Goal: Optimal Comfort and Wellbeing  Outcome: Ongoing, Progressing  Goal: Readiness for Transition of Care  Outcome: Ongoing, Progressing     Problem: Adjustment to Illness (Sepsis/Septic Shock)  Goal: Optimal Coping  Outcome: Ongoing, Progressing     Problem: Bleeding (Sepsis/Septic Shock)  Goal: Absence of Bleeding  Outcome: Ongoing, Progressing     Problem: Glycemic Control Impaired (Sepsis/Septic Shock)  Goal:  Blood Glucose Level Within Desired Range  Outcome: Ongoing, Progressing     Problem: Infection Progression (Sepsis/Septic Shock)  Goal: Absence of Infection Signs and Symptoms  Outcome: Ongoing, Progressing     Problem: Nutrition Impaired (Sepsis/Septic Shock)  Goal: Optimal Nutrition Intake  Outcome: Ongoing, Progressing

## 2023-10-11 NOTE — HOSPITAL COURSE
10/11 admitted for meeting sirs criteria. Subjective fever, progressive dyspnea, continues to smoke and drink. Recent mi requiring stent placement. Continue intravenous antibiotic(s), schedule breathing treatments, echocardiogram pending. Incentive spirometer. Does not wear supplemental oxygen at baseline  10/12 continues with hypoxia but states increased work of breathing is patient's norm. States using albuterol nebs at home without a prescription. Does not seek medical care at baseline. Continue antibiotic(s) and add pulmicort. Echocardiogram without pericardial effusion. Eager to go home.  10/13 modest improvement in dyspnea. Repeat chest x-ray pending.   10/14 endorses improvement in symptoms of dyspnea. Leukocytosis persists. Repeat chest x-ray unchanged. Continue current regimen    10/15  No acute events overnight. Patient has weaned off supplemental oxygen. Patient completed 6 days of po and intravenous antibiotic(s). Prescription for neb and neb treatments. Pulmonology referral.    Leukocytosis trending down. On review of prior labs, leukocytosis is chronic consistent with smoking history and given clinical improvement with intravenous and po antibiotic(s) for copdx. Procalcitonin mildly elevated. Smoking cessation counseling 3-10 minutes. Offered nicotine transdermal and smoking cessation resources.    Patient previously admitted for stemi requiring pci and stent placement. During this admission, electrocardiography without st-t wave abnormalities. Echocardiogram without pericardial effusion to explain dyspnea. Advised to follow up with cardiology outpatient.     On exam, no acute distress. No respiratory distress, on room air. +anxiety. Lungs clear on auscultation bilateral, mild wheezing, good air movement.    Patient seen and evaluated by me. Patient was determined to be suitable for discharge. Patient deemed stable for discharge to home with nurse practitioner to visit home program to monitor  respiratory status and smoking cessation efforts.

## 2023-10-11 NOTE — PLAN OF CARE
[facility] - Telemetry (Hospital)  Initial Discharge Assessment       Primary Care Provider: No, Primary Doctor    Admission Diagnosis: Cough [R05.9]  Sepsis [A41.9]    Admission Date: 10/10/2023  Expected Discharge Date:     Transition of Care Barriers: None    Payor: MEDICAID / Plan: [facility] COMMUNITY PLAN [employer] [place] ([place] MEDICA[place] / Product Type: Managed Medicaid /     Extended Emergency Contact Information  Primary Emergency Contact: Donovan Hammond  Mobile Phone: [phone]  Relation: Brother    Discharge Plan A: Home         [facility]  [address] [place] [address] [address] [address]  Phone: [phone] Fax: [phone]    [id number] - [place] - [address] [facility] [address]  [address]  [place] [address]  Phone: [phone] Fax: [phone]      Initial Assessment (most recent)       Adult Discharge Assessment - 10/11/23 0955          Discharge Assessment    Assessment Type Discharge Planning Assessment     Confirmed/corrected address, phone number and insurance Yes     Confirmed Demographics Correct on Facesheet     Source of Information patient     When was your last doctors appointment? 09/12/23   Salud Hastings NP - Cardiology    Communicated PINKY with patient/caregiver Date not available/Unable to determine     Reason For Admission Sepsis     People in Home --   2 roommates    Facility Arrived From: home     Do you expect to return to your current living situation? Yes     Do you have help at home or someone to help you manage your care at home? Yes     Who are your caregiver(s) and their phone number(s)? friends/ family     Prior to hospitilization cognitive status: Alert/Oriented     Current cognitive status: Alert/Oriented     Walking or Climbing Stairs --   independent    Dressing/Bathing --   independent    Home Accessibility wheelchair accessible     Equipment Currently Used at Home nebulizer     Readmission  within 30 days? No     Patient currently being followed by outpatient case management? No     Do you currently have service(s) that help you manage your care at home? No     Do you take prescription medications? Yes     Do you have prescription coverage? Yes     Coverage [facility] Medicaid     Do you have any problems affording any of your prescribed medications? No     Is the patient taking medications as prescribed? yes     Who is going to help you get home at discharge? friends/ family     How do you get to doctors appointments? car, drives self     Are you on dialysis? No     Do you take coumadin? No     DME Needed Upon Discharge  none     Discharge Plan discussed with: Patient     Transition of Care Barriers None     Discharge Plan A Home                   Anticipated DC dispo: home  Prior Level of Function: independent with ADLs  People in home: 2 roommates    Comments:  SW met with patient at bedside to introduce role and discuss discharge planning. Patient's friends and family will be help at home and can provide transport at time of discharge. Confirmed demographics, insurance, and emergency contacts. SW updated Patient's whiteboard with contact information and anticipated DC plan. CM discharge needs depends on hospital progress. SW will continue following to assist with other needs.

## 2023-10-11 NOTE — SUBJECTIVE & OBJECTIVE
Interval History: See hospital course for today      Review of Systems   Constitutional:  Positive for activity change, appetite change, fatigue and fever.   HENT:  Positive for congestion, postnasal drip and sore throat.    Respiratory:  Positive for shortness of breath.    Cardiovascular:  Negative for chest pain and leg swelling.   Gastrointestinal:  Positive for nausea. Negative for abdominal pain and vomiting.   Skin:  Negative for wound.   Neurological:  Positive for weakness.   Psychiatric/Behavioral:  Negative for agitation, behavioral problems, confusion, decreased concentration and dysphoric mood. The patient is not nervous/anxious.      Objective:     Vital Signs (Most Recent):  Temp: 98 °F (36.7 °C) (10/11/23 0744)  Pulse: 82 (10/11/23 0748)  Resp: 16 (10/11/23 0748)  BP: 132/84 (10/11/23 0744)  SpO2: 97 % (10/11/23 0748) Vital Signs (24h Range):  Temp:  [97.8 °F (36.6 °C)-98.9 °F (37.2 °C)] 98 °F (36.7 °C)  Pulse:  [64-98] 82  Resp:  [16-24] 16  SpO2:  [91 %-97 %] 97 %  BP: (132-162)/(69-87) 132/84     Weight: 86.4 kg (190 lb 7.6 oz)  Body mass index is 27.33 kg/m².    Intake/Output Summary (Last 24 hours) at 10/11/2023 0817  Last data filed at 10/11/2023 0812  Gross per 24 hour   Intake 240 ml   Output 1350 ml   Net -1110 ml         Physical Exam  Vitals and nursing note reviewed.   Constitutional:       General: He is not in acute distress.     Appearance: He is overweight. He is ill-appearing. He is not toxic-appearing.      Interventions: Nasal cannula in place.   HENT:      Head: Normocephalic and atraumatic.   Cardiovascular:      Rate and Rhythm: Normal rate.   Pulmonary:      Effort: Pulmonary effort is normal. Tachypnea present. No respiratory distress.      Breath sounds: Decreased air movement present. Wheezing present.   Abdominal:      Palpations: Abdomen is soft.      Tenderness: There is no abdominal tenderness.   Musculoskeletal:      Right lower leg: No edema.      Left lower leg: No  edema.        Legs:    Skin:     General: Skin is warm.      Capillary Refill: Capillary refill takes less than 2 seconds.      Comments: Facial flushing   Neurological:      Mental Status: He is alert and oriented to person, place, and time.      Motor: Weakness present.   Psychiatric:         Mood and Affect: Mood is anxious.         Speech: Speech normal.         Behavior: Behavior is cooperative.             Significant Labs: All pertinent labs within the past 24 hours have been reviewed.  Blood Culture:   Recent Labs   Lab 10/10/23  1645 10/10/23  1701   LABBLOO No Growth to date No Growth to date     CBC:   Recent Labs   Lab 10/10/23  1543 10/11/23  0602   WBC 16.11* 15.79*   HGB 15.2 15.7   HCT 46.7 48.0    311     CMP:   Recent Labs   Lab 10/10/23  1543 10/11/23  0602    141   K 3.9 4.5    100   CO2 24 28   * 135*   BUN 8 10   CREATININE 1.0 0.9   CALCIUM 10.0 10.2   PROT 8.7* 8.9*   ALBUMIN 3.7 3.5   BILITOT 0.4 0.3   ALKPHOS 109 114   AST 30 37   ALT 69* 83*   ANIONGAP 14 13     Lactic Acid:   Recent Labs   Lab 10/10/23  1645 10/10/23  2117   LACTATE 1.3 3.2*     Troponin:   Recent Labs   Lab 10/10/23  1543   TROPONINI <0.006       Significant Imaging: I have reviewed all pertinent imaging results/findings within the past 24 hours.  CT: I have reviewed all pertinent results/findings within the past 24 hours and my personal findings are:  negative for pulmonary embolism   CXR: I have reviewed all pertinent results/findings within the past 24 hours and my personal findings are:  no acute process

## 2023-10-11 NOTE — ASSESSMENT & PLAN NOTE
Patient with known CAD s/p stent placement, which is controlled Will continue brilinta, ASA and Statin and monitor for S/Sx of angina/ACS. Continue to monitor on telemetry.

## 2023-10-11 NOTE — ASSESSMENT & PLAN NOTE
This patient does have evidence of infective focus  My overall impression is sepsis.  Source: Respiratory  Antibiotics given-   Antibiotics (72h ago, onward)    Start     Stop Route Frequency Ordered    10/11/23 1900  cefTRIAXone (ROCEPHIN) 1 g in dextrose 5 % in water (D5W) 100 mL IVPB (MB+)         -- IV Every 24 hours (non-standard times) 10/10/23 2043    10/11/23 0800  doxycycline (VIBRAMYCIN) 100 mg in dextrose 5 % in water (D5W) 100 mL IVPB (MB+)         -- IV Every 12 hours (non-standard times) 10/10/23 2043    10/10/23 1745  doxycycline (VIBRAMYCIN) 100 mg in dextrose 5 % in water (D5W) 100 mL IVPB (MB+)         10/11/23 0544 IV ED 1 Time 10/10/23 1734        Latest lactate reviewed-  Recent Labs   Lab 10/10/23  1645   LACTATE 1.3     Organ dysfunction indicated by none    Fluid challenge Not needed - patient is not hypotensive      Post- resuscitation assessment No - Post resuscitation assessment not needed       Will Not start Pressors- Levophed for MAP of 65  Source control achieved by: ABx, steroids, duonebs

## 2023-10-11 NOTE — HPI
Sawyer Hammond is a 48 y.o. male with a PMH  has a past medical history of Coronary artery disease involving native coronary artery of native heart with unstable angina pectoris (8/28/2023), ST elevation myocardial infarction involving right coronary artery (8/28/2023), and Tobacco use (8/28/2023).  Presented to the ER for evaluation of generalized weakness and shortness of breath progressively gotten worse since Thursday (10/5/23) patient reports his symptoms initially started with itchy throat and has progressed to fever and other flu-like symptoms.  Denies any improvement with OTC meds.  Denies recent illnesses or sick contacts.  Denies any other symptoms at this time.  ER workup revealed leukocytosis of 16.11,  mg/dL, CPK of 264, procalcitonin level of 0.39 with a CTA of the chest revealed multifocal pneumonia.  Lactic acid, troponin, BNP, flu/COVID, and chest x-ray were all negative for acute findings.  EKG revealed normal sinus rhythm with a ventricular rate of 83 beats per minute with a QT/QTC of 352/413.  Blood cultures obtained x2.  Patient received 2 albuterol breathing treatments, DuoNeb, 1 g Rocephin, 4 mg of Decadron, and 100 mg doxycycline in ED. hospital Medicine consulted to admit patient for sepsis/multifocal pneumonia.  Patient in agreement with treatment plan.  Patient will be admitted under inpatient status.    PCP: No, Primary Doctor

## 2023-10-11 NOTE — H&P
[facility] Medicine  History & Physical    Patient Name: Sawyer Hammond  MRN: [id number]  Patient Class: IP- Inpatient  Admission Date: 10/10/2023  Attending Physician: Freddie Santiago MD   Primary Care Provider: Melva Primary Doctor         Patient information was obtained from patient, past medical records and ER records.     Subjective:     Principal Problem:Sepsis    Chief Complaint:   Chief Complaint   Patient presents with    Shortness of Breath     Pt states that he started having SOB on Thursday hx of MI x 2 months ago VSS Pt AAOX4         HPI: Sawyer Hammond is a 48 y.o. male with a PMH  has a past medical history of Coronary artery disease involving native coronary artery of native heart with unstable angina pectoris (8/28/2023), ST elevation myocardial infarction involving right coronary artery (8/28/2023), and Tobacco use (8/28/2023).  Presented to the ER for evaluation of generalized weakness and shortness of breath progressively gotten worse since Thursday (10/5/23) patient reports his symptoms initially started with itchy throat and has progressed to fever and other flu-like symptoms.  Denies any improvement with OTC meds.  Denies recent illnesses or sick contacts.  Denies any other symptoms at this time.  ER workup revealed leukocytosis of 16.11,  mg/dL, CPK of 264, procalcitonin level of 0.39 with a CTA of the chest revealed multifocal pneumonia.  Lactic acid, troponin, BNP, flu/COVID, and chest x-ray were all negative for acute findings.  EKG revealed normal sinus rhythm with a ventricular rate of 83 beats per minute with a QT/QTC of 352/413.  Blood cultures obtained x2.  Patient received 2 albuterol breathing treatments, DuoNeb, 1 g Rocephin, 4 mg of Decadron, and 100 mg doxycycline in ED. hospital Medicine consulted to admit patient for sepsis/multifocal pneumonia.  Patient in agreement with treatment plan.  Patient will be admitted under inpatient  status.    PCP: No, Primary Doctor        Past Medical History:   Diagnosis Date    Coronary artery disease involving native coronary artery of native heart with unstable angina pectoris 8/28/2023    ST elevation myocardial infarction involving right coronary artery 8/28/2023    Tobacco use 8/28/2023       Past Surgical History:   Procedure Laterality Date    LEFT HEART CATHETERIZATION Left 8/28/2023    Procedure: Left heart cath;  Surgeon: Leola Dodson MD;  Location: [facility] CATH LAB;  Service: Cardiology;  Laterality: Left;    PERCUTANEOUS CORONARY INTERVENTION, ARTERY N/A 8/28/2023    Procedure: Percutaneous coronary intervention;  Surgeon: Leola Dodson MD;  Location: [facility] CATH LAB;  Service: Cardiology;  Laterality: N/A;    STENT, DRUG ELUTING, SINGLE VESSEL, CORONARY  8/28/2023    Procedure: Stent, Drug Eluting, Single Vessel, Coronary;  Surgeon: Leola Dodson MD;  Location: [facility] CATH LAB;  Service: Cardiology;;    THROMBECTOMY, CORONARY  8/28/2023    Procedure: Thrombectomy, Coronary;  Surgeon: Leola Dodson MD;  Location: [facility] CATH LAB;  Service: Cardiology;;  Using aspiration catheter       Review of patient's allergies indicates:  No Known Allergies    No current facility-administered medications on file prior to encounter.     Current Outpatient Medications on File Prior to Encounter   Medication Sig    aspirin (ECOTRIN) 81 MG EC tablet Take 1 tablet (81 mg total) by mouth once daily.    atorvastatin (LIPITOR) 80 MG tablet Take 1 tablet (80 mg total) by mouth once daily.    losartan (COZAAR) 25 MG tablet Take 1 tablet (25 mg total) by mouth once daily.    metoprolol tartrate (LOPRESSOR) 50 MG tablet Take 1 tablet (50 mg total) by mouth 2 (two) times daily.    ticagrelor (BRILINTA) 90 mg tablet Take 1 tablet (90 mg total) by mouth 2 (two) times daily.     Family History       Problem Relation (Age of Onset)    No Known Problems Mother, Father          Tobacco Use    Smoking status:  Never    Smokeless tobacco: Never   Substance and Sexual Activity    Alcohol use: Not on file     Comment: occasional    Drug use: Never    Sexual activity: Not on file     Review of Systems   Constitutional:  Positive for chills, diaphoresis, fatigue and fever.   Respiratory:  Positive for cough and shortness of breath.    Cardiovascular:  Negative for chest pain, palpitations and leg swelling.   All other systems reviewed and are negative.    Objective:     Vital Signs (Most Recent):  Temp: 97.8 °F (36.6 °C) (10/10/23 2125)  Pulse: 94 (10/10/23 2125)  Resp: 20 (10/10/23 2125)  BP: (!) 162/74 (10/10/23 2125)  SpO2: 95 % (10/10/23 2125) Vital Signs (24h Range):  Temp:  [97.8 °F (36.6 °C)-98.9 °F (37.2 °C)] 97.8 °F (36.6 °C)  Pulse:  [64-98] 94  Resp:  [18-24] 20  SpO2:  [91 %-96 %] 95 %  BP: (143-162)/(74-87) 162/74     Weight: 86.4 kg (190 lb 7.6 oz)  Body mass index is 27.33 kg/m².     Physical Exam  Vitals and nursing note reviewed.   Constitutional:       General: He is awake. He is not in acute distress.     Appearance: Normal appearance. He is well-developed and well-groomed. He is not ill-appearing, toxic-appearing or diaphoretic.   HENT:      Head: Normocephalic and atraumatic.   Eyes:      Extraocular Movements: Extraocular movements intact.      Conjunctiva/sclera: Conjunctivae normal.   Cardiovascular:      Rate and Rhythm: Normal rate and regular rhythm.      Pulses: Normal pulses.      Heart sounds: Normal heart sounds. No murmur heard.  Pulmonary:      Effort: Pulmonary effort is normal.      Breath sounds: Decreased breath sounds and rhonchi present. No wheezing or rales.   Abdominal:      General: Bowel sounds are normal.      Palpations: Abdomen is soft.      Tenderness: There is no abdominal tenderness.   Musculoskeletal:      Cervical back: Normal range of motion and neck supple.      Comments: 5/5 strength throughout   Skin:     General: Skin is warm and dry.      Capillary Refill: Capillary  refill takes less than 2 seconds.   Neurological:      General: No focal deficit present.      Mental Status: He is alert and oriented to person, place, and time. Mental status is at baseline.      GCS: GCS eye subscore is 4. GCS verbal subscore is 5. GCS motor subscore is 6.      Cranial Nerves: Cranial nerves 2-12 are intact.      Sensory: Sensation is intact.      Motor: Motor function is intact.      Coordination: Coordination is intact.   Psychiatric:         Mood and Affect: Mood normal.         Behavior: Behavior normal. Behavior is cooperative.              LABS:  Recent Results (from the past 24 hour(s))   CBC auto differential    Collection Time: 10/10/23  3:43 PM   Result Value Ref Range    WBC 16.11 (H) 3.90 - 12.70 K/uL    RBC 4.93 4.60 - 6.20 M/uL    Hemoglobin 15.2 14.0 - 18.0 g/dL    Hematocrit 46.7 40.0 - 54.0 %    MCV 95 82 - 98 fL    MCH 30.8 27.0 - 31.0 pg    MCHC 32.5 32.0 - 36.0 g/dL    RDW 13.4 11.5 - 14.5 %    Platelets 293 150 - 450 K/uL    MPV 9.3 9.2 - 12.9 fL    Immature Granulocytes 1.0 (H) 0.0 - 0.5 %    Gran # (ANC) 10.7 (H) 1.8 - 7.7 K/uL    Immature Grans (Abs) 0.16 (H) 0.00 - 0.04 K/uL    Lymph # 3.0 1.0 - 4.8 K/uL    Mono # 1.9 (H) 0.3 - 1.0 K/uL    Eos # 0.3 0.0 - 0.5 K/uL    Baso # 0.08 0.00 - 0.20 K/uL    nRBC 0 0 /100 WBC    Gran % 66.2 38.0 - 73.0 %    Lymph % 18.4 18.0 - 48.0 %    Mono % 12.0 4.0 - 15.0 %    Eosinophil % 1.9 0.0 - 8.0 %    Basophil % 0.5 0.0 - 1.9 %    Differential Method Automated    Comprehensive metabolic panel    Collection Time: 10/10/23  3:43 PM   Result Value Ref Range    Sodium 139 136 - 145 mmol/L    Potassium 3.9 3.5 - 5.1 mmol/L    Chloride 101 95 - 110 mmol/L    CO2 24 23 - 29 mmol/L    Glucose 145 (H) 70 - 110 mg/dL    BUN 8 6 - 20 mg/dL    Creatinine 1.0 0.5 - 1.4 mg/dL    Calcium 10.0 8.7 - 10.5 mg/dL    Total Protein 8.7 (H) 6.0 - 8.4 g/dL    Albumin 3.7 3.5 - 5.2 g/dL    Total Bilirubin 0.4 0.1 - 1.0 mg/dL    Alkaline Phosphatase 109 55 - 135  U/L    AST 30 10 - 40 U/L    ALT 69 (H) 10 - 44 U/L    eGFR >60 >60 mL/min/1.73 m^2    Anion Gap 14 8 - 16 mmol/L   CPK    Collection Time: 10/10/23  3:43 PM   Result Value Ref Range     (H) 20 - 200 U/L   Troponin I    Collection Time: 10/10/23  3:43 PM   Result Value Ref Range    Troponin I <0.006 0.000 - 0.026 ng/mL   Brain natriuretic peptide    Collection Time: 10/10/23  3:43 PM   Result Value Ref Range    BNP 26 0 - 99 pg/mL   Lactic acid, plasma #1    Collection Time: 10/10/23  4:45 PM   Result Value Ref Range    Lactate (Lactic Acid) 1.3 0.5 - 2.2 mmol/L   Magnesium    Collection Time: 10/10/23  4:45 PM   Result Value Ref Range    Magnesium 2.2 1.6 - 2.6 mg/dL   Procalcitonin    Collection Time: 10/10/23  4:45 PM   Result Value Ref Range    Procalcitonin 0.39 (H) <0.25 ng/mL   Influenza A & B by Molecular    Collection Time: 10/10/23  4:56 PM    Specimen: Nasopharyngeal Swab   Result Value Ref Range    Influenza A, Molecular Negative Negative    Influenza B, Molecular Negative Negative    Flu A & B Source Nasal swab    COVID-19 Rapid Screening    Collection Time: 10/10/23  4:56 PM   Result Value Ref Range    SARS-CoV-2 RNA, Amplification, Qual Negative Negative   Lactic acid, plasma #2    Collection Time: 10/10/23  9:17 PM   Result Value Ref Range    Lactate (Lactic Acid) 3.2 (H) 0.5 - 2.2 mmol/L       RADIOLOGY  CTA Chest Non-Coronary (PE Studies)    Result Date: 10/10/2023  EXAMINATION: CTA CHEST NON CORONARY (PE STUDIES) CLINICAL HISTORY: Pulmonary embolism (PE) suspected, unknown D-dimer; TECHNIQUE: Low dose axial images, sagittal and coronal reformations were obtained from the thoracic inlet to the lung bases following the IV administration of 100 mL of Omnipaque 350.  Contrast timing was optimized to evaluate the pulmonary arteries.  MIP images were performed. COMPARISON: None FINDINGS: Mixing artifacts noted in the bilateral lower lobe pulmonary are arteries.  Suboptimal bolus achieved.  Tree  in bud opacities throughout the lung suggestive of a endobronchial process possibly infectious.  Subpleural ground-glass and reticular opacities in the left upper lobe right upper lobe and basilar segment mild motion artifacts.     Suboptimal bolus with mixing artifacts in the lower lobes.  Limited study as such.  However no pulmonary embolism.  No dissection. Mild motion artifacts are noted.  However there is suggestion tree in bud opacities throughout the lung with focal areas of see patchy ground-glass opacities.  Correlate clinically for endobronchial infectious process. All CT scans   are performed using dose optimization techniques including the following: automated exposure control; adjustment of the mA and/or kV; use of iterative reconstruction technique.  Dose modulation was employed for ALARA by means of: Automated exposure control; adjustment of the mA and/or kV according to patient size (this includes techniques or standardized protocols for targeted exams where dose is matched to indication/reason for exam; i.e. extremities or head); and/or use of iterative reconstructive technique. Electronically signed by: Jean Claude Marte Date:    10/10/2023 Time:    18:56    X-Ray Chest 1 View    Result Date: 10/10/2023  EXAMINATION: XR CHEST 1 VIEW CLINICAL HISTORY: Cough, unspecified FINDINGS: Single view of the chest.  Comparison 08/28/2023 Cardiac silhouette is normal.  The lungs demonstrate no evidence of active disease.  No evidence of pleural effusion or pneumothorax.  Bones appear intact.     No acute process seen. Electronically signed by: Gibran Bey MD Date:    10/10/2023 Time:    15:28      EKG    MICROBIOLOGY    MDM     Amount and/or Complexity of Data Reviewed  Clinical lab tests: reviewed  Tests in the radiology section of CPT®: reviewed  Tests in the medicine section of CPT®: reviewed  Discussion of test results with the performing providers: yes  Decide to obtain previous medical records or to obtain  history from someone other than the patient: yes  Obtain history from someone other than the patient: yes  Review and summarize past medical records: yes  Discuss the patient with other providers: yes  Independent visualization of images, tracings, or specimens: yes          Assessment/Plan:     * Sepsis  This patient does have evidence of infective focus  My overall impression is sepsis.  Source: Respiratory  Antibiotics given-   Antibiotics (72h ago, onward)    Start     Stop Route Frequency Ordered    10/11/23 1900  cefTRIAXone (ROCEPHIN) 1 g in dextrose 5 % in water (D5W) 100 mL IVPB (MB+)         -- IV Every 24 hours (non-standard times) 10/10/23 2043    10/11/23 0800  doxycycline (VIBRAMYCIN) 100 mg in dextrose 5 % in water (D5W) 100 mL IVPB (MB+)         -- IV Every 12 hours (non-standard times) 10/10/23 2043    10/10/23 1745  doxycycline (VIBRAMYCIN) 100 mg in dextrose 5 % in water (D5W) 100 mL IVPB (MB+)         10/11/23 0544 IV ED 1 Time 10/10/23 1734        Latest lactate reviewed-  Recent Labs   Lab 10/10/23  1645   LACTATE 1.3     Organ dysfunction indicated by none    Fluid challenge Not needed - patient is not hypotensive      Post- resuscitation assessment No - Post resuscitation assessment not needed       Will Not start Pressors- Levophed for MAP of 65  Source control achieved by: ABx, steroids, duonebs    Coronary artery disease involving native coronary artery of native heart with unstable angina pectoris  Patient with known CAD s/p stent placement, which is controlled Will continue brilinta, ASA and Statin and monitor for S/Sx of angina/ACS. Continue to monitor on telemetry.           VTE Risk Mitigation (From admission, onward)         Ordered     enoxaparin injection 40 mg  Daily         10/10/23 2043     Place RITO hose  Until discontinued         10/10/23 2043     IP VTE LOW RISK PATIENT  Once         10/10/23 2043              //Core Measures   -DVT proph: SCDs, lovenox  -Code status Full     -Surrogate:none      Components of this note were documented using a voice recognition system and are subject to errors not corrected at the time the document was proof read. Please contact the author for any clarifications.     Jewel Santiago NP  Department of Hospital Medicine  '[place] - Telemetry ([facility])

## 2023-10-11 NOTE — SUBJECTIVE & OBJECTIVE
Past Medical History:   Diagnosis Date    Coronary artery disease involving native coronary artery of native heart with unstable angina pectoris 8/28/2023    ST elevation myocardial infarction involving right coronary artery 8/28/2023    Tobacco use 8/28/2023       Past Surgical History:   Procedure Laterality Date    LEFT HEART CATHETERIZATION Left 8/28/2023    Procedure: Left heart cath;  Surgeon: Leola Dodson MD;  Location: [facility] CATH LAB;  Service: Cardiology;  Laterality: Left;    PERCUTANEOUS CORONARY INTERVENTION, ARTERY N/A 8/28/2023    Procedure: Percutaneous coronary intervention;  Surgeon: Leola Dodson MD;  Location: [facility] CATH LAB;  Service: Cardiology;  Laterality: N/A;    STENT, DRUG ELUTING, SINGLE VESSEL, CORONARY  8/28/2023    Procedure: Stent, Drug Eluting, Single Vessel, Coronary;  Surgeon: Leola Dodson MD;  Location: [facility] CATH LAB;  Service: Cardiology;;    THROMBECTOMY, CORONARY  8/28/2023    Procedure: Thrombectomy, Coronary;  Surgeon: Leola Dodson MD;  Location: [facility] CATH LAB;  Service: Cardiology;;  Using aspiration catheter       Review of patient's allergies indicates:  No Known Allergies    No current facility-administered medications on file prior to encounter.     Current Outpatient Medications on File Prior to Encounter   Medication Sig    aspirin (ECOTRIN) 81 MG EC tablet Take 1 tablet (81 mg total) by mouth once daily.    atorvastatin (LIPITOR) 80 MG tablet Take 1 tablet (80 mg total) by mouth once daily.    losartan (COZAAR) 25 MG tablet Take 1 tablet (25 mg total) by mouth once daily.    metoprolol tartrate (LOPRESSOR) 50 MG tablet Take 1 tablet (50 mg total) by mouth 2 (two) times daily.    ticagrelor (BRILINTA) 90 mg tablet Take 1 tablet (90 mg total) by mouth 2 (two) times daily.     Family History       Problem Relation (Age of Onset)    No Known Problems Mother, Father          Tobacco Use    Smoking status: Never    Smokeless tobacco: Never   Substance and Sexual  Activity    Alcohol use: Not on file     Comment: occasional    Drug use: Never    Sexual activity: Not on file     Review of Systems   Constitutional:  Positive for chills, diaphoresis, fatigue and fever.   Respiratory:  Positive for cough and shortness of breath.    Cardiovascular:  Negative for chest pain, palpitations and leg swelling.   All other systems reviewed and are negative.    Objective:     Vital Signs (Most Recent):  Temp: 97.8 °F (36.6 °C) (10/10/23 2125)  Pulse: 94 (10/10/23 2125)  Resp: 20 (10/10/23 2125)  BP: (!) 162/74 (10/10/23 2125)  SpO2: 95 % (10/10/23 2125) Vital Signs (24h Range):  Temp:  [97.8 °F (36.6 °C)-98.9 °F (37.2 °C)] 97.8 °F (36.6 °C)  Pulse:  [64-98] 94  Resp:  [18-24] 20  SpO2:  [91 %-96 %] 95 %  BP: (143-162)/(74-87) 162/74     Weight: 86.4 kg (190 lb 7.6 oz)  Body mass index is 27.33 kg/m².     Physical Exam  Vitals and nursing note reviewed.   Constitutional:       General: He is awake. He is not in acute distress.     Appearance: Normal appearance. He is well-developed and well-groomed. He is not ill-appearing, toxic-appearing or diaphoretic.   HENT:      Head: Normocephalic and atraumatic.   Eyes:      Extraocular Movements: Extraocular movements intact.      Conjunctiva/sclera: Conjunctivae normal.   Cardiovascular:      Rate and Rhythm: Normal rate and regular rhythm.      Pulses: Normal pulses.      Heart sounds: Normal heart sounds. No murmur heard.  Pulmonary:      Effort: Pulmonary effort is normal.      Breath sounds: Decreased breath sounds and rhonchi present. No wheezing or rales.   Abdominal:      General: Bowel sounds are normal.      Palpations: Abdomen is soft.      Tenderness: There is no abdominal tenderness.   Musculoskeletal:      Cervical back: Normal range of motion and neck supple.      Comments: 5/5 strength throughout   Skin:     General: Skin is warm and dry.      Capillary Refill: Capillary refill takes less than 2 seconds.   Neurological:       General: No focal deficit present.      Mental Status: He is alert and oriented to person, place, and time. Mental status is at baseline.      GCS: GCS eye subscore is 4. GCS verbal subscore is 5. GCS motor subscore is 6.      Cranial Nerves: Cranial nerves 2-12 are intact.      Sensory: Sensation is intact.      Motor: Motor function is intact.      Coordination: Coordination is intact.   Psychiatric:         Mood and Affect: Mood normal.         Behavior: Behavior normal. Behavior is cooperative.              LABS:  Recent Results (from the past 24 hour(s))   CBC auto differential    Collection Time: 10/10/23  3:43 PM   Result Value Ref Range    WBC 16.11 (H) 3.90 - 12.70 K/uL    RBC 4.93 4.60 - 6.20 M/uL    Hemoglobin 15.2 14.0 - 18.0 g/dL    Hematocrit 46.7 40.0 - 54.0 %    MCV 95 82 - 98 fL    MCH 30.8 27.0 - 31.0 pg    MCHC 32.5 32.0 - 36.0 g/dL    RDW 13.4 11.5 - 14.5 %    Platelets 293 150 - 450 K/uL    MPV 9.3 9.2 - 12.9 fL    Immature Granulocytes 1.0 (H) 0.0 - 0.5 %    Gran # (ANC) 10.7 (H) 1.8 - 7.7 K/uL    Immature Grans (Abs) 0.16 (H) 0.00 - 0.04 K/uL    Lymph # 3.0 1.0 - 4.8 K/uL    Mono # 1.9 (H) 0.3 - 1.0 K/uL    Eos # 0.3 0.0 - 0.5 K/uL    Baso # 0.08 0.00 - 0.20 K/uL    nRBC 0 0 /100 WBC    Gran % 66.2 38.0 - 73.0 %    Lymph % 18.4 18.0 - 48.0 %    Mono % 12.0 4.0 - 15.0 %    Eosinophil % 1.9 0.0 - 8.0 %    Basophil % 0.5 0.0 - 1.9 %    Differential Method Automated    Comprehensive metabolic panel    Collection Time: 10/10/23  3:43 PM   Result Value Ref Range    Sodium 139 136 - 145 mmol/L    Potassium 3.9 3.5 - 5.1 mmol/L    Chloride 101 95 - 110 mmol/L    CO2 24 23 - 29 mmol/L    Glucose 145 (H) 70 - 110 mg/dL    BUN 8 6 - 20 mg/dL    Creatinine 1.0 0.5 - 1.4 mg/dL    Calcium 10.0 8.7 - 10.5 mg/dL    Total Protein 8.7 (H) 6.0 - 8.4 g/dL    Albumin 3.7 3.5 - 5.2 g/dL    Total Bilirubin 0.4 0.1 - 1.0 mg/dL    Alkaline Phosphatase 109 55 - 135 U/L    AST 30 10 - 40 U/L    ALT 69 (H) 10 - 44 U/L     eGFR >60 >60 mL/min/1.73 m^2    Anion Gap 14 8 - 16 mmol/L   CPK    Collection Time: 10/10/23  3:43 PM   Result Value Ref Range     (H) 20 - 200 U/L   Troponin I    Collection Time: 10/10/23  3:43 PM   Result Value Ref Range    Troponin I <0.006 0.000 - 0.026 ng/mL   Brain natriuretic peptide    Collection Time: 10/10/23  3:43 PM   Result Value Ref Range    BNP 26 0 - 99 pg/mL   Lactic acid, plasma #1    Collection Time: 10/10/23  4:45 PM   Result Value Ref Range    Lactate (Lactic Acid) 1.3 0.5 - 2.2 mmol/L   Magnesium    Collection Time: 10/10/23  4:45 PM   Result Value Ref Range    Magnesium 2.2 1.6 - 2.6 mg/dL   Procalcitonin    Collection Time: 10/10/23  4:45 PM   Result Value Ref Range    Procalcitonin 0.39 (H) <0.25 ng/mL   Influenza A & B by Molecular    Collection Time: 10/10/23  4:56 PM    Specimen: Nasopharyngeal Swab   Result Value Ref Range    Influenza A, Molecular Negative Negative    Influenza B, Molecular Negative Negative    Flu A & B Source Nasal swab    COVID-19 Rapid Screening    Collection Time: 10/10/23  4:56 PM   Result Value Ref Range    SARS-CoV-2 RNA, Amplification, Qual Negative Negative   Lactic acid, plasma #2    Collection Time: 10/10/23  9:17 PM   Result Value Ref Range    Lactate (Lactic Acid) 3.2 (H) 0.5 - 2.2 mmol/L       RADIOLOGY  CTA Chest Non-Coronary (PE Studies)    Result Date: 10/10/2023  EXAMINATION: CTA CHEST NON CORONARY (PE STUDIES) CLINICAL HISTORY: Pulmonary embolism (PE) suspected, unknown D-dimer; TECHNIQUE: Low dose axial images, sagittal and coronal reformations were obtained from the thoracic inlet to the lung bases following the IV administration of 100 mL of Omnipaque 350.  Contrast timing was optimized to evaluate the pulmonary arteries.  MIP images were performed. COMPARISON: None FINDINGS: Mixing artifacts noted in the bilateral lower lobe pulmonary are arteries.  Suboptimal bolus achieved.  Tree in bud opacities throughout the lung suggestive of a  endobronchial process possibly infectious.  Subpleural ground-glass and reticular opacities in the left upper lobe right upper lobe and basilar segment mild motion artifacts.     Suboptimal bolus with mixing artifacts in the lower lobes.  Limited study as such.  However no pulmonary embolism.  No dissection. Mild motion artifacts are noted.  However there is suggestion tree in bud opacities throughout the lung with focal areas of see patchy ground-glass opacities.  Correlate clinically for endobronchial infectious process. All CT scans   are performed using dose optimization techniques including the following: automated exposure control; adjustment of the mA and/or kV; use of iterative reconstruction technique.  Dose modulation was employed for ALARA by means of: Automated exposure control; adjustment of the mA and/or kV according to patient size (this includes techniques or standardized protocols for targeted exams where dose is matched to indication/reason for exam; i.e. extremities or head); and/or use of iterative reconstructive technique. Electronically signed by: Jean Claude Marte Date:    10/10/2023 Time:    18:56    X-Ray Chest 1 View    Result Date: 10/10/2023  EXAMINATION: XR CHEST 1 VIEW CLINICAL HISTORY: Cough, unspecified FINDINGS: Single view of the chest.  Comparison 08/28/2023 Cardiac silhouette is normal.  The lungs demonstrate no evidence of active disease.  No evidence of pleural effusion or pneumothorax.  Bones appear intact.     No acute process seen. Electronically signed by: Gibran Bey MD Date:    10/10/2023 Time:    15:28      EKG    MICROBIOLOGY    MDM     Amount and/or Complexity of Data Reviewed  Clinical lab tests: reviewed  Tests in the radiology section of CPT®: reviewed  Tests in the medicine section of CPT®: reviewed  Discussion of test results with the performing providers: yes  Decide to obtain previous medical records or to obtain history from someone other than the patient:  yes  Obtain history from someone other than the patient: yes  Review and summarize past medical records: yes  Discuss the patient with other providers: yes  Independent visualization of images, tracings, or specimens: yes

## 2023-10-12 PROBLEM — I50.31 ACUTE DIASTOLIC HEART FAILURE: Status: ACTIVE | Noted: 2023-10-12

## 2023-10-12 PROBLEM — J96.01 ACUTE HYPOXEMIC RESPIRATORY FAILURE: Status: ACTIVE | Noted: 2023-10-12

## 2023-10-12 PROBLEM — I50.33 ACUTE ON CHRONIC DIASTOLIC HEART FAILURE: Status: ACTIVE | Noted: 2023-10-12

## 2023-10-12 LAB
ALBUMIN SERPL BCP-MCNC: 3.4 G/DL (ref 3.5–5.2)
ALP SERPL-CCNC: 110 U/L (ref 55–135)
ALT SERPL W/O P-5'-P-CCNC: 101 U/L (ref 10–44)
ANION GAP SERPL CALC-SCNC: 14 MMOL/L (ref 8–16)
AST SERPL-CCNC: 46 U/L (ref 10–40)
BASOPHILS # BLD AUTO: 0.06 K/UL (ref 0–0.2)
BASOPHILS NFR BLD: 0.4 % (ref 0–1.9)
BILIRUB SERPL-MCNC: 0.4 MG/DL (ref 0.1–1)
BUN SERPL-MCNC: 20 MG/DL (ref 6–20)
CALCIUM SERPL-MCNC: 9.8 MG/DL (ref 8.7–10.5)
CHLORIDE SERPL-SCNC: 100 MMOL/L (ref 95–110)
CO2 SERPL-SCNC: 25 MMOL/L (ref 23–29)
CREAT SERPL-MCNC: 1 MG/DL (ref 0.5–1.4)
DIFFERENTIAL METHOD: ABNORMAL
EOSINOPHIL # BLD AUTO: 0.3 K/UL (ref 0–0.5)
EOSINOPHIL NFR BLD: 1.5 % (ref 0–8)
ERYTHROCYTE [DISTWIDTH] IN BLOOD BY AUTOMATED COUNT: 13.5 % (ref 11.5–14.5)
EST. GFR  (NO RACE VARIABLE): >60 ML/MIN/1.73 M^2
GLUCOSE SERPL-MCNC: 131 MG/DL (ref 70–110)
HCT VFR BLD AUTO: 48.8 % (ref 40–54)
HGB BLD-MCNC: 16 G/DL (ref 14–18)
IMM GRANULOCYTES # BLD AUTO: 0.59 K/UL (ref 0–0.04)
IMM GRANULOCYTES NFR BLD AUTO: 3.6 % (ref 0–0.5)
LYMPHOCYTES # BLD AUTO: 5.6 K/UL (ref 1–4.8)
LYMPHOCYTES NFR BLD: 34.2 % (ref 18–48)
MAGNESIUM SERPL-MCNC: 1.9 MG/DL (ref 1.6–2.6)
MCH RBC QN AUTO: 31.3 PG (ref 27–31)
MCHC RBC AUTO-ENTMCNC: 32.8 G/DL (ref 32–36)
MCV RBC AUTO: 95 FL (ref 82–98)
MONOCYTES # BLD AUTO: 1.7 K/UL (ref 0.3–1)
MONOCYTES NFR BLD: 10.2 % (ref 4–15)
NEUTROPHILS # BLD AUTO: 8.3 K/UL (ref 1.8–7.7)
NEUTROPHILS NFR BLD: 50.1 % (ref 38–73)
NRBC BLD-RTO: 0 /100 WBC
PHOSPHATE SERPL-MCNC: 5.2 MG/DL (ref 2.7–4.5)
PLATELET # BLD AUTO: 341 K/UL (ref 150–450)
PMV BLD AUTO: 9 FL (ref 9.2–12.9)
POTASSIUM SERPL-SCNC: 3.9 MMOL/L (ref 3.5–5.1)
PROT SERPL-MCNC: 8.1 G/DL (ref 6–8.4)
RBC # BLD AUTO: 5.12 M/UL (ref 4.6–6.2)
SODIUM SERPL-SCNC: 139 MMOL/L (ref 136–145)
WBC # BLD AUTO: 16.46 K/UL (ref 3.9–12.7)

## 2023-10-12 PROCEDURE — 85025 COMPLETE CBC W/AUTO DIFF WBC: CPT | Performed by: NURSE PRACTITIONER

## 2023-10-12 PROCEDURE — 27000646 HC AEROBIKA DEVICE

## 2023-10-12 PROCEDURE — 25000242 PHARM REV CODE 250 ALT 637 W/ HCPCS: Performed by: FAMILY MEDICINE

## 2023-10-12 PROCEDURE — 36415 COLL VENOUS BLD VENIPUNCTURE: CPT | Performed by: NURSE PRACTITIONER

## 2023-10-12 PROCEDURE — 84100 ASSAY OF PHOSPHORUS: CPT | Performed by: NURSE PRACTITIONER

## 2023-10-12 PROCEDURE — 94640 AIRWAY INHALATION TREATMENT: CPT

## 2023-10-12 PROCEDURE — 99900035 HC TECH TIME PER 15 MIN (STAT)

## 2023-10-12 PROCEDURE — 83735 ASSAY OF MAGNESIUM: CPT | Performed by: NURSE PRACTITIONER

## 2023-10-12 PROCEDURE — 25000003 PHARM REV CODE 250: Performed by: NURSE PRACTITIONER

## 2023-10-12 PROCEDURE — 80074 ACUTE HEPATITIS PANEL: CPT | Performed by: FAMILY MEDICINE

## 2023-10-12 PROCEDURE — 21400001 HC TELEMETRY ROOM

## 2023-10-12 PROCEDURE — 94799 UNLISTED PULMONARY SVC/PX: CPT

## 2023-10-12 PROCEDURE — 27000221 HC OXYGEN, UP TO 24 HOURS

## 2023-10-12 PROCEDURE — 36415 COLL VENOUS BLD VENIPUNCTURE: CPT | Performed by: FAMILY MEDICINE

## 2023-10-12 PROCEDURE — 94761 N-INVAS EAR/PLS OXIMETRY MLT: CPT

## 2023-10-12 PROCEDURE — 25000003 PHARM REV CODE 250: Performed by: HOSPITALIST

## 2023-10-12 PROCEDURE — 63600175 PHARM REV CODE 636 W HCPCS: Performed by: NURSE PRACTITIONER

## 2023-10-12 PROCEDURE — 80053 COMPREHEN METABOLIC PANEL: CPT | Performed by: NURSE PRACTITIONER

## 2023-10-12 PROCEDURE — 94664 DEMO&/EVAL PT USE INHALER: CPT

## 2023-10-12 RX ORDER — BUDESONIDE 0.5 MG/2ML
0.5 INHALANT ORAL EVERY 12 HOURS
Status: DISCONTINUED | OUTPATIENT
Start: 2023-10-12 | End: 2023-10-15 | Stop reason: HOSPADM

## 2023-10-12 RX ADMIN — ACETAMINOPHEN 650 MG: 325 TABLET ORAL at 11:10

## 2023-10-12 RX ADMIN — IPRATROPIUM BROMIDE AND ALBUTEROL SULFATE 3 ML: 2.5; .5 SOLUTION RESPIRATORY (INHALATION) at 01:10

## 2023-10-12 RX ADMIN — METOPROLOL TARTRATE 50 MG: 50 TABLET, FILM COATED ORAL at 08:10

## 2023-10-12 RX ADMIN — BUDESONIDE INHALATION 0.5 MG: 0.5 SUSPENSION RESPIRATORY (INHALATION) at 07:10

## 2023-10-12 RX ADMIN — CEFTRIAXONE 1 G: 1 INJECTION, POWDER, FOR SOLUTION INTRAMUSCULAR; INTRAVENOUS at 07:10

## 2023-10-12 RX ADMIN — LOSARTAN POTASSIUM 25 MG: 25 TABLET, FILM COATED ORAL at 09:10

## 2023-10-12 RX ADMIN — IPRATROPIUM BROMIDE AND ALBUTEROL SULFATE 3 ML: 2.5; .5 SOLUTION RESPIRATORY (INHALATION) at 07:10

## 2023-10-12 RX ADMIN — DOXYCYCLINE 100 MG: 100 INJECTION, POWDER, LYOPHILIZED, FOR SOLUTION INTRAVENOUS at 08:10

## 2023-10-12 RX ADMIN — TICAGRELOR 90 MG: 90 TABLET ORAL at 08:10

## 2023-10-12 RX ADMIN — ASPIRIN 81 MG: 81 TABLET, COATED ORAL at 09:10

## 2023-10-12 RX ADMIN — ATORVASTATIN CALCIUM 80 MG: 40 TABLET, FILM COATED ORAL at 09:10

## 2023-10-12 RX ADMIN — DOXYCYCLINE 100 MG: 100 INJECTION, POWDER, LYOPHILIZED, FOR SOLUTION INTRAVENOUS at 09:10

## 2023-10-12 RX ADMIN — GUAIFENESIN AND DEXTROMETHORPHAN 10 ML: 100; 10 SYRUP ORAL at 09:10

## 2023-10-12 RX ADMIN — TICAGRELOR 90 MG: 90 TABLET ORAL at 09:10

## 2023-10-12 RX ADMIN — ACETAMINOPHEN 650 MG: 325 TABLET ORAL at 09:10

## 2023-10-12 RX ADMIN — ENOXAPARIN SODIUM 40 MG: 40 INJECTION SUBCUTANEOUS at 05:10

## 2023-10-12 RX ADMIN — METOPROLOL TARTRATE 50 MG: 50 TABLET, FILM COATED ORAL at 09:10

## 2023-10-12 NOTE — ASSESSMENT & PLAN NOTE
This patient does have evidence of infective focus  My overall impression is sepsis.  Source: Respiratory  Antibiotics given-   Antibiotics (72h ago, onward)    Start     Stop Route Frequency Ordered    10/11/23 1900  cefTRIAXone (ROCEPHIN) 1 g in dextrose 5 % in water (D5W) 100 mL IVPB (MB+)         -- IV Every 24 hours (non-standard times) 10/10/23 2043    10/11/23 0800  doxycycline (VIBRAMYCIN) 100 mg in dextrose 5 % in water (D5W) 100 mL IVPB (MB+)         -- IV Every 12 hours (non-standard times) 10/10/23 2043        Latest lactate reviewed-  Recent Labs   Lab 10/10/23  1645 10/10/23  2117   LACTATE 1.3 3.2*     Organ dysfunction indicated by none    Fluid challenge Not needed - patient is not hypotensive      Post- resuscitation assessment No - Post resuscitation assessment not needed       Will Not start Pressors- Levophed for MAP of 65  Source control achieved by: ABx, steroids, duonebs

## 2023-10-12 NOTE — RESPIRATORY THERAPY
Home Oxygen Evaluation     1) Patient's O2 Sat on room air while at rest: 90%  If at rest Sat is 89% or above, continue.     2) Patient's O2 Sat on room air while exercisin%  If exercise Sat is 88% or below, continue.     3) Patient's O2 Sat while exercising on O2: 93% at 2 LPM  (Must show improvement from #2 for patients to qualify)     If exercise Sat on O2 shows improvement from #2, this patient qualifies for portable Oxygen. If not, the patient does not qualify.

## 2023-10-12 NOTE — PROGRESS NOTES
[facility] Medicine  Progress Note    Patient Name: Sawyer Hammond  MRN: [id number]  Patient Class: IP- Inpatient   Admission Date: 10/10/2023  Length of Stay: 2 days  Attending Physician: Laura Gardner MD  Primary Care Provider: Andrea Frye Doctor        Subjective:     Principal Problem:Sepsis        HPI:  Sawyer Hammond is a 48 y.o. male with a PMH  has a past medical history of Coronary artery disease involving native coronary artery of native heart with unstable angina pectoris (8/28/2023), ST elevation myocardial infarction involving right coronary artery (8/28/2023), and Tobacco use (8/28/2023).  Presented to the ER for evaluation of generalized weakness and shortness of breath progressively gotten worse since Thursday (10/5/23) patient reports his symptoms initially started with itchy throat and has progressed to fever and other flu-like symptoms.  Denies any improvement with OTC meds.  Denies recent illnesses or sick contacts.  Denies any other symptoms at this time.  ER workup revealed leukocytosis of 16.11,  mg/dL, CPK of 264, procalcitonin level of 0.39 with a CTA of the chest revealed multifocal pneumonia.  Lactic acid, troponin, BNP, flu/COVID, and chest x-ray were all negative for acute findings.  EKG revealed normal sinus rhythm with a ventricular rate of 83 beats per minute with a QT/QTC of 352/413.  Blood cultures obtained x2.  Patient received 2 albuterol breathing treatments, DuoNeb, 1 g Rocephin, 4 mg of Decadron, and 100 mg doxycycline in ED. hospital Medicine consulted to admit patient for sepsis/multifocal pneumonia.  Patient in agreement with treatment plan.  Patient will be admitted under inpatient status.    PCP: Melva Primary Doctor        Overview/Hospital Course:  10/11 admitted for meeting sirs criteria. Subjective fever, progressive dyspnea, continues to smoke and drink. Recent mi requiring stent placement. Continue intravenous  antibiotic(s), schedule breathing treatments, echocardiogram pending. Incentive spirometer. Does not wear supplemental oxygen at baseline      Interval History: See hospital course for today      Review of Systems   Constitutional:  Positive for activity change, appetite change, fatigue and fever.   HENT:  Positive for congestion, postnasal drip and sore throat.    Respiratory:  Positive for shortness of breath.    Cardiovascular:  Negative for chest pain and leg swelling.   Gastrointestinal:  Positive for nausea. Negative for abdominal pain and vomiting.   Skin:  Negative for wound.   Neurological:  Positive for weakness.   Psychiatric/Behavioral:  Negative for agitation, behavioral problems, confusion, decreased concentration and dysphoric mood. The patient is not nervous/anxious.      Objective:     Vital Signs (Most Recent):  Temp: 98 °F (36.7 °C) (10/11/23 0744)  Pulse: 82 (10/11/23 0748)  Resp: 16 (10/11/23 0748)  BP: 132/84 (10/11/23 0744)  SpO2: 97 % (10/11/23 0748) Vital Signs (24h Range):  Temp:  [97.8 °F (36.6 °C)-98.9 °F (37.2 °C)] 98 °F (36.7 °C)  Pulse:  [64-98] 82  Resp:  [16-24] 16  SpO2:  [91 %-97 %] 97 %  BP: (132-162)/(69-87) 132/84     Weight: 86.4 kg (190 lb 7.6 oz)  Body mass index is 27.33 kg/m².    Intake/Output Summary (Last 24 hours) at 10/11/2023 0817  Last data filed at 10/11/2023 0812  Gross per 24 hour   Intake 240 ml   Output 1350 ml   Net -1110 ml         Physical Exam  Vitals and nursing note reviewed.   Constitutional:       General: He is not in acute distress.     Appearance: He is overweight. He is ill-appearing. He is not toxic-appearing.      Interventions: Nasal cannula in place.   HENT:      Head: Normocephalic and atraumatic.   Cardiovascular:      Rate and Rhythm: Normal rate.   Pulmonary:      Effort: Pulmonary effort is normal. Tachypnea present. No respiratory distress.      Breath sounds: Decreased air movement present. Wheezing present.   Abdominal:      Palpations:  Abdomen is soft.      Tenderness: There is no abdominal tenderness.   Musculoskeletal:      Right lower leg: No edema.      Left lower leg: No edema.        Legs:    Skin:     General: Skin is warm.      Capillary Refill: Capillary refill takes less than 2 seconds.      Comments: Facial flushing   Neurological:      Mental Status: He is alert and oriented to person, place, and time.      Motor: Weakness present.   Psychiatric:         Mood and Affect: Mood is anxious.         Speech: Speech normal.         Behavior: Behavior is cooperative.             Significant Labs: All pertinent labs within the past 24 hours have been reviewed.  Blood Culture:   Recent Labs   Lab 10/10/23  1645 10/10/23  1701   LABBLOO No Growth to date No Growth to date     CBC:   Recent Labs   Lab 10/10/23  1543 10/11/23  0602   WBC 16.11* 15.79*   HGB 15.2 15.7   HCT 46.7 48.0    311     CMP:   Recent Labs   Lab 10/10/23  1543 10/11/23  0602    141   K 3.9 4.5    100   CO2 24 28   * 135*   BUN 8 10   CREATININE 1.0 0.9   CALCIUM 10.0 10.2   PROT 8.7* 8.9*   ALBUMIN 3.7 3.5   BILITOT 0.4 0.3   ALKPHOS 109 114   AST 30 37   ALT 69* 83*   ANIONGAP 14 13     Lactic Acid:   Recent Labs   Lab 10/10/23  1645 10/10/23  2117   LACTATE 1.3 3.2*     Troponin:   Recent Labs   Lab 10/10/23  1543   TROPONINI <0.006       Significant Imaging: I have reviewed all pertinent imaging results/findings within the past 24 hours.  CT: I have reviewed all pertinent results/findings within the past 24 hours and my personal findings are:  negative for pulmonary embolism   CXR: I have reviewed all pertinent results/findings within the past 24 hours and my personal findings are:  no acute process      Assessment/Plan:      * Sepsis  This patient does have evidence of infective focus  My overall impression is sepsis.  Source: Respiratory  Antibiotics given-   Antibiotics (72h ago, onward)    Start     Stop Route Frequency Ordered    10/11/23  1900  cefTRIAXone (ROCEPHIN) 1 g in dextrose 5 % in water (D5W) 100 mL IVPB (MB+)         -- IV Every 24 hours (non-standard times) 10/10/23 2043    10/11/23 0800  doxycycline (VIBRAMYCIN) 100 mg in dextrose 5 % in water (D5W) 100 mL IVPB (MB+)         -- IV Every 12 hours (non-standard times) 10/10/23 2043        Latest lactate reviewed-  Recent Labs   Lab 10/10/23  1645 10/10/23  2117   LACTATE 1.3 3.2*     Organ dysfunction indicated by none    Fluid challenge Not needed - patient is not hypotensive      Post- resuscitation assessment No - Post resuscitation assessment not needed       Will Not start Pressors- Levophed for MAP of 65  Source control achieved by: ABx, steroids, duonebs    Tobacco dependence  Dangers of cigarette smoking were reviewed with patient in detail. Patient was Counseled for 3-10 minutes. Nicotine replacement options were discussed. Nicotine replacement was discussed- not prescribed per patient's request    Coronary artery disease involving native coronary artery of native heart with unstable angina pectoris  Patient with known CAD s/p stent placement, which is controlled Will continue brilinta, ASA and Statin and monitor for S/Sx of angina/ACS. Continue to monitor on telemetry.           VTE Risk Mitigation (From admission, onward)         Ordered     enoxaparin injection 40 mg  Daily         10/10/23 2043     Place RITO hose  Until discontinued         10/10/23 2043     IP VTE LOW RISK PATIENT  Once         10/10/23 2043                Discharge Planning   PINKY:      Code Status: Full Code   Is the patient medically ready for discharge?:     Reason for patient still in hospital (select all that apply): Patient trending condition, Laboratory test, Treatment and Pending disposition  Discharge Plan A: Home                  Laura Gardner MD  Department of Hospital Medicine   [facility] - Telemetry ([facility])

## 2023-10-12 NOTE — ASSESSMENT & PLAN NOTE
This patient does have evidence of infective focus  My overall impression is sepsis.  Source: Respiratory  Antibiotics given-   Antibiotics (72h ago, onward)    Start     Stop Route Frequency Ordered    10/11/23 1900  cefTRIAXone (ROCEPHIN) 1 g in dextrose 5 % in water (D5W) 100 mL IVPB (MB+)         -- IV Every 24 hours (non-standard times) 10/10/23 2043    10/11/23 0800  doxycycline (VIBRAMYCIN) 100 mg in dextrose 5 % in water (D5W) 100 mL IVPB (MB+)         -- IV Every 12 hours (non-standard times) 10/10/23 2043        Latest lactate reviewed-  Recent Labs   Lab 10/10/23  1645 10/10/23  2117   LACTATE 1.3 3.2*     Organ dysfunction indicated by none    Fluid challenge Not needed - patient is not hypotensive      Post- resuscitation assessment No - Post resuscitation assessment not needed       Will Not start Pressors- Levophed for MAP of 65  Source control achieved by: ABx, duonebs  Added pulmicort and acapella

## 2023-10-12 NOTE — NURSING
In report I was informed that the patient was having facial flushing after an IV antibiotic but that the ED was unsure of which antibiotic was causing it. After administering doxycycline today the patient's redness/flushing seemed to have worsened. NP notified.

## 2023-10-12 NOTE — ASSESSMENT & PLAN NOTE
Patient with Hypoxic Respiratory failure which is Acute.  he is not on home oxygen. Supplemental oxygen was provided and noted- Oxygen Concentration (%):  [28] 28    .   Signs/symptoms of respiratory failure include- increased work of breathing and respiratory distress. Contributing diagnoses includes - COPD and Pneumonia Labs and images were reviewed. Patient Has not had a recent ABG. Will treat underlying causes and adjust management of respiratory failure as follows- consider abg if no improvement in hypoxia; echocardiogram reviewed without pericardial effusion but concentric remodeling; added acapella and pulmicort; sputum culture with rare bacteria, continue antibiotic(s) as procalcitonin mildly elevated, hosptialized 2 months ago for stemi and may need to broaden spectrum if no improvement

## 2023-10-12 NOTE — SUBJECTIVE & OBJECTIVE
Interval History: See hospital course for today      Review of Systems   Constitutional:  Positive for activity change. Negative for appetite change, fatigue and fever.   Respiratory:  Positive for shortness of breath.    Cardiovascular:  Negative for chest pain and leg swelling.   Gastrointestinal:  Negative for abdominal pain, nausea and vomiting.   Neurological:  Negative for weakness.   Psychiatric/Behavioral:  Negative for agitation, behavioral problems, confusion, decreased concentration and dysphoric mood. The patient is nervous/anxious.      Objective:     Vital Signs (Most Recent):  Temp: 97.1 °F (36.2 °C) (10/12/23 1114)  Pulse: 74 (10/12/23 1114)  Resp: 18 (10/12/23 1114)  BP: 131/75 (10/12/23 1114)  SpO2: 96 % (10/12/23 1114) Vital Signs (24h Range):  Temp:  [97.1 °F (36.2 °C)-98.6 °F (37 °C)] 97.1 °F (36.2 °C)  Pulse:  [71-91] 74  Resp:  [17-18] 18  SpO2:  [92 %-96 %] 96 %  BP: (124-137)/(71-97) 131/75     Weight: 86.2 kg (190 lb)  Body mass index is 27.26 kg/m².    Intake/Output Summary (Last 24 hours) at 10/12/2023 1143  Last data filed at 10/11/2023 1816  Gross per 24 hour   Intake 480 ml   Output 1600 ml   Net -1120 ml         Physical Exam  Vitals and nursing note reviewed.   Constitutional:       General: He is not in acute distress.     Appearance: He is ill-appearing. He is not toxic-appearing.      Interventions: Nasal cannula in place.   HENT:      Head: Normocephalic and atraumatic.   Cardiovascular:      Rate and Rhythm: Normal rate.   Pulmonary:      Effort: Tachypnea present. No respiratory distress.   Abdominal:      Palpations: Abdomen is soft.      Tenderness: There is no abdominal tenderness.   Musculoskeletal:      Right lower leg: No edema.      Left lower leg: No edema.   Skin:     General: Skin is warm and dry.      Capillary Refill: Capillary refill takes 2 to 3 seconds.      Coloration: Skin is pale.      Findings: Lesion (upper arm) present.   Neurological:      Mental Status:  He is alert and oriented to person, place, and time.      Motor: Weakness present.             Significant Labs: All pertinent labs within the past 24 hours have been reviewed.  Blood Culture:   Recent Labs   Lab 10/10/23  1645 10/10/23  1701   LABBLOO No Growth to date  No Growth to date No Growth to date  No Growth to date     CBC:   Recent Labs   Lab 10/10/23  1543 10/11/23  0602 10/12/23  0531   WBC 16.11* 15.79* 16.46*   HGB 15.2 15.7 16.0   HCT 46.7 48.0 48.8    311 341     CMP:   Recent Labs   Lab 10/10/23  1543 10/11/23  0602 10/12/23  0531    141 139   K 3.9 4.5 3.9    100 100   CO2 24 28 25   * 135* 131*   BUN 8 10 20   CREATININE 1.0 0.9 1.0   CALCIUM 10.0 10.2 9.8   PROT 8.7* 8.9* 8.1   ALBUMIN 3.7 3.5 3.4*   BILITOT 0.4 0.3 0.4   ALKPHOS 109 114 110   AST 30 37 46*   ALT 69* 83* 101*   ANIONGAP 14 13 14     Respiratory Culture:   Recent Labs   Lab 10/11/23  1419   GSRESP <10 epithelial cells per low power field.  Moderate WBC's  Rare Gram negative rods  Rare Gram positive rods  Rare Gram positive cocci       Significant Imaging: I have reviewed all pertinent imaging results/findings within the past 24 hours.  Echo: I have reviewed all pertinent results/findings within the past 24 hours and my personal findings are:  no pericardial effusion

## 2023-10-12 NOTE — PROGRESS NOTES
[facility] Medicine  Progress Note    Patient Name: Sawyer Hammond  MRN: [id number]  Patient Class: IP- Inpatient   Admission Date: 10/10/2023  Length of Stay: 2 days  Attending Physician: Laura Gardner MD  Primary Care Provider: Andrea Frye Doctor        Subjective:     Principal Problem:Sepsis        HPI:  Sawyer Hammond is a 48 y.o. male with a PMH  has a past medical history of Coronary artery disease involving native coronary artery of native heart with unstable angina pectoris (8/28/2023), ST elevation myocardial infarction involving right coronary artery (8/28/2023), and Tobacco use (8/28/2023).  Presented to the ER for evaluation of generalized weakness and shortness of breath progressively gotten worse since Thursday (10/5/23) patient reports his symptoms initially started with itchy throat and has progressed to fever and other flu-like symptoms.  Denies any improvement with OTC meds.  Denies recent illnesses or sick contacts.  Denies any other symptoms at this time.  ER workup revealed leukocytosis of 16.11,  mg/dL, CPK of 264, procalcitonin level of 0.39 with a CTA of the chest revealed multifocal pneumonia.  Lactic acid, troponin, BNP, flu/COVID, and chest x-ray were all negative for acute findings.  EKG revealed normal sinus rhythm with a ventricular rate of 83 beats per minute with a QT/QTC of 352/413.  Blood cultures obtained x2.  Patient received 2 albuterol breathing treatments, DuoNeb, 1 g Rocephin, 4 mg of Decadron, and 100 mg doxycycline in ED. hospital Medicine consulted to admit patient for sepsis/multifocal pneumonia.  Patient in agreement with treatment plan.  Patient will be admitted under inpatient status.    PCP: Melva Primary Doctor        Overview/Hospital Course:  10/11 admitted for meeting sirs criteria. Subjective fever, progressive dyspnea, continues to smoke and drink. Recent mi requiring stent placement. Continue intravenous  antibiotic(s), schedule breathing treatments, echocardiogram pending. Incentive spirometer. Does not wear supplemental oxygen at baseline  10/12 continues with hypoxia but states increased work of breathing is patient's norm. States using albuterol nebs at home without a prescription. Does not seek medical care at baseline. Continue antibiotic(s) and add pulmicort. Echocardiogram without pericardial effusion. Eager to go home.      Interval History: See hospital course for today      Review of Systems   Constitutional:  Positive for activity change. Negative for appetite change, fatigue and fever.   Respiratory:  Positive for shortness of breath.    Cardiovascular:  Negative for chest pain and leg swelling.   Gastrointestinal:  Negative for abdominal pain, nausea and vomiting.   Neurological:  Negative for weakness.   Psychiatric/Behavioral:  Negative for agitation, behavioral problems, confusion, decreased concentration and dysphoric mood. The patient is nervous/anxious.      Objective:     Vital Signs (Most Recent):  Temp: 97.1 °F (36.2 °C) (10/12/23 1114)  Pulse: 74 (10/12/23 1114)  Resp: 18 (10/12/23 1114)  BP: 131/75 (10/12/23 1114)  SpO2: 96 % (10/12/23 1114) Vital Signs (24h Range):  Temp:  [97.1 °F (36.2 °C)-98.6 °F (37 °C)] 97.1 °F (36.2 °C)  Pulse:  [71-91] 74  Resp:  [17-18] 18  SpO2:  [92 %-96 %] 96 %  BP: (124-137)/(71-97) 131/75     Weight: 86.2 kg (190 lb)  Body mass index is 27.26 kg/m².    Intake/Output Summary (Last 24 hours) at 10/12/2023 1143  Last data filed at 10/11/2023 1816  Gross per 24 hour   Intake 480 ml   Output 1600 ml   Net -1120 ml         Physical Exam  Vitals and nursing note reviewed.   Constitutional:       General: He is not in acute distress.     Appearance: He is ill-appearing. He is not toxic-appearing.      Interventions: Nasal cannula in place.   HENT:      Head: Normocephalic and atraumatic.   Cardiovascular:      Rate and Rhythm: Normal rate.   Pulmonary:      Effort:  Tachypnea present. No respiratory distress.   Abdominal:      Palpations: Abdomen is soft.      Tenderness: There is no abdominal tenderness.   Musculoskeletal:      Right lower leg: No edema.      Left lower leg: No edema.   Skin:     General: Skin is warm and dry.      Capillary Refill: Capillary refill takes 2 to 3 seconds.      Coloration: Skin is pale.      Findings: Lesion (upper arm) present.   Neurological:      Mental Status: He is alert and oriented to person, place, and time.      Motor: Weakness present.             Significant Labs: All pertinent labs within the past 24 hours have been reviewed.  Blood Culture:   Recent Labs   Lab 10/10/23  1645 10/10/23  1701   LABBLOO No Growth to date  No Growth to date No Growth to date  No Growth to date     CBC:   Recent Labs   Lab 10/10/23  1543 10/11/23  0602 10/12/23  0531   WBC 16.11* 15.79* 16.46*   HGB 15.2 15.7 16.0   HCT 46.7 48.0 48.8    311 341     CMP:   Recent Labs   Lab 10/10/23  1543 10/11/23  0602 10/12/23  0531    141 139   K 3.9 4.5 3.9    100 100   CO2 24 28 25   * 135* 131*   BUN 8 10 20   CREATININE 1.0 0.9 1.0   CALCIUM 10.0 10.2 9.8   PROT 8.7* 8.9* 8.1   ALBUMIN 3.7 3.5 3.4*   BILITOT 0.4 0.3 0.4   ALKPHOS 109 114 110   AST 30 37 46*   ALT 69* 83* 101*   ANIONGAP 14 13 14     Respiratory Culture:   Recent Labs   Lab 10/11/23  1419   GSRESP <10 epithelial cells per low power field.  Moderate WBC's  Rare Gram negative rods  Rare Gram positive rods  Rare Gram positive cocci       Significant Imaging: I have reviewed all pertinent imaging results/findings within the past 24 hours.  Echo: I have reviewed all pertinent results/findings within the past 24 hours and my personal findings are:  no pericardial effusion      Assessment/Plan:      * Sepsis  This patient does have evidence of infective focus  My overall impression is sepsis.  Source: Respiratory  Antibiotics given-   Antibiotics (72h ago, onward)    Start      Stop Route Frequency Ordered    10/11/23 1900  cefTRIAXone (ROCEPHIN) 1 g in dextrose 5 % in water (D5W) 100 mL IVPB (MB+)         -- IV Every 24 hours (non-standard times) 10/10/23 2043    10/11/23 0800  doxycycline (VIBRAMYCIN) 100 mg in dextrose 5 % in water (D5W) 100 mL IVPB (MB+)         -- IV Every 12 hours (non-standard times) 10/10/23 2043        Latest lactate reviewed-  Recent Labs   Lab 10/10/23  1645 10/10/23  2117   LACTATE 1.3 3.2*     Organ dysfunction indicated by none    Fluid challenge Not needed - patient is not hypotensive      Post- resuscitation assessment No - Post resuscitation assessment not needed       Will Not start Pressors- Levophed for MAP of 65  Source control achieved by: ABx, duonebs  Added pulmicort and acapella      Acute hypoxemic respiratory failure  Patient with Hypoxic Respiratory failure which is Acute.  he is not on home oxygen. Supplemental oxygen was provided and noted- Oxygen Concentration (%):  [28] 28    .   Signs/symptoms of respiratory failure include- increased work of breathing and respiratory distress. Contributing diagnoses includes - COPD and Pneumonia Labs and images were reviewed. Patient Has not had a recent ABG. Will treat underlying causes and adjust management of respiratory failure as follows- consider abg if no improvement in hypoxia; echocardiogram reviewed without pericardial effusion but concentric remodeling; added acapella and pulmicort; sputum culture with rare bacteria, continue antibiotic(s) as procalcitonin mildly elevated, hosptialized 2 months ago for stemi and may need to broaden spectrum if no improvement    Tobacco dependence  Dangers of cigarette smoking were reviewed with patient in detail. Patient was Counseled for 3-10 minutes. Nicotine replacement options were discussed. Nicotine replacement was discussed- not prescribed per patient's request    Coronary artery disease involving native coronary artery of native heart with unstable  angina pectoris  Patient with known CAD s/p stent placement, which is controlled Will continue brilinta, ASA and Statin and monitor for S/Sx of angina/ACS. Continue to monitor on telemetry.         VTE Risk Mitigation (From admission, onward)         Ordered     enoxaparin injection 40 mg  Daily         10/10/23 2043     Place RITO hose  Until discontinued         10/10/23 2043     IP VTE LOW RISK PATIENT  Once         10/10/23 2043                Discharge Planning   PINKY:      Code Status: Full Code   Is the patient medically ready for discharge?:     Reason for patient still in hospital (select all that apply): Patient trending condition, Laboratory test, Treatment and Pending disposition  Discharge Plan A: Home                  Laura Gardner MD  Department of Hospital Medicine   [facility] - Telemetry ([facility])

## 2023-10-13 LAB
ALBUMIN SERPL BCP-MCNC: 3.4 G/DL (ref 3.5–5.2)
ALP SERPL-CCNC: 105 U/L (ref 55–135)
ALT SERPL W/O P-5'-P-CCNC: 84 U/L (ref 10–44)
ANION GAP SERPL CALC-SCNC: 13 MMOL/L (ref 8–16)
AST SERPL-CCNC: 32 U/L (ref 10–40)
BASOPHILS NFR BLD: 1 % (ref 0–1.9)
BILIRUB SERPL-MCNC: 0.4 MG/DL (ref 0.1–1)
BUN SERPL-MCNC: 23 MG/DL (ref 6–20)
CALCIUM SERPL-MCNC: 10 MG/DL (ref 8.7–10.5)
CHLORIDE SERPL-SCNC: 97 MMOL/L (ref 95–110)
CO2 SERPL-SCNC: 31 MMOL/L (ref 23–29)
CREAT SERPL-MCNC: 1.1 MG/DL (ref 0.5–1.4)
DIFFERENTIAL METHOD: ABNORMAL
EOSINOPHIL NFR BLD: 2 % (ref 0–8)
ERYTHROCYTE [DISTWIDTH] IN BLOOD BY AUTOMATED COUNT: 13.4 % (ref 11.5–14.5)
EST. GFR  (NO RACE VARIABLE): >60 ML/MIN/1.73 M^2
GLUCOSE SERPL-MCNC: 131 MG/DL (ref 70–110)
HAV IGM SERPL QL IA: NORMAL
HBV CORE IGM SERPL QL IA: NORMAL
HBV SURFACE AG SERPL QL IA: NORMAL
HCT VFR BLD AUTO: 48.6 % (ref 40–54)
HCV AB SERPL QL IA: NORMAL
HGB BLD-MCNC: 15.7 G/DL (ref 14–18)
IMM GRANULOCYTES # BLD AUTO: ABNORMAL K/UL (ref 0–0.04)
IMM GRANULOCYTES NFR BLD AUTO: ABNORMAL % (ref 0–0.5)
LYMPHOCYTES NFR BLD: 42 % (ref 18–48)
MAGNESIUM SERPL-MCNC: 2.1 MG/DL (ref 1.6–2.6)
MCH RBC QN AUTO: 30.8 PG (ref 27–31)
MCHC RBC AUTO-ENTMCNC: 32.3 G/DL (ref 32–36)
MCV RBC AUTO: 95 FL (ref 82–98)
METAMYELOCYTES NFR BLD MANUAL: 2 %
MONOCYTES NFR BLD: 15 % (ref 4–15)
MYELOCYTES NFR BLD MANUAL: 3 %
NEUTROPHILS NFR BLD: 27 % (ref 38–73)
NEUTS BAND NFR BLD MANUAL: 8 %
NRBC BLD-RTO: 0 /100 WBC
PHOSPHATE SERPL-MCNC: 4.8 MG/DL (ref 2.7–4.5)
PLATELET # BLD AUTO: 353 K/UL (ref 150–450)
PMV BLD AUTO: 8.7 FL (ref 9.2–12.9)
POTASSIUM SERPL-SCNC: 4.4 MMOL/L (ref 3.5–5.1)
PROT SERPL-MCNC: 8.1 G/DL (ref 6–8.4)
RBC # BLD AUTO: 5.1 M/UL (ref 4.6–6.2)
SODIUM SERPL-SCNC: 141 MMOL/L (ref 136–145)
STOMATOCYTES BLD QL SMEAR: PRESENT
WBC # BLD AUTO: 17.06 K/UL (ref 3.9–12.7)

## 2023-10-13 PROCEDURE — 36415 COLL VENOUS BLD VENIPUNCTURE: CPT | Performed by: NURSE PRACTITIONER

## 2023-10-13 PROCEDURE — 94640 AIRWAY INHALATION TREATMENT: CPT

## 2023-10-13 PROCEDURE — 94761 N-INVAS EAR/PLS OXIMETRY MLT: CPT

## 2023-10-13 PROCEDURE — 25000003 PHARM REV CODE 250: Performed by: HOSPITALIST

## 2023-10-13 PROCEDURE — 94664 DEMO&/EVAL PT USE INHALER: CPT

## 2023-10-13 PROCEDURE — 84100 ASSAY OF PHOSPHORUS: CPT | Performed by: NURSE PRACTITIONER

## 2023-10-13 PROCEDURE — 21400001 HC TELEMETRY ROOM

## 2023-10-13 PROCEDURE — 25000242 PHARM REV CODE 250 ALT 637 W/ HCPCS: Performed by: FAMILY MEDICINE

## 2023-10-13 PROCEDURE — 63600175 PHARM REV CODE 636 W HCPCS: Performed by: NURSE PRACTITIONER

## 2023-10-13 PROCEDURE — 27000646 HC AEROBIKA DEVICE

## 2023-10-13 PROCEDURE — 94799 UNLISTED PULMONARY SVC/PX: CPT

## 2023-10-13 PROCEDURE — 85007 BL SMEAR W/DIFF WBC COUNT: CPT | Performed by: NURSE PRACTITIONER

## 2023-10-13 PROCEDURE — 80053 COMPREHEN METABOLIC PANEL: CPT | Performed by: NURSE PRACTITIONER

## 2023-10-13 PROCEDURE — 83735 ASSAY OF MAGNESIUM: CPT | Performed by: NURSE PRACTITIONER

## 2023-10-13 PROCEDURE — 85027 COMPLETE CBC AUTOMATED: CPT | Performed by: NURSE PRACTITIONER

## 2023-10-13 PROCEDURE — 27000221 HC OXYGEN, UP TO 24 HOURS

## 2023-10-13 PROCEDURE — 99900035 HC TECH TIME PER 15 MIN (STAT)

## 2023-10-13 PROCEDURE — 25000003 PHARM REV CODE 250: Performed by: NURSE PRACTITIONER

## 2023-10-13 RX ORDER — IPRATROPIUM BROMIDE AND ALBUTEROL SULFATE 2.5; .5 MG/3ML; MG/3ML
3 SOLUTION RESPIRATORY (INHALATION)
Status: DISCONTINUED | OUTPATIENT
Start: 2023-10-13 | End: 2023-10-15 | Stop reason: HOSPADM

## 2023-10-13 RX ORDER — IPRATROPIUM BROMIDE AND ALBUTEROL SULFATE 2.5; .5 MG/3ML; MG/3ML
3 SOLUTION RESPIRATORY (INHALATION) EVERY 8 HOURS
Status: DISCONTINUED | OUTPATIENT
Start: 2023-10-14 | End: 2023-10-13

## 2023-10-13 RX ADMIN — ACETAMINOPHEN 650 MG: 325 TABLET ORAL at 09:10

## 2023-10-13 RX ADMIN — LOSARTAN POTASSIUM 25 MG: 25 TABLET, FILM COATED ORAL at 09:10

## 2023-10-13 RX ADMIN — TICAGRELOR 90 MG: 90 TABLET ORAL at 10:10

## 2023-10-13 RX ADMIN — CEFTRIAXONE 1 G: 1 INJECTION, POWDER, FOR SOLUTION INTRAMUSCULAR; INTRAVENOUS at 06:10

## 2023-10-13 RX ADMIN — IPRATROPIUM BROMIDE AND ALBUTEROL SULFATE 3 ML: 2.5; .5 SOLUTION RESPIRATORY (INHALATION) at 07:10

## 2023-10-13 RX ADMIN — METOPROLOL TARTRATE 50 MG: 50 TABLET, FILM COATED ORAL at 08:10

## 2023-10-13 RX ADMIN — ENOXAPARIN SODIUM 40 MG: 40 INJECTION SUBCUTANEOUS at 06:10

## 2023-10-13 RX ADMIN — BUDESONIDE INHALATION 0.5 MG: 0.5 SUSPENSION RESPIRATORY (INHALATION) at 07:10

## 2023-10-13 RX ADMIN — Medication 6 MG: at 08:10

## 2023-10-13 RX ADMIN — DOXYCYCLINE 100 MG: 100 INJECTION, POWDER, LYOPHILIZED, FOR SOLUTION INTRAVENOUS at 09:10

## 2023-10-13 RX ADMIN — GUAIFENESIN AND DEXTROMETHORPHAN 10 ML: 100; 10 SYRUP ORAL at 09:10

## 2023-10-13 RX ADMIN — TICAGRELOR 90 MG: 90 TABLET ORAL at 08:10

## 2023-10-13 RX ADMIN — ATORVASTATIN CALCIUM 80 MG: 40 TABLET, FILM COATED ORAL at 09:10

## 2023-10-13 RX ADMIN — IPRATROPIUM BROMIDE AND ALBUTEROL SULFATE 3 ML: 2.5; .5 SOLUTION RESPIRATORY (INHALATION) at 12:10

## 2023-10-13 RX ADMIN — METOPROLOL TARTRATE 50 MG: 50 TABLET, FILM COATED ORAL at 09:10

## 2023-10-13 RX ADMIN — DOXYCYCLINE 100 MG: 100 INJECTION, POWDER, LYOPHILIZED, FOR SOLUTION INTRAVENOUS at 08:10

## 2023-10-13 RX ADMIN — ASPIRIN 81 MG: 81 TABLET, COATED ORAL at 09:10

## 2023-10-13 NOTE — PROGRESS NOTES
[facility] Medicine  Progress Note    Patient Name: Sawyer Hammond  MRN: [id number]  Patient Class: IP- Inpatient   Admission Date: 10/10/2023  Length of Stay: 3 days  Attending Physician: Laura Gardner MD  Primary Care Provider: Andrea Frye Doctor        Subjective:     Principal Problem:Sepsis        HPI:  Sawyer Hammond is a 48 y.o. male with a PMH  has a past medical history of Coronary artery disease involving native coronary artery of native heart with unstable angina pectoris (8/28/2023), ST elevation myocardial infarction involving right coronary artery (8/28/2023), and Tobacco use (8/28/2023).  Presented to the ER for evaluation of generalized weakness and shortness of breath progressively gotten worse since Thursday (10/5/23) patient reports his symptoms initially started with itchy throat and has progressed to fever and other flu-like symptoms.  Denies any improvement with OTC meds.  Denies recent illnesses or sick contacts.  Denies any other symptoms at this time.  ER workup revealed leukocytosis of 16.11,  mg/dL, CPK of 264, procalcitonin level of 0.39 with a CTA of the chest revealed multifocal pneumonia.  Lactic acid, troponin, BNP, flu/COVID, and chest x-ray were all negative for acute findings.  EKG revealed normal sinus rhythm with a ventricular rate of 83 beats per minute with a QT/QTC of 352/413.  Blood cultures obtained x2.  Patient received 2 albuterol breathing treatments, DuoNeb, 1 g Rocephin, 4 mg of Decadron, and 100 mg doxycycline in ED. hospital Medicine consulted to admit patient for sepsis/multifocal pneumonia.  Patient in agreement with treatment plan.  Patient will be admitted under inpatient status.    PCP: Melva Primary Doctor        Overview/Hospital Course:  10/11 admitted for meeting sirs criteria. Subjective fever, progressive dyspnea, continues to smoke and drink. Recent mi requiring stent placement. Continue intravenous  antibiotic(s), schedule breathing treatments, echocardiogram pending. Incentive spirometer. Does not wear supplemental oxygen at baseline  10/12 continues with hypoxia but states increased work of breathing is patient's norm. States using albuterol nebs at home without a prescription. Does not seek medical care at baseline. Continue antibiotic(s) and add pulmicort. Echocardiogram without pericardial effusion. Eager to go home.  10/13 modest improvement in dyspnea. Repeat chest x-ray pending.       Interval History: See hospital course for today      Review of Systems   Constitutional:  Positive for activity change and fatigue. Negative for appetite change and fever.   Respiratory:  Positive for shortness of breath.    Cardiovascular:  Negative for chest pain and leg swelling.   Gastrointestinal:  Negative for abdominal pain, nausea and vomiting.   Neurological:  Positive for weakness.   Hematological:  Bruises/bleeds easily.   Psychiatric/Behavioral:  Negative for agitation, behavioral problems, confusion, decreased concentration and dysphoric mood. The patient is not nervous/anxious.      Objective:     Vital Signs (Most Recent):  Temp: 98.4 °F (36.9 °C) (10/13/23 0717)  Pulse: 80 (10/13/23 0812)  Resp: 18 (10/13/23 0812)  BP: 138/81 (10/13/23 0717)  SpO2: (!) 92 % (10/13/23 0740) Vital Signs (24h Range):  Temp:  [97.1 °F (36.2 °C)-99.2 °F (37.3 °C)] 98.4 °F (36.9 °C)  Pulse:  [64-88] 80  Resp:  [17-20] 18  SpO2:  [92 %-98 %] 92 %  BP: (102-183)/(55-81) 138/81     Weight: 87.9 kg (193 lb 12.6 oz)  Body mass index is 27.81 kg/m².    Intake/Output Summary (Last 24 hours) at 10/13/2023 1055  Last data filed at 10/12/2023 2350  Gross per 24 hour   Intake --   Output 700 ml   Net -700 ml         Physical Exam  Vitals and nursing note reviewed.   Constitutional:       General: He is not in acute distress.     Appearance: He is ill-appearing. He is not toxic-appearing.      Interventions: Nasal cannula in place.   HENT:       Head: Normocephalic and atraumatic.   Cardiovascular:      Rate and Rhythm: Normal rate.   Pulmonary:      Effort: No tachypnea or respiratory distress.      Breath sounds: Wheezing present.   Abdominal:      Palpations: Abdomen is soft.      Tenderness: There is no abdominal tenderness.   Musculoskeletal:      Right lower leg: No edema.      Left lower leg: No edema.   Skin:     General: Skin is warm.      Coloration: Skin is pale.      Findings: Lesion present.   Neurological:      Mental Status: He is alert and oriented to person, place, and time.      Motor: Weakness present.   Psychiatric:         Mood and Affect: Mood normal.         Behavior: Behavior normal.             Significant Labs: All pertinent labs within the past 24 hours have been reviewed.  CBC:   Recent Labs   Lab 10/12/23  0531 10/13/23  0459   WBC 16.46* 17.06*   HGB 16.0 15.7   HCT 48.8 48.6    353     CMP:   Recent Labs   Lab 10/12/23  0531 10/13/23  0459    141   K 3.9 4.4    97   CO2 25 31*   * 131*   BUN 20 23*   CREATININE 1.0 1.1   CALCIUM 9.8 10.0   PROT 8.1 8.1   ALBUMIN 3.4* 3.4*   BILITOT 0.4 0.4   ALKPHOS 110 105   AST 46* 32   * 84*   ANIONGAP 14 13     Respiratory Culture:   Recent Labs   Lab 10/11/23  1419   GSRESP <10 epithelial cells per low power field.  Moderate WBC's  Rare Gram negative rods  Rare Gram positive rods  Rare Gram positive cocci   RESPIRATORYC Normal respiratory reza       Significant Imaging: I have reviewed all pertinent imaging results/findings within the past 24 hours.  CXR: I have reviewed all pertinent results/findings within the past 24 hours and my personal findings are:  pending      Assessment/Plan:      * Sepsis  This patient does have evidence of infective focus  My overall impression is sepsis.  Source: Respiratory  Antibiotics given-   Antibiotics (72h ago, onward)    Start     Stop Route Frequency Ordered    10/11/23 1900  cefTRIAXone (ROCEPHIN) 1 g in  dextrose 5 % in water (D5W) 100 mL IVPB (MB+)         -- IV Every 24 hours (non-standard times) 10/10/23 2043    10/11/23 0800  doxycycline (VIBRAMYCIN) 100 mg in dextrose 5 % in water (D5W) 100 mL IVPB (MB+)         -- IV Every 12 hours (non-standard times) 10/10/23 2043        Latest lactate reviewed-  Recent Labs   Lab 10/10/23  1645 10/10/23  2117   LACTATE 1.3 3.2*     Organ dysfunction indicated by none    Fluid challenge Not needed - patient is not hypotensive      Post- resuscitation assessment No - Post resuscitation assessment not needed       Will Not start Pressors- Levophed for MAP of 65  Source control achieved by: ABx, duonebs  Added pulmicort and acapella  Repeat chest x-ray pending       Acute on chronic diastolic heart failure  bnp within normal limits   Echocardiogram reviewed with concentric remodeling        Acute hypoxemic respiratory failure  Patient with Hypoxic Respiratory failure which is Acute.  he is not on home oxygen. Supplemental oxygen was provided and noted- Oxygen Concentration (%):  [28] 28    .   Signs/symptoms of respiratory failure include- increased work of breathing and respiratory distress. Contributing diagnoses includes - COPD and Pneumonia Labs and images were reviewed. Patient Has not had a recent ABG. Will treat underlying causes and adjust management of respiratory failure as follows- consider abg if no improvement in hypoxia; echocardiogram reviewed without pericardial effusion but concentric remodeling; added acapella and pulmicort; sputum culture with rare bacteria, continue antibiotic(s) as procalcitonin mildly elevated, hosptialized 2 months ago for stemi and may need to broaden spectrum if no improvement  Repeat chest x-ray pending    Tobacco dependence  Dangers of cigarette smoking were reviewed with patient in detail. Patient was Counseled for 3-10 minutes. Nicotine replacement options were discussed. Nicotine replacement was discussed- not prescribed per  patient's request    Coronary artery disease involving native coronary artery of native heart with unstable angina pectoris  Patient with known CAD s/p stent placement, which is controlled Will continue brilinta, ASA and Statin and monitor for S/Sx of angina/ACS. Continue to monitor on telemetry.         VTE Risk Mitigation (From admission, onward)         Ordered     enoxaparin injection 40 mg  Daily         10/10/23 2043     Place RITO hose  Until discontinued         10/10/23 2043     IP VTE LOW RISK PATIENT  Once         10/10/23 2043                Discharge Planning   PINKY: 10/13/2023     Code Status: Full Code   Is the patient medically ready for discharge?:     Reason for patient still in hospital (select all that apply): Patient trending condition, Laboratory test, Treatment and Pending disposition  Discharge Plan A: Home                  aLura Gardner MD  Department of Hospital Medicine   [facility] - Telemetry ([facility])

## 2023-10-13 NOTE — SUBJECTIVE & OBJECTIVE
Interval History: See hospital course for today      Review of Systems   Constitutional:  Positive for activity change and fatigue. Negative for appetite change and fever.   Respiratory:  Positive for shortness of breath.    Cardiovascular:  Negative for chest pain and leg swelling.   Gastrointestinal:  Negative for abdominal pain, nausea and vomiting.   Neurological:  Positive for weakness.   Hematological:  Bruises/bleeds easily.   Psychiatric/Behavioral:  Negative for agitation, behavioral problems, confusion, decreased concentration and dysphoric mood. The patient is not nervous/anxious.      Objective:     Vital Signs (Most Recent):  Temp: 98.4 °F (36.9 °C) (10/13/23 0717)  Pulse: 80 (10/13/23 0812)  Resp: 18 (10/13/23 0812)  BP: 138/81 (10/13/23 0717)  SpO2: (!) 92 % (10/13/23 0740) Vital Signs (24h Range):  Temp:  [97.1 °F (36.2 °C)-99.2 °F (37.3 °C)] 98.4 °F (36.9 °C)  Pulse:  [64-88] 80  Resp:  [17-20] 18  SpO2:  [92 %-98 %] 92 %  BP: (102-183)/(55-81) 138/81     Weight: 87.9 kg (193 lb 12.6 oz)  Body mass index is 27.81 kg/m².    Intake/Output Summary (Last 24 hours) at 10/13/2023 1055  Last data filed at 10/12/2023 2350  Gross per 24 hour   Intake --   Output 700 ml   Net -700 ml         Physical Exam  Vitals and nursing note reviewed.   Constitutional:       General: He is not in acute distress.     Appearance: He is ill-appearing. He is not toxic-appearing.      Interventions: Nasal cannula in place.   HENT:      Head: Normocephalic and atraumatic.   Cardiovascular:      Rate and Rhythm: Normal rate.   Pulmonary:      Effort: No tachypnea or respiratory distress.      Breath sounds: Wheezing present.   Abdominal:      Palpations: Abdomen is soft.      Tenderness: There is no abdominal tenderness.   Musculoskeletal:      Right lower leg: No edema.      Left lower leg: No edema.   Skin:     General: Skin is warm.      Coloration: Skin is pale.      Findings: Lesion present.   Neurological:      Mental  Status: He is alert and oriented to person, place, and time.      Motor: Weakness present.   Psychiatric:         Mood and Affect: Mood normal.         Behavior: Behavior normal.             Significant Labs: All pertinent labs within the past 24 hours have been reviewed.  CBC:   Recent Labs   Lab 10/12/23  0531 10/13/23  0459   WBC 16.46* 17.06*   HGB 16.0 15.7   HCT 48.8 48.6    353     CMP:   Recent Labs   Lab 10/12/23  0531 10/13/23  0459    141   K 3.9 4.4    97   CO2 25 31*   * 131*   BUN 20 23*   CREATININE 1.0 1.1   CALCIUM 9.8 10.0   PROT 8.1 8.1   ALBUMIN 3.4* 3.4*   BILITOT 0.4 0.4   ALKPHOS 110 105   AST 46* 32   * 84*   ANIONGAP 14 13     Respiratory Culture:   Recent Labs   Lab 10/11/23  1419   GSRESP <10 epithelial cells per low power field.  Moderate WBC's  Rare Gram negative rods  Rare Gram positive rods  Rare Gram positive cocci   RESPIRATORYC Normal respiratory reza       Significant Imaging: I have reviewed all pertinent imaging results/findings within the past 24 hours.  CXR: I have reviewed all pertinent results/findings within the past 24 hours and my personal findings are:  pending

## 2023-10-13 NOTE — ASSESSMENT & PLAN NOTE
This patient does have evidence of infective focus  My overall impression is sepsis.  Source: Respiratory  Antibiotics given-   Antibiotics (72h ago, onward)    Start     Stop Route Frequency Ordered    10/11/23 1900  cefTRIAXone (ROCEPHIN) 1 g in dextrose 5 % in water (D5W) 100 mL IVPB (MB+)         -- IV Every 24 hours (non-standard times) 10/10/23 2043    10/11/23 0800  doxycycline (VIBRAMYCIN) 100 mg in dextrose 5 % in water (D5W) 100 mL IVPB (MB+)         -- IV Every 12 hours (non-standard times) 10/10/23 2043        Latest lactate reviewed-  Recent Labs   Lab 10/10/23  1645 10/10/23  2117   LACTATE 1.3 3.2*     Organ dysfunction indicated by none    Fluid challenge Not needed - patient is not hypotensive      Post- resuscitation assessment No - Post resuscitation assessment not needed       Will Not start Pressors- Levophed for MAP of 65  Source control achieved by: ABx, duonebs  Added pulmicort and acapella  Repeat chest x-ray pending      gradual onset/worsening

## 2023-10-13 NOTE — ASSESSMENT & PLAN NOTE
Patient with Hypoxic Respiratory failure which is Acute.  he is not on home oxygen. Supplemental oxygen was provided and noted- Oxygen Concentration (%):  [28] 28    .   Signs/symptoms of respiratory failure include- increased work of breathing and respiratory distress. Contributing diagnoses includes - COPD and Pneumonia Labs and images were reviewed. Patient Has not had a recent ABG. Will treat underlying causes and adjust management of respiratory failure as follows- consider abg if no improvement in hypoxia; echocardiogram reviewed without pericardial effusion but concentric remodeling; added acapella and pulmicort; sputum culture with rare bacteria, continue antibiotic(s) as procalcitonin mildly elevated, hosptialized 2 months ago for stemi and may need to broaden spectrum if no improvement  Repeat chest x-ray pending

## 2023-10-14 LAB
ALBUMIN SERPL BCP-MCNC: 3.5 G/DL (ref 3.5–5.2)
ALP SERPL-CCNC: 93 U/L (ref 55–135)
ALT SERPL W/O P-5'-P-CCNC: 76 U/L (ref 10–44)
ANION GAP SERPL CALC-SCNC: 12 MMOL/L (ref 8–16)
AST SERPL-CCNC: 27 U/L (ref 10–40)
BASOPHILS # BLD AUTO: ABNORMAL K/UL (ref 0–0.2)
BASOPHILS NFR BLD: 1 % (ref 0–1.9)
BILIRUB SERPL-MCNC: 0.4 MG/DL (ref 0.1–1)
BUN SERPL-MCNC: 22 MG/DL (ref 6–20)
CALCIUM SERPL-MCNC: 10 MG/DL (ref 8.7–10.5)
CHLORIDE SERPL-SCNC: 98 MMOL/L (ref 95–110)
CO2 SERPL-SCNC: 29 MMOL/L (ref 23–29)
CREAT SERPL-MCNC: 1 MG/DL (ref 0.5–1.4)
DIFFERENTIAL METHOD: ABNORMAL
EOSINOPHIL # BLD AUTO: ABNORMAL K/UL (ref 0–0.5)
EOSINOPHIL NFR BLD: 2 % (ref 0–8)
ERYTHROCYTE [DISTWIDTH] IN BLOOD BY AUTOMATED COUNT: 13.2 % (ref 11.5–14.5)
EST. GFR  (NO RACE VARIABLE): >60 ML/MIN/1.73 M^2
GLUCOSE SERPL-MCNC: 128 MG/DL (ref 70–110)
HCT VFR BLD AUTO: 50.2 % (ref 40–54)
HGB BLD-MCNC: 16 G/DL (ref 14–18)
IMM GRANULOCYTES # BLD AUTO: ABNORMAL K/UL (ref 0–0.04)
IMM GRANULOCYTES NFR BLD AUTO: ABNORMAL % (ref 0–0.5)
LYMPHOCYTES # BLD AUTO: ABNORMAL K/UL (ref 1–4.8)
LYMPHOCYTES NFR BLD: 19 % (ref 18–48)
MAGNESIUM SERPL-MCNC: 2.3 MG/DL (ref 1.6–2.6)
MCH RBC QN AUTO: 30.4 PG (ref 27–31)
MCHC RBC AUTO-ENTMCNC: 31.9 G/DL (ref 32–36)
MCV RBC AUTO: 95 FL (ref 82–98)
METAMYELOCYTES NFR BLD MANUAL: 2 %
MONOCYTES # BLD AUTO: ABNORMAL K/UL (ref 0.3–1)
MONOCYTES NFR BLD: 12 % (ref 4–15)
MYELOCYTES NFR BLD MANUAL: 5 %
NEUTROPHILS NFR BLD: 59 % (ref 38–73)
NRBC BLD-RTO: 0 /100 WBC
PHOSPHATE SERPL-MCNC: 4.6 MG/DL (ref 2.7–4.5)
PLATELET # BLD AUTO: 387 K/UL (ref 150–450)
PLATELET BLD QL SMEAR: ABNORMAL
PMV BLD AUTO: 8.6 FL (ref 9.2–12.9)
POTASSIUM SERPL-SCNC: 4.1 MMOL/L (ref 3.5–5.1)
PROT SERPL-MCNC: 8.4 G/DL (ref 6–8.4)
RBC # BLD AUTO: 5.26 M/UL (ref 4.6–6.2)
SODIUM SERPL-SCNC: 139 MMOL/L (ref 136–145)
WBC # BLD AUTO: 17.97 K/UL (ref 3.9–12.7)

## 2023-10-14 PROCEDURE — 63600175 PHARM REV CODE 636 W HCPCS: Performed by: NURSE PRACTITIONER

## 2023-10-14 PROCEDURE — 94761 N-INVAS EAR/PLS OXIMETRY MLT: CPT

## 2023-10-14 PROCEDURE — 25000003 PHARM REV CODE 250: Performed by: NURSE PRACTITIONER

## 2023-10-14 PROCEDURE — 85007 BL SMEAR W/DIFF WBC COUNT: CPT | Performed by: NURSE PRACTITIONER

## 2023-10-14 PROCEDURE — 94799 UNLISTED PULMONARY SVC/PX: CPT

## 2023-10-14 PROCEDURE — 80053 COMPREHEN METABOLIC PANEL: CPT | Performed by: NURSE PRACTITIONER

## 2023-10-14 PROCEDURE — 27000646 HC AEROBIKA DEVICE

## 2023-10-14 PROCEDURE — 21400001 HC TELEMETRY ROOM

## 2023-10-14 PROCEDURE — 83735 ASSAY OF MAGNESIUM: CPT | Performed by: NURSE PRACTITIONER

## 2023-10-14 PROCEDURE — 25000003 PHARM REV CODE 250: Performed by: FAMILY MEDICINE

## 2023-10-14 PROCEDURE — 94664 DEMO&/EVAL PT USE INHALER: CPT

## 2023-10-14 PROCEDURE — 27000221 HC OXYGEN, UP TO 24 HOURS

## 2023-10-14 PROCEDURE — 25000242 PHARM REV CODE 250 ALT 637 W/ HCPCS: Performed by: FAMILY MEDICINE

## 2023-10-14 PROCEDURE — 36415 COLL VENOUS BLD VENIPUNCTURE: CPT | Performed by: NURSE PRACTITIONER

## 2023-10-14 PROCEDURE — 25000003 PHARM REV CODE 250: Performed by: HOSPITALIST

## 2023-10-14 PROCEDURE — 94640 AIRWAY INHALATION TREATMENT: CPT

## 2023-10-14 PROCEDURE — 85027 COMPLETE CBC AUTOMATED: CPT | Performed by: NURSE PRACTITIONER

## 2023-10-14 PROCEDURE — 84100 ASSAY OF PHOSPHORUS: CPT | Performed by: NURSE PRACTITIONER

## 2023-10-14 PROCEDURE — 63600175 PHARM REV CODE 636 W HCPCS: Performed by: FAMILY MEDICINE

## 2023-10-14 PROCEDURE — 99900035 HC TECH TIME PER 15 MIN (STAT)

## 2023-10-14 RX ADMIN — CEFEPIME 2 G: 2 INJECTION, POWDER, FOR SOLUTION INTRAVENOUS at 11:10

## 2023-10-14 RX ADMIN — DOXYCYCLINE 100 MG: 100 INJECTION, POWDER, LYOPHILIZED, FOR SOLUTION INTRAVENOUS at 09:10

## 2023-10-14 RX ADMIN — ASPIRIN 81 MG: 81 TABLET, COATED ORAL at 09:10

## 2023-10-14 RX ADMIN — BUDESONIDE INHALATION 0.5 MG: 0.5 SUSPENSION RESPIRATORY (INHALATION) at 07:10

## 2023-10-14 RX ADMIN — IPRATROPIUM BROMIDE AND ALBUTEROL SULFATE 3 ML: 2.5; .5 SOLUTION RESPIRATORY (INHALATION) at 07:10

## 2023-10-14 RX ADMIN — TICAGRELOR 90 MG: 90 TABLET ORAL at 09:10

## 2023-10-14 RX ADMIN — TICAGRELOR 90 MG: 90 TABLET ORAL at 08:10

## 2023-10-14 RX ADMIN — LOSARTAN POTASSIUM 25 MG: 25 TABLET, FILM COATED ORAL at 09:10

## 2023-10-14 RX ADMIN — METOPROLOL TARTRATE 50 MG: 50 TABLET, FILM COATED ORAL at 08:10

## 2023-10-14 RX ADMIN — IPRATROPIUM BROMIDE AND ALBUTEROL SULFATE 3 ML: 2.5; .5 SOLUTION RESPIRATORY (INHALATION) at 03:10

## 2023-10-14 RX ADMIN — METOPROLOL TARTRATE 50 MG: 50 TABLET, FILM COATED ORAL at 09:10

## 2023-10-14 RX ADMIN — ATORVASTATIN CALCIUM 80 MG: 40 TABLET, FILM COATED ORAL at 09:10

## 2023-10-14 RX ADMIN — GUAIFENESIN AND DEXTROMETHORPHAN 10 ML: 100; 10 SYRUP ORAL at 09:10

## 2023-10-14 RX ADMIN — ENOXAPARIN SODIUM 40 MG: 40 INJECTION SUBCUTANEOUS at 06:10

## 2023-10-14 NOTE — SUBJECTIVE & OBJECTIVE
Interval History: See hospital course for today      Review of Systems   Constitutional:  Positive for activity change and fatigue. Negative for appetite change and fever.   HENT:  Positive for congestion.    Respiratory:  Positive for cough and shortness of breath.    Cardiovascular:  Negative for chest pain.   Gastrointestinal:  Negative for abdominal pain, nausea and vomiting.   Neurological:  Positive for weakness.   Hematological:  Bruises/bleeds easily.   Psychiatric/Behavioral:  Positive for dysphoric mood. Negative for agitation, behavioral problems, confusion and decreased concentration. The patient is nervous/anxious.      Objective:     Vital Signs (Most Recent):  Temp: 97.9 °F (36.6 °C) (10/14/23 0719)  Pulse: 76 (10/14/23 0730)  Resp: 18 (10/14/23 0730)  BP: 120/66 (10/14/23 0719)  SpO2: 96 % (10/14/23 0730) Vital Signs (24h Range):  Temp:  [97.3 °F (36.3 °C)-98.4 °F (36.9 °C)] 97.9 °F (36.6 °C)  Pulse:  [72-88] 76  Resp:  [18-20] 18  SpO2:  [90 %-96 %] 96 %  BP: (112-129)/(58-69) 120/66     Weight: 87.8 kg (193 lb 9 oz)  Body mass index is 27.77 kg/m².    Intake/Output Summary (Last 24 hours) at 10/14/2023 0942  Last data filed at 10/14/2023 0400  Gross per 24 hour   Intake 240 ml   Output 1050 ml   Net -810 ml         Physical Exam  Vitals and nursing note reviewed.   Constitutional:       General: He is not in acute distress.     Appearance: He is ill-appearing. He is not toxic-appearing.   HENT:      Head: Normocephalic and atraumatic.   Cardiovascular:      Rate and Rhythm: Normal rate.   Pulmonary:      Effort: Pulmonary effort is normal. No tachypnea or respiratory distress.      Breath sounds: Examination of the right-lower field reveals rales. Examination of the left-lower field reveals rales. Decreased breath sounds and rales present.   Abdominal:      Palpations: Abdomen is soft.      Tenderness: There is no abdominal tenderness.   Musculoskeletal:      Right lower leg: No edema.      Left  lower leg: No edema.   Skin:     General: Skin is warm.      Findings: Bruising and lesion present.   Neurological:      Mental Status: He is alert and oriented to person, place, and time.      Motor: Weakness present.   Psychiatric:         Mood and Affect: Mood is anxious and depressed.         Speech: Speech normal.         Behavior: Behavior normal. Behavior is cooperative.             Significant Labs: All pertinent labs within the past 24 hours have been reviewed.  Blood Culture: negative growth to date   CBC:   Recent Labs   Lab 10/13/23  0459 10/14/23  0503   WBC 17.06* 17.97*   HGB 15.7 16.0   HCT 48.6 50.2    387     CMP:   Recent Labs   Lab 10/13/23  0459 10/14/23  0503    139   K 4.4 4.1   CL 97 98   CO2 31* 29   * 128*   BUN 23* 22*   CREATININE 1.1 1.0   CALCIUM 10.0 10.0   PROT 8.1 8.4   ALBUMIN 3.4* 3.5   BILITOT 0.4 0.4   ALKPHOS 105 93   AST 32 27   ALT 84* 76*   ANIONGAP 13 12         Significant Imaging: I have reviewed all pertinent imaging results/findings within the past 24 hours.  CXR: I have reviewed all pertinent results/findings within the past 24 hours and my personal findings are:  similar findings as previous

## 2023-10-14 NOTE — ASSESSMENT & PLAN NOTE
Patient with Hypoxic Respiratory failure which is Acute.  he is not on home oxygen. Supplemental oxygen was provided and noted- Oxygen Concentration (%):  [28] 28    .   Signs/symptoms of respiratory failure include- increased work of breathing and respiratory distress. Contributing diagnoses includes - COPD and Pneumonia Labs and images were reviewed. Patient Has not had a recent ABG. Will treat underlying causes and adjust management of respiratory failure as follows- consider abg if no improvement in hypoxia; echocardiogram reviewed without pericardial effusion but concentric remodeling; added acapella and pulmicort; sputum culture with rare bacteria, continue antibiotic(s) as procalcitonin mildly elevated, hosptialized 2 months ago for stemi and may need to broaden spectrum if no improvement  Repeat chest x-ray reviewed

## 2023-10-14 NOTE — ASSESSMENT & PLAN NOTE
"This patient does have evidence of infective focus  My overall impression is sepsis.  Source: Respiratory  Antibiotics given-   Antibiotics (72h ago, onward)    Start     Stop Route Frequency Ordered    10/11/23 1900  cefTRIAXone (ROCEPHIN) 1 g in dextrose 5 % in water (D5W) 100 mL IVPB (MB+)         -- IV Every 24 hours (non-standard times) 10/10/23 2043    10/11/23 0800  doxycycline (VIBRAMYCIN) 100 mg in dextrose 5 % in water (D5W) 100 mL IVPB (MB+)         -- IV Every 12 hours (non-standard times) 10/10/23 2043        Latest lactate reviewed-  No results for input(s): "LACTATE" in the last 72 hours.  Organ dysfunction indicated by none    Fluid challenge Not needed - patient is not hypotensive      Post- resuscitation assessment No - Post resuscitation assessment not needed       Will Not start Pressors- Levophed for MAP of 65  Source control achieved by: ABx, duonebs  Added pulmicort and acapella  Repeat chest x-ray similar findings as previous  Encouraged incentive spirometer   Blood culture negative growth to date   Broaden antibiotic(s) coverage    "

## 2023-10-14 NOTE — PLAN OF CARE
Problem: Adult Inpatient Plan of Care  Goal: Plan of Care Review  Outcome: Ongoing, Progressing  Goal: Patient-Specific Goal (Individualized)  Outcome: Ongoing, Progressing  Goal: Absence of Hospital-Acquired Illness or Injury  Outcome: Ongoing, Progressing  Goal: Optimal Comfort and Wellbeing  Outcome: Ongoing, Progressing  Goal: Readiness for Transition of Care  Outcome: Ongoing, Progressing     Problem: Bleeding (Sepsis/Septic Shock)  Goal: Absence of Bleeding  Outcome: Ongoing, Progressing     Problem: Glycemic Control Impaired (Sepsis/Septic Shock)  Goal: Blood Glucose Level Within Desired Range  Outcome: Ongoing, Progressing     Problem: Infection Progression (Sepsis/Septic Shock)  Goal: Absence of Infection Signs and Symptoms  Outcome: Ongoing, Progressing     Problem: Nutrition Impaired (Sepsis/Septic Shock)  Goal: Optimal Nutrition Intake  Outcome: Ongoing, Progressing

## 2023-10-14 NOTE — PROGRESS NOTES
[facility]  Progress Note    Patient Name: Sawyer Hammond  MRN: [id number]  Patient Class: IP- Inpatient   Admission Date: 10/10/2023  Length of Stay: 4 days  Attending Physician: Laura Gardner MD  Primary Care Provider: Andrea Frye Doctor        Subjective:     Principal Problem:Sepsis        HPI:  Sawyer Hammond is a 48 y.o. male with a PMH  has a past medical history of Coronary artery disease involving native coronary artery of native heart with unstable angina pectoris (8/28/2023), ST elevation myocardial infarction involving right coronary artery (8/28/2023), and Tobacco use (8/28/2023).  Presented to the ER for evaluation of generalized weakness and shortness of breath progressively gotten worse since Thursday (10/5/23) patient reports his symptoms initially started with itchy throat and has progressed to fever and other flu-like symptoms.  Denies any improvement with OTC meds.  Denies recent illnesses or sick contacts.  Denies any other symptoms at this time.  ER workup revealed leukocytosis of 16.11,  mg/dL, CPK of 264, procalcitonin level of 0.39 with a CTA of the chest revealed multifocal pneumonia.  Lactic acid, troponin, BNP, flu/COVID, and chest x-ray were all negative for acute findings.  EKG revealed normal sinus rhythm with a ventricular rate of 83 beats per minute with a QT/QTC of 352/413.  Blood cultures obtained x2.  Patient received 2 albuterol breathing treatments, DuoNeb, 1 g Rocephin, 4 mg of Decadron, and 100 mg doxycycline in ED. hospital Medicine consulted to admit patient for sepsis/multifocal pneumonia.  Patient in agreement with treatment plan.  Patient will be admitted under inpatient status.    PCP: Melva Primary Doctor        Overview/Hospital Course:  10/11 admitted for meeting sirs criteria. Subjective fever, progressive dyspnea, continues to smoke and drink. Recent mi requiring stent placement. Continue intravenous  antibiotic(s), schedule breathing treatments, echocardiogram pending. Incentive spirometer. Does not wear supplemental oxygen at baseline  10/12 continues with hypoxia but states increased work of breathing is patient's norm. States using albuterol nebs at home without a prescription. Does not seek medical care at baseline. Continue antibiotic(s) and add pulmicort. Echocardiogram without pericardial effusion. Eager to go home.  10/13 modest improvement in dyspnea. Repeat chest x-ray pending.   10/14 endorses improvement in symptoms of dyspnea. Leukocytosis persists. Repeat chest x-ray unchanged. Continue current regimen      Interval History: See hospital course for today      Review of Systems   Constitutional:  Positive for activity change and fatigue. Negative for appetite change and fever.   HENT:  Positive for congestion.    Respiratory:  Positive for cough and shortness of breath.    Cardiovascular:  Negative for chest pain.   Gastrointestinal:  Negative for abdominal pain, nausea and vomiting.   Neurological:  Positive for weakness.   Hematological:  Bruises/bleeds easily.   Psychiatric/Behavioral:  Positive for dysphoric mood. Negative for agitation, behavioral problems, confusion and decreased concentration. The patient is nervous/anxious.      Objective:     Vital Signs (Most Recent):  Temp: 97.9 °F (36.6 °C) (10/14/23 0719)  Pulse: 76 (10/14/23 0730)  Resp: 18 (10/14/23 0730)  BP: 120/66 (10/14/23 0719)  SpO2: 96 % (10/14/23 0730) Vital Signs (24h Range):  Temp:  [97.3 °F (36.3 °C)-98.4 °F (36.9 °C)] 97.9 °F (36.6 °C)  Pulse:  [72-88] 76  Resp:  [18-20] 18  SpO2:  [90 %-96 %] 96 %  BP: (112-129)/(58-69) 120/66     Weight: 87.8 kg (193 lb 9 oz)  Body mass index is 27.77 kg/m².    Intake/Output Summary (Last 24 hours) at 10/14/2023 0942  Last data filed at 10/14/2023 0400  Gross per 24 hour   Intake 240 ml   Output 1050 ml   Net -810 ml         Physical Exam  Vitals and nursing note reviewed.    Constitutional:       General: He is not in acute distress.     Appearance: He is ill-appearing. He is not toxic-appearing.   HENT:      Head: Normocephalic and atraumatic.   Cardiovascular:      Rate and Rhythm: Normal rate.   Pulmonary:      Effort: Pulmonary effort is normal. No tachypnea or respiratory distress.      Breath sounds: Examination of the right-lower field reveals rales. Examination of the left-lower field reveals rales. Decreased breath sounds and rales present.   Abdominal:      Palpations: Abdomen is soft.      Tenderness: There is no abdominal tenderness.   Musculoskeletal:      Right lower leg: No edema.      Left lower leg: No edema.   Skin:     General: Skin is warm.      Findings: Bruising and lesion present.   Neurological:      Mental Status: He is alert and oriented to person, place, and time.      Motor: Weakness present.   Psychiatric:         Mood and Affect: Mood is anxious and depressed.         Speech: Speech normal.         Behavior: Behavior normal. Behavior is cooperative.             Significant Labs: All pertinent labs within the past 24 hours have been reviewed.  Blood Culture: negative growth to date   CBC:   Recent Labs   Lab 10/13/23  0459 10/14/23  0503   WBC 17.06* 17.97*   HGB 15.7 16.0   HCT 48.6 50.2    387     CMP:   Recent Labs   Lab 10/13/23  0459 10/14/23  0503    139   K 4.4 4.1   CL 97 98   CO2 31* 29   * 128*   BUN 23* 22*   CREATININE 1.1 1.0   CALCIUM 10.0 10.0   PROT 8.1 8.4   ALBUMIN 3.4* 3.5   BILITOT 0.4 0.4   ALKPHOS 105 93   AST 32 27   ALT 84* 76*   ANIONGAP 13 12         Significant Imaging: I have reviewed all pertinent imaging results/findings within the past 24 hours.  CXR: I have reviewed all pertinent results/findings within the past 24 hours and my personal findings are:  similar findings as previous      Assessment/Plan:      * Sepsis  This patient does have evidence of infective focus  My overall impression is  "sepsis.  Source: Respiratory  Antibiotics given-   Antibiotics (72h ago, onward)    Start     Stop Route Frequency Ordered    10/11/23 1900  cefTRIAXone (ROCEPHIN) 1 g in dextrose 5 % in water (D5W) 100 mL IVPB (MB+)         -- IV Every 24 hours (non-standard times) 10/10/23 2043    10/11/23 0800  doxycycline (VIBRAMYCIN) 100 mg in dextrose 5 % in water (D5W) 100 mL IVPB (MB+)         -- IV Every 12 hours (non-standard times) 10/10/23 2043        Latest lactate reviewed-  No results for input(s): "LACTATE" in the last 72 hours.  Organ dysfunction indicated by none    Fluid challenge Not needed - patient is not hypotensive      Post- resuscitation assessment No - Post resuscitation assessment not needed       Will Not start Pressors- Levophed for MAP of 65  Source control achieved by: ABx, duonebs  Added pulmicort and acapella  Repeat chest x-ray similar findings as previous  Encouraged incentive spirometer   Blood culture negative growth to date   Broaden antibiotic(s) coverage      Acute on chronic diastolic heart failure  bnp within normal limits   Echocardiogram reviewed with concentric remodeling        Acute hypoxemic respiratory failure  Patient with Hypoxic Respiratory failure which is Acute.  he is not on home oxygen. Supplemental oxygen was provided and noted- Oxygen Concentration (%):  [28] 28    .   Signs/symptoms of respiratory failure include- increased work of breathing and respiratory distress. Contributing diagnoses includes - COPD and Pneumonia Labs and images were reviewed. Patient Has not had a recent ABG. Will treat underlying causes and adjust management of respiratory failure as follows- consider abg if no improvement in hypoxia; echocardiogram reviewed without pericardial effusion but concentric remodeling; added acapella and pulmicort; sputum culture with rare bacteria, continue antibiotic(s) as procalcitonin mildly elevated, hosptialized 2 months ago for stemi and may need to broaden " spectrum if no improvement  Repeat chest x-ray reviewed    Tobacco dependence  Dangers of cigarette smoking were reviewed with patient in detail. Patient was Counseled for 3-10 minutes. Nicotine replacement options were discussed. Nicotine replacement was discussed- not prescribed per patient's request    Coronary artery disease involving native coronary artery of native heart with unstable angina pectoris  Patient with known CAD s/p stent placement, which is controlled Will continue brilinta, ASA and Statin and monitor for S/Sx of angina/ACS. Continue to monitor on telemetry.         VTE Risk Mitigation (From admission, onward)         Ordered     enoxaparin injection 40 mg  Daily         10/10/23 2043     Place RITO hose  Until discontinued         10/10/23 2043     IP VTE LOW RISK PATIENT  Once         10/10/23 2043                Discharge Planning   PINKY: 10/13/2023     Code Status: Full Code   Is the patient medically ready for discharge?:     Reason for patient still in hospital (select all that apply): Patient trending condition, Laboratory test, Treatment and Pending disposition  Discharge Plan A: Home                  Laura Gardner MD  Department of Hospital Medicine   '[place] - Telemetry ([facility])

## 2023-10-15 VITALS
TEMPERATURE: 98 F | SYSTOLIC BLOOD PRESSURE: 107 MMHG | HEART RATE: 76 BPM | WEIGHT: 194.44 LBS | BODY MASS INDEX: 27.84 KG/M2 | RESPIRATION RATE: 16 BRPM | OXYGEN SATURATION: 96 % | DIASTOLIC BLOOD PRESSURE: 72 MMHG | HEIGHT: 70 IN

## 2023-10-15 PROBLEM — I50.33 ACUTE ON CHRONIC DIASTOLIC HEART FAILURE: Status: RESOLVED | Noted: 2023-10-12 | Resolved: 2023-10-15

## 2023-10-15 PROBLEM — A41.9 SEPSIS: Status: RESOLVED | Noted: 2023-10-10 | Resolved: 2023-10-15

## 2023-10-15 PROBLEM — J96.01 ACUTE HYPOXEMIC RESPIRATORY FAILURE: Status: RESOLVED | Noted: 2023-10-12 | Resolved: 2023-10-15

## 2023-10-15 PROBLEM — D72.829 LEUKOCYTOSIS: Status: ACTIVE | Noted: 2023-10-15

## 2023-10-15 LAB
ALBUMIN SERPL BCP-MCNC: 3.3 G/DL (ref 3.5–5.2)
ALP SERPL-CCNC: 86 U/L (ref 55–135)
ALT SERPL W/O P-5'-P-CCNC: 66 U/L (ref 10–44)
ANION GAP SERPL CALC-SCNC: 11 MMOL/L (ref 8–16)
AST SERPL-CCNC: 24 U/L (ref 10–40)
BACTERIA BLD CULT: NORMAL
BACTERIA BLD CULT: NORMAL
BASOPHILS NFR BLD: 0 % (ref 0–1.9)
BILIRUB SERPL-MCNC: 0.2 MG/DL (ref 0.1–1)
BUN SERPL-MCNC: 20 MG/DL (ref 6–20)
CALCIUM SERPL-MCNC: 9.3 MG/DL (ref 8.7–10.5)
CHLORIDE SERPL-SCNC: 98 MMOL/L (ref 95–110)
CO2 SERPL-SCNC: 27 MMOL/L (ref 23–29)
CREAT SERPL-MCNC: 1.1 MG/DL (ref 0.5–1.4)
DIFFERENTIAL METHOD: ABNORMAL
EOSINOPHIL NFR BLD: 2 % (ref 0–8)
ERYTHROCYTE [DISTWIDTH] IN BLOOD BY AUTOMATED COUNT: 13.1 % (ref 11.5–14.5)
EST. GFR  (NO RACE VARIABLE): >60 ML/MIN/1.73 M^2
GLUCOSE SERPL-MCNC: 132 MG/DL (ref 70–110)
HCT VFR BLD AUTO: 46.9 % (ref 40–54)
HGB BLD-MCNC: 15.3 G/DL (ref 14–18)
IMM GRANULOCYTES # BLD AUTO: ABNORMAL K/UL (ref 0–0.04)
IMM GRANULOCYTES NFR BLD AUTO: ABNORMAL % (ref 0–0.5)
LYMPHOCYTES NFR BLD: 27 % (ref 18–48)
MAGNESIUM SERPL-MCNC: 2.1 MG/DL (ref 1.6–2.6)
MCH RBC QN AUTO: 31 PG (ref 27–31)
MCHC RBC AUTO-ENTMCNC: 32.6 G/DL (ref 32–36)
MCV RBC AUTO: 95 FL (ref 82–98)
METAMYELOCYTES NFR BLD MANUAL: 2 %
MONOCYTES NFR BLD: 6 % (ref 4–15)
MYELOCYTES NFR BLD MANUAL: 2 %
NEUTROPHILS NFR BLD: 57 % (ref 38–73)
NEUTS BAND NFR BLD MANUAL: 4 %
NRBC BLD-RTO: 0 /100 WBC
PHOSPHATE SERPL-MCNC: 4.4 MG/DL (ref 2.7–4.5)
PLATELET # BLD AUTO: 331 K/UL (ref 150–450)
PMV BLD AUTO: 8.4 FL (ref 9.2–12.9)
POTASSIUM SERPL-SCNC: 4 MMOL/L (ref 3.5–5.1)
PROT SERPL-MCNC: 7.7 G/DL (ref 6–8.4)
RBC # BLD AUTO: 4.93 M/UL (ref 4.6–6.2)
SODIUM SERPL-SCNC: 136 MMOL/L (ref 136–145)
STOMATOCYTES BLD QL SMEAR: PRESENT
WBC # BLD AUTO: 17.76 K/UL (ref 3.9–12.7)

## 2023-10-15 PROCEDURE — 25000003 PHARM REV CODE 250: Performed by: NURSE PRACTITIONER

## 2023-10-15 PROCEDURE — 85027 COMPLETE CBC AUTOMATED: CPT | Performed by: NURSE PRACTITIONER

## 2023-10-15 PROCEDURE — 84100 ASSAY OF PHOSPHORUS: CPT | Performed by: NURSE PRACTITIONER

## 2023-10-15 PROCEDURE — 27000646 HC AEROBIKA DEVICE

## 2023-10-15 PROCEDURE — 94664 DEMO&/EVAL PT USE INHALER: CPT

## 2023-10-15 PROCEDURE — 36415 COLL VENOUS BLD VENIPUNCTURE: CPT | Performed by: NURSE PRACTITIONER

## 2023-10-15 PROCEDURE — 83735 ASSAY OF MAGNESIUM: CPT | Performed by: NURSE PRACTITIONER

## 2023-10-15 PROCEDURE — 94799 UNLISTED PULMONARY SVC/PX: CPT

## 2023-10-15 PROCEDURE — 80053 COMPREHEN METABOLIC PANEL: CPT | Performed by: NURSE PRACTITIONER

## 2023-10-15 PROCEDURE — 63600175 PHARM REV CODE 636 W HCPCS: Performed by: FAMILY MEDICINE

## 2023-10-15 PROCEDURE — 94640 AIRWAY INHALATION TREATMENT: CPT

## 2023-10-15 PROCEDURE — 94761 N-INVAS EAR/PLS OXIMETRY MLT: CPT

## 2023-10-15 PROCEDURE — 99900035 HC TECH TIME PER 15 MIN (STAT)

## 2023-10-15 PROCEDURE — 25000242 PHARM REV CODE 250 ALT 637 W/ HCPCS: Performed by: FAMILY MEDICINE

## 2023-10-15 PROCEDURE — 85007 BL SMEAR W/DIFF WBC COUNT: CPT | Performed by: NURSE PRACTITIONER

## 2023-10-15 PROCEDURE — 25000003 PHARM REV CODE 250: Performed by: FAMILY MEDICINE

## 2023-10-15 RX ORDER — NICOTINE 7MG/24HR
1 PATCH, TRANSDERMAL 24 HOURS TRANSDERMAL DAILY
Qty: 3 PATCH | Refills: 0 | Status: SHIPPED | OUTPATIENT
Start: 2023-10-15

## 2023-10-15 RX ORDER — IPRATROPIUM BROMIDE AND ALBUTEROL SULFATE 2.5; .5 MG/3ML; MG/3ML
3 SOLUTION RESPIRATORY (INHALATION) EVERY 6 HOURS PRN
Qty: 30 ML | Refills: 0 | Status: SHIPPED | OUTPATIENT
Start: 2023-10-15

## 2023-10-15 RX ADMIN — CEFEPIME 2 G: 2 INJECTION, POWDER, FOR SOLUTION INTRAVENOUS at 09:10

## 2023-10-15 RX ADMIN — TICAGRELOR 90 MG: 90 TABLET ORAL at 08:10

## 2023-10-15 RX ADMIN — ATORVASTATIN CALCIUM 80 MG: 40 TABLET, FILM COATED ORAL at 08:10

## 2023-10-15 RX ADMIN — ASPIRIN 81 MG: 81 TABLET, COATED ORAL at 08:10

## 2023-10-15 RX ADMIN — CEFEPIME 2 G: 2 INJECTION, POWDER, FOR SOLUTION INTRAVENOUS at 12:10

## 2023-10-15 RX ADMIN — METOPROLOL TARTRATE 50 MG: 50 TABLET, FILM COATED ORAL at 08:10

## 2023-10-15 RX ADMIN — IPRATROPIUM BROMIDE AND ALBUTEROL SULFATE 3 ML: 2.5; .5 SOLUTION RESPIRATORY (INHALATION) at 02:10

## 2023-10-15 RX ADMIN — LOSARTAN POTASSIUM 25 MG: 25 TABLET, FILM COATED ORAL at 08:10

## 2023-10-15 RX ADMIN — DOXYCYCLINE 100 MG: 100 INJECTION, POWDER, LYOPHILIZED, FOR SOLUTION INTRAVENOUS at 08:10

## 2023-10-15 RX ADMIN — Medication 6 MG: at 12:10

## 2023-10-15 RX ADMIN — BUDESONIDE INHALATION 0.5 MG: 0.5 SUSPENSION RESPIRATORY (INHALATION) at 08:10

## 2023-10-15 RX ADMIN — IPRATROPIUM BROMIDE AND ALBUTEROL SULFATE 3 ML: 2.5; .5 SOLUTION RESPIRATORY (INHALATION) at 08:10

## 2023-10-15 NOTE — DISCHARGE INSTRUCTIONS
You have been started on new medication(s) from this hospitalization. Please take as directed and schedule hospital follow up appointments with your primary providers.    A nurse practitioner may be contacting you to assess your status post-hospitalization.     A referral has been placed on your behalf for pulmonology

## 2023-10-15 NOTE — CONSULTS
SW met with pt at bedside. Pt stated he has nebulizer at home. Pt stated he only needs the albuterol that goes in the machine. SW will update attending MD.

## 2023-10-15 NOTE — PROGRESS NOTES
Discharge education and instructions reviewed with patient. Questions answered as of now, LDA's and Telemetry monitor removed per provider order. Patient remained free of falls during shift. Discharge scripts given to and discussed with patient. Patient discharged with personal belongings with assistance to front of main entrance to leave via personal transportation.

## 2023-10-15 NOTE — PLAN OF CARE
A209/A209 ELIUDRica Hammond is a 48 y.o.male admitted on 10/10/2023 for Sepsis   Code Status: Full Code MRN: [id number]   Review of patient's allergies indicates:  No Known Allergies  Past Medical History:   Diagnosis Date    Coronary artery disease involving native coronary artery of native heart with unstable angina pectoris 8/28/2023    ST elevation myocardial infarction involving right coronary artery 8/28/2023    Tobacco use 8/28/2023      PRN meds    acetaminophen, 650 mg, Q4H PRN  dextromethorphan-guaiFENesin  mg/5 ml, 10 mL, Q4H PRN  dextrose 10%, 12.5 g, PRN  dextrose 10%, 25 g, PRN  glucagon (human recombinant), 1 mg, PRN  glucose, 16 g, PRN  glucose, 24 g, PRN  melatonin, 6 mg, Nightly PRN  ondansetron, 4 mg, Q8H PRN  prochlorperazine, 5 mg, Q6H PRN      AVS Discharge instructions received and reviewed with pt and family at bedside.  Pt voiced understanding and all questions answered to satisfaction.  Stressed importance to making and keeping all follow up appointments.  Tele monitor removed and brought to monitor tech.  IV d/c'd with tip intact, pressure dressing applied.  Pt will call when ready to be transported to front of hospital via w/c to be discharged home.      Orientation: oriented x 4  Julissa Coma Scale Score: 15     Lead Monitored: Lead II Rhythm: normal sinus rhythm    Cardiac/Telemetry Box Number: [id number]  VTE Required Core Measure: Pharmacological prophylaxis initiated/maintained Last Bowel Movement: 10/15/23  Diet Cardiac  Diet Cardiac     Ric Score: 23  Fall Risk Score: 7  Accucheck []   Freq?      Lines/Drains/Airways       Peripheral Intravenous Line  Duration                  Peripheral IV - Single Lumen 10/14/23 2210 20 G Right;Lateral Wrist <1 day                        Problem: Adult Inpatient Plan of Care  Goal: Plan of Care Review  Outcome: Met  Goal: Patient-Specific Goal (Individualized)  Outcome: Met  Goal: Absence of Hospital-Acquired Illness or Injury  Outcome:  Met  Goal: Optimal Comfort and Wellbeing  Outcome: Met  Goal: Readiness for Transition of Care  Outcome: Met     Problem: Adjustment to Illness (Sepsis/Septic Shock)  Goal: Optimal Coping  Outcome: Met     Problem: Bleeding (Sepsis/Septic Shock)  Goal: Absence of Bleeding  Outcome: Met     Problem: Glycemic Control Impaired (Sepsis/Septic Shock)  Goal: Blood Glucose Level Within Desired Range  Outcome: Met     Problem: Infection Progression (Sepsis/Septic Shock)  Goal: Absence of Infection Signs and Symptoms  Outcome: Met     Problem: Nutrition Impaired (Sepsis/Septic Shock)  Goal: Optimal Nutrition Intake  Outcome: Met

## 2023-10-15 NOTE — DISCHARGE SUMMARY
[facility] ([facility])  [facility] Medicine  Discharge Summary      Patient Name: Sawyer Hammond  MRN: [id number]  [facility]: [id number]  Patient Class: IP- Inpatient  Admission Date: 10/10/2023  Hospital Length of Stay: 5 days  Discharge Date and Time:  10/15/2023 10:15 AM  Attending Physician: Laura Gardner MD   Discharging Provider: Laura Gardner MD  Primary Care Provider: Melva Primary Doctor    Primary Care Team: Networked reference to record PCT     HPI:   Sawyer Hammond is a 48 y.o. male with a PMH  has a past medical history of Coronary artery disease involving native coronary artery of native heart with unstable angina pectoris (8/28/2023), ST elevation myocardial infarction involving right coronary artery (8/28/2023), and Tobacco use (8/28/2023).  Presented to the ER for evaluation of generalized weakness and shortness of breath progressively gotten worse since Thursday (10/5/23) patient reports his symptoms initially started with itchy throat and has progressed to fever and other flu-like symptoms.  Denies any improvement with OTC meds.  Denies recent illnesses or sick contacts.  Denies any other symptoms at this time.  ER workup revealed leukocytosis of 16.11,  mg/dL, CPK of 264, procalcitonin level of 0.39 with a CTA of the chest revealed multifocal pneumonia.  Lactic acid, troponin, BNP, flu/COVID, and chest x-ray were all negative for acute findings.  EKG revealed normal sinus rhythm with a ventricular rate of 83 beats per minute with a QT/QTC of 352/413.  Blood cultures obtained x2.  Patient received 2 albuterol breathing treatments, DuoNeb, 1 g Rocephin, 4 mg of Decadron, and 100 mg doxycycline in ED. hospital Medicine consulted to admit patient for sepsis/multifocal pneumonia.  Patient in agreement with treatment plan.  Patient will be admitted under inpatient status.    PCP: Melva Primary Doctor        * No surgery found *      Hospital Course:   10/11 admitted for meeting sirs  criteria. Subjective fever, progressive dyspnea, continues to smoke and drink. Recent mi requiring stent placement. Continue intravenous antibiotic(s), schedule breathing treatments, echocardiogram pending. Incentive spirometer. Does not wear supplemental oxygen at baseline  10/12 continues with hypoxia but states increased work of breathing is patient's norm. States using albuterol nebs at home without a prescription. Does not seek medical care at baseline. Continue antibiotic(s) and add pulmicort. Echocardiogram without pericardial effusion. Eager to go home.  10/13 modest improvement in dyspnea. Repeat chest x-ray pending.   10/14 endorses improvement in symptoms of dyspnea. Leukocytosis persists. Repeat chest x-ray unchanged. Continue current regimen    10/15  No acute events overnight. Patient has weaned off supplemental oxygen. Patient completed 6 days of po and intravenous antibiotic(s). Prescription for neb and neb treatments. Pulmonology referral.    Leukocytosis trending down. On review of prior labs, leukocytosis is chronic consistent with smoking history and given clinical improvement with intravenous and po antibiotic(s) for copdx. Procalcitonin mildly elevated. Smoking cessation counseling 3-10 minutes. Offered nicotine transdermal and smoking cessation resources.    Patient previously admitted for stemi requiring pci and stent placement. During this admission, electrocardiography without st-t wave abnormalities. Echocardiogram without pericardial effusion to explain dyspnea. Advised to follow up with cardiology outpatient.     On exam, no acute distress. No respiratory distress, on room air. +anxiety. Lungs clear on auscultation bilateral, mild wheezing, good air movement.    Patient seen and evaluated by me. Patient was determined to be suitable for discharge. Patient deemed stable for discharge to home with nurse practitioner to visit home program to monitor respiratory status and smoking cessation  efforts.          Goals of Care Treatment Preferences:  Code Status: Full Code      Consults:   Consults (From admission, onward)        Status Ordering Provider     Inpatient consult to Social Work/Case Management  Once        Provider:  (Not yet assigned)    Completed MIREILLE FORD     Inpatient consult to Social Work/Case Management  Once        Provider:  (Not yet assigned)    Completed MIREILLE FORD     Inpatient consult to Social Work/Case Management  Once        Provider:  (Not yet assigned)    Completed MIREILLE FORD          No new Assessment & Plan notes have been filed under this hospital service since the last note was generated.  Service: Hospital Medicine    Final Active Diagnoses:    Diagnosis Date Noted POA    Leukocytosis [D72.829] 10/15/2023 Yes    Coronary artery disease involving native coronary artery of native heart with unstable angina pectoris [I25.110] 08/28/2023 Yes    Tobacco dependence [F17.200] 08/28/2023 Yes      Problems Resolved During this Admission:    Diagnosis Date Noted Date Resolved POA    PRINCIPAL PROBLEM:  Sepsis [A41.9] 10/10/2023 10/15/2023 Yes    Acute hypoxemic respiratory failure [J96.01] 10/12/2023 10/15/2023 Yes    Acute on chronic diastolic heart failure [I50.33] 10/12/2023 10/15/2023 Yes       Discharged Condition: stable    Disposition:     Follow Up:   Follow-up Information     Your Primary Care Provider. Schedule an appointment as soon as possible for a visit in 3 day(s).    Why: hospital follow up           Salud Hastings NP. Schedule an appointment as soon as possible for a visit in 1 week(s).    Specialty: Cardiology  Why: hospital follow up  Contact information:  [address]  [place] [address]  [phone]                       Patient Instructions:      OXYGEN FOR HOME USE     Order Specific Question Answer Comments   Liter Flow 2    Duration With activity    Qualifying Test Performed at: Activity    Oxygen saturation at rest 90   "  Oxygen saturation with activity 88    Oxygen saturation with activity on oxygen 93    Portable mode: continuous    Route nasal cannula    Device: home concentrator with portable tanks    Length of need (in months): 3 mos    Patient condition with qualifying saturation CHF    Height: 5' 10" (1.778 m)    Weight: 86.2 kg (190 lb)    Alternative treatment measures have been tried or considered and deemed clinically ineffective. Yes      NEBULIZER KIT (SUPPLIES) FOR HOME USE     Order Specific Question Answer Comments   Height: 5' 10" (1.778 m)    Weight: 88.2 kg (194 lb 7.1 oz)    Does patient have medical equipment at home? nebulizer    Length of need (1-99 months): 99    Mask or Mouthpiece? Mouthpiece      Ambulatory referral/consult to [facility]   Standing Status: Future   Referral Priority: Routine Referral Type: Consultation   Referral Reason: Specialty Services Required   Number of Visits Requested: 1     Ambulatory referral/consult to Pulmonology   Standing Status: Future   Referral Priority: Routine Referral Type: Consultation   Referral Reason: Specialty Services Required   Referred to Provider: ASHLY MOSQUERA Requested Specialty: Pulmonary Disease   Number of Visits Requested: 1     Ambulatory referral/consult to Smoking Cessation Program   Standing Status: Future   Referral Priority: Routine Referral Type: Consultation   Referral Reason: Specialty Services Required   Requested Specialty: CTTS   Number of Visits Requested: 1     Diet Cardiac     Activity as tolerated       Significant Diagnostic Studies: Labs:   CMP   Recent Labs   Lab 10/14/23  0503 10/15/23  0404    136   K 4.1 4.0   CL 98 98   CO2 29 27   * 132*   BUN 22* 20   CREATININE 1.0 1.1   CALCIUM 10.0 9.3   PROT 8.4 7.7   ALBUMIN 3.5 3.3*   BILITOT 0.4 0.2   ALKPHOS 93 86   AST 27 24   ALT 76* 66*   ANIONGAP 12 11   , CBC   Recent Labs   Lab 10/14/23  0503 10/15/23  0404   WBC 17.97* 17.76*   HGB " "16.0 15.3   HCT 50.2 46.9    331   , Lipid Panel   Lab Results   Component Value Date    CHOL 190 08/29/2023    HDL 34 (L) 08/29/2023    LDLCALC 117.6 08/29/2023    TRIG 192 (H) 08/29/2023    CHOLHDL 17.9 (L) 08/29/2023   , Troponin   Recent Labs   Lab 10/10/23  1543   TROPONINI <0.006   , A1C: No results for input(s): "HGBA1C" in the last 4320 hours. and All labs within the past 24 hours have been reviewed  Microbiology:   Blood Culture   Lab Results   Component Value Date    LABBLOO No Growth to date 10/10/2023    LABBLOO No Growth to date 10/10/2023    LABBLOO No Growth to date 10/10/2023    LABBLOO No Growth to date 10/10/2023    LABBLOO No Growth to date 10/10/2023   , Sputum Culture   Lab Results   Component Value Date    GSRESP <10 epithelial cells per low power field. 10/11/2023    GSRESP Moderate WBC's 10/11/2023    GSRESP Rare Gram negative rods 10/11/2023    GSRESP Rare Gram positive rods 10/11/2023    GSRESP Rare Gram positive cocci 10/11/2023    RESPIRATORYC Normal respiratory reza 10/11/2023    RESPIRATORYC Normal respiratory reza 10/11/2023    and influenza negative   Radiology: X-Ray: CXR: X-Ray Chest 1 View (CXR):   Results for orders placed or performed during the hospital encounter of 10/10/23   X-Ray Chest 1 View    Narrative    EXAMINATION:  XR CHEST 1 VIEW    CLINICAL HISTORY:  Cough, unspecified    FINDINGS:  Single view of the chest.  Comparison 08/28/2023    Cardiac silhouette is normal.  The lungs demonstrate no evidence of active disease.  No evidence of pleural effusion or pneumothorax.  Bones appear intact.      Impression    No acute process seen.      Electronically signed by: Gibran Bey MD  Date:    10/10/2023  Time:    15:28    and portable  CT scan: CTA chest noncoronary  Cardiac Graphics: ECG: nsr and Echocardiogram:   Transthoracic echo (TTE) complete (Cupid Only):   Results for orders placed or performed during the hospital encounter of 10/10/23   Echo   Result Value " Ref Range    BSA 2.06 m2    LVOT stroke volume 82.33 cm3    LVIDd 4.54 3.5 - 6.0 cm    LV Systolic Volume 33.37 mL    LV Systolic Volume Index 16.4 mL/m2    LVIDs 2.94 2.1 - 4.0 cm    LV Diastolic Volume 94.18 mL    LV Diastolic Volume Index 46.17 mL/m2    IVS 1.30 (A) 0.6 - 1.1 cm    LVOT diameter 1.96 cm    LVOT area 3.0 cm2    FS 35 28 - 44 %    Left Ventricle Relative Wall Thickness 0.45 cm    Posterior Wall 1.03 0.6 - 1.1 cm    LV mass 192.51 g    LV Mass Index 94 g/m2    MV Peak E Shaquille 0.75 m/s    TDI LATERAL 0.12 m/s    TDI SEPTAL 0.13 m/s    E/E' ratio 6.00 m/s    MV Peak A Shaquille 0.78 m/s    TR Max Shaquille 1.67 m/s    E/A ratio 0.96     IVRT 79.92 msec    E wave deceleration time 163.21 msec    LV SEPTAL E/E' RATIO 5.77 m/s    LV LATERAL E/E' RATIO 6.25 m/s    LVOT peak shaquille 1.21 m/s    Left Ventricular Outflow Tract Mean Velocity 0.97 cm/s    Left Ventricular Outflow Tract Mean Gradient 3.87 mmHg    LA size 3.24 cm    Left Atrium Minor Axis 4.37 cm    Left Atrium Major Axis 4.48 cm    RVOT peak VTI 10.4 cm    TAPSE 2.31 cm    RA Major Axis 3.50 cm    AV mean gradient 5 mmHg    AV peak gradient 9 mmHg    Ao peak shaquille 1.46 m/s    Ao VTI 30.40 cm    LVOT peak VTI 27.30 cm    AV valve area 2.71 cm²    AV Velocity Ratio 0.83     AV index (prosthetic) 0.90     SUSY by Velocity Ratio 2.50 cm²    MV stenosis pressure 1/2 time 47.33 ms    MV valve area p 1/2 method 4.65 cm2    Triscuspid Valve Regurgitation Peak Gradient 11 mmHg    PV mean gradient 1 mmHg    RVOT peak shaquille 0.56 m/s    Ao root annulus 2.94 cm    STJ 2.92 cm    Ascending aorta 2.81 cm    IVC diameter 1.76 cm    Mean e' 0.13 m/s    ZLVIDS -1.87     ZLVIDD -2.92     LA Volume Index 17.3 mL/m2    LA volume 35.34 cm3    LA WIDTH 2.9 cm    RA Width 2.7 cm    TV resting pulmonary artery pressure 14 mmHg    RV TB RVSP 5 mmHg    Est. RA pres 3 mmHg    EF 55 %    Narrative      Left Ventricle: The left ventricle is normal in size. Normal wall   thickness. There is  concentric remodeling. Normal wall motion. There is   normal systolic function with a visually estimated ejection fraction of 55   - 70%. Ejection fraction by visual approximation is 55%. There is normal   diastolic function.    Right Ventricle: Normal right ventricular cavity size. Wall thickness   is normal. Right ventricle wall motion  is normal. Systolic function is   normal.    IVC/SVC: Normal venous pressure at 3 mmHg.         Pending Diagnostic Studies:     None         Medications:  Reconciled Home Medications:      Medication List      START taking these medications    albuterol-ipratropium 2.5 mg-0.5 mg/3 mL nebulizer solution  Commonly known as: DUO-NEB  Take 3 mLs by nebulization every 6 (six) hours as needed for Wheezing. Rescue     nicotine 7 mg/24 hr  Commonly known as: NICODERM CQ  Place 1 patch onto the skin once daily.        CONTINUE taking these medications    aspirin 81 MG EC tablet  Commonly known as: ECOTRIN  Take 1 tablet (81 mg total) by mouth once daily.     atorvastatin 80 MG tablet  Commonly known as: LIPITOR  Take 1 tablet (80 mg total) by mouth once daily.     BRILINTA 90 mg tablet  Generic drug: ticagrelor  Take 1 tablet (90 mg total) by mouth 2 (two) times daily.     losartan 25 MG tablet  Commonly known as: COZAAR  Take 1 tablet (25 mg total) by mouth once daily.     metoprolol tartrate 50 MG tablet  Commonly known as: LOPRESSOR  Take 1 tablet (50 mg total) by mouth 2 (two) times daily.            Indwelling Lines/Drains at time of discharge:   Lines/Drains/Airways     None                 Time spent on the discharge of patient: 51 minutes         Laura Gardner MD  Department of Hospital Medicine  [facility] - Telemetry ([facility])

## 2023-10-15 NOTE — PLAN OF CARE
[facility] - Telemetry (Hospital)  Discharge Final Note    Primary Care Provider: No, Primary Doctor    Expected Discharge Date: 10/15/2023    Final Discharge Note (most recent)       Final Note - 10/15/23 0928          Final Note    Assessment Type Final Discharge Note     Anticipated Discharge Disposition Home or Self Care        Post-Acute Status    Discharge Delays None known at this time                     Important Message from Medicare             Contact Info       Your Primary Care Provider        Next Steps: Schedule an appointment as soon as possible for a visit in 3 day(s)    Instructions: hospital follow up    Salud Hastings NP   Specialty: Cardiology    [address]  [place] [address]   Phone: [phone]       Next Steps: Schedule an appointment as soon as possible for a visit in 1 week(s)    Instructions: hospital follow up

## 2023-10-16 LAB
BACTERIA SPEC AEROBE CULT: NORMAL
BACTERIA SPEC AEROBE CULT: NORMAL
GRAM STN SPEC: NORMAL

## 2023-10-17 ENCOUNTER — PES CALL (OUTPATIENT)
Dept: HOME HEALTH SERVICES | Facility: CLINIC | Age: 48
End: 2023-10-17
Payer: MEDICAID

## 2023-10-18 ENCOUNTER — PES CALL (OUTPATIENT)
Dept: HOME HEALTH SERVICES | Facility: CLINIC | Age: 48
End: 2023-10-18
Payer: MEDICAID

## 2023-10-19 ENCOUNTER — PES CALL (OUTPATIENT)
Dept: HOME HEALTH SERVICES | Facility: CLINIC | Age: 48
End: 2023-10-19
Payer: MEDICAID

## 2023-10-19 ENCOUNTER — OFFICE VISIT (OUTPATIENT)
Dept: HOME HEALTH SERVICES | Facility: CLINIC | Age: 48
End: 2023-10-19
Payer: MEDICAID

## 2023-10-19 VITALS
SYSTOLIC BLOOD PRESSURE: 97 MMHG | OXYGEN SATURATION: 91 % | HEART RATE: 74 BPM | DIASTOLIC BLOOD PRESSURE: 67 MMHG | RESPIRATION RATE: 18 BRPM

## 2023-10-19 DIAGNOSIS — I25.110 CORONARY ARTERY DISEASE INVOLVING NATIVE CORONARY ARTERY OF NATIVE HEART WITH UNSTABLE ANGINA PECTORIS: ICD-10-CM

## 2023-10-19 DIAGNOSIS — J96.01 ACUTE HYPOXEMIC RESPIRATORY FAILURE: ICD-10-CM

## 2023-10-19 DIAGNOSIS — F17.200 TOBACCO DEPENDENCE: ICD-10-CM

## 2023-10-19 DIAGNOSIS — R06.2 WHEEZING: ICD-10-CM

## 2023-10-19 DIAGNOSIS — Z95.5 S/P CORONARY ARTERY STENT PLACEMENT: ICD-10-CM

## 2023-10-19 DIAGNOSIS — Z09 HOSPITAL DISCHARGE FOLLOW-UP: Primary | ICD-10-CM

## 2023-10-19 PROCEDURE — 4010F ACE/ARB THERAPY RXD/TAKEN: CPT | Mod: CPTII,S$GLB,,

## 2023-10-19 PROCEDURE — 99349 PR HOME VISIT,ESTAB PATIENT,LEVEL III: ICD-10-PCS | Mod: S$GLB,,,

## 2023-10-19 PROCEDURE — 4010F PR ACE/ARB THEARPY RXD/TAKEN: ICD-10-PCS | Mod: CPTII,S$GLB,,

## 2023-10-19 PROCEDURE — 99349 HOME/RES VST EST MOD MDM 40: CPT | Mod: S$GLB,,,

## 2023-10-19 PROCEDURE — 1159F MED LIST DOCD IN RCRD: CPT | Mod: CPTII,S$GLB,,

## 2023-10-19 PROCEDURE — 3078F DIAST BP <80 MM HG: CPT | Mod: CPTII,S$GLB,,

## 2023-10-19 PROCEDURE — 1160F RVW MEDS BY RX/DR IN RCRD: CPT | Mod: CPTII,S$GLB,,

## 2023-10-19 PROCEDURE — 1160F PR REVIEW ALL MEDS BY PRESCRIBER/CLIN PHARMACIST DOCUMENTED: ICD-10-PCS | Mod: CPTII,S$GLB,,

## 2023-10-19 PROCEDURE — 1159F PR MEDICATION LIST DOCUMENTED IN MEDICAL RECORD: ICD-10-PCS | Mod: CPTII,S$GLB,,

## 2023-10-19 PROCEDURE — 1111F DSCHRG MED/CURRENT MED MERGE: CPT | Mod: CPTII,S$GLB,,

## 2023-10-19 PROCEDURE — 3074F PR MOST RECENT SYSTOLIC BLOOD PRESSURE < 130 MM HG: ICD-10-PCS | Mod: CPTII,S$GLB,,

## 2023-10-19 PROCEDURE — 3074F SYST BP LT 130 MM HG: CPT | Mod: CPTII,S$GLB,,

## 2023-10-19 PROCEDURE — 3078F PR MOST RECENT DIASTOLIC BLOOD PRESSURE < 80 MM HG: ICD-10-PCS | Mod: CPTII,S$GLB,,

## 2023-10-19 PROCEDURE — 1111F PR DISCHARGE MEDS RECONCILED W/ CURRENT OUTPATIENT MED LIST: ICD-10-PCS | Mod: CPTII,S$GLB,,

## 2023-10-19 RX ORDER — METOPROLOL TARTRATE 50 MG/1
50 TABLET ORAL 2 TIMES DAILY
Qty: 60 TABLET | Refills: 11 | Status: SHIPPED | OUTPATIENT
Start: 2023-10-19 | End: 2024-01-24

## 2023-10-19 RX ORDER — ALBUTEROL SULFATE 90 UG/1
2 AEROSOL, METERED RESPIRATORY (INHALATION) EVERY 6 HOURS PRN
Qty: 18 G | Refills: 0 | Status: SHIPPED | OUTPATIENT
Start: 2023-10-19 | End: 2024-10-18

## 2023-10-19 NOTE — PROGRESS NOTES
[facility]  Transitional Care Management (TCM) Home Visit    Encounter Provider: Jonathon Schwartz   PCP: Melva, Primary Doctor  Consult Requested By: Dr. Laura Gardner  Admit Date: 10/10/23   IP Discharge Date: 10/15/23  Hospital Length of Stay:RRHLOS@ days  Days since discharge (from IP or SNF): 4   [facility] Contact Note: 10/19  Hospital Diagnosis: Acute hypoxemic respiratory failure [J96.01]     HISTORY OF PRESENT ILLNESS      Patient ID: Sawyer Hammond is a 48 y.o. male was recently admitted to the hospital, this is their TCM encounter.    Hospital Course Synopsis:    Sawyer Hammond is a 48 y.o. male with a PMH  has a past medical history of Coronary artery disease involving native coronary artery of native heart with unstable angina pectoris (8/28/2023), ST elevation myocardial infarction involving right coronary artery (8/28/2023), and Tobacco use (8/28/2023).  Presented to the ER for evaluation of generalized weakness and shortness of breath progressively gotten worse since Thursday (10/5/23) patient reports his symptoms initially started with itchy throat and has progressed to fever and other flu-like symptoms.  Denies any improvement with OTC meds.  Denies recent illnesses or sick contacts.  Denies any other symptoms at this time.  ER workup revealed leukocytosis of 16.11,  mg/dL, CPK of 264, procalcitonin level of 0.39 with a CTA of the chest revealed multifocal pneumonia.  Lactic acid, troponin, BNP, flu/COVID, and chest x-ray were all negative for acute findings.  EKG revealed normal sinus rhythm with a ventricular rate of 83 beats per minute with a QT/QTC of 352/413.  Blood cultures obtained x2.  Patient received 2 albuterol breathing treatments, DuoNeb, 1 g Rocephin, 4 mg of Decadron, and 100 mg doxycycline in ED. hospital Medicine consulted to admit patient for sepsis/multifocal pneumonia.  Patient in agreement with treatment plan.  Patient will be admitted under inpatient  status.     Hospital Course:   10/11 admitted for meeting sirs criteria. Recent mi requiring stent placement. Continue intravenous antibiotic(s),   10/12 Echocardiogram without pericardial effusion.   10/15  Patient completed 6 days of po and intravenous antibiotic(s). Prescription for neb and neb treatments. Pulmonology referral.      Patient previously admitted for stemi requiring pci and stent placement. During this admission, electrocardiography without st-t wave abnormalities. Echocardiogram without pericardial effusion to explain dyspnea. Advised to follow up with cardiology outpatient.     DECISION MAKING TODAY       Assessment & Plan:  1. Hospital discharge follow-up    2. Acute hypoxemic respiratory failure  Comments:  improved since discharge  Not on home oxygen   Reports breathing is much better  Albuterol prn  Continue current medication  F/U with PCP  Orders:  -     Ambulatory referral/consult to [facility]    3. Coronary artery disease involving native coronary artery of native heart with unstable angina pectoris  Assessment & Plan:  S/p stent placement  Currently controlled with brilinta, ASA, Statin  Refills sent to pharmacy  F/U with cardiology    Orders:  -     metoprolol tartrate (LOPRESSOR) 50 MG tablet; Take 1 tablet (50 mg total) by mouth 2 (two) times daily.  Dispense: 60 tablet; Refill: 11  -     ticagrelor (BRILINTA) 90 mg tablet; Take 1 tablet (90 mg total) by mouth 2 (two) times daily.  Dispense: 60 tablet; Refill: 11    4. S/P coronary artery stent placement  Assessment & Plan:  Stable  Continue current medication    Orders:  -     metoprolol tartrate (LOPRESSOR) 50 MG tablet; Take 1 tablet (50 mg total) by mouth 2 (two) times daily.  Dispense: 60 tablet; Refill: 11  -     ticagrelor (BRILINTA) 90 mg tablet; Take 1 tablet (90 mg total) by mouth 2 (two) times daily.  Dispense: 60 tablet; Refill: 11    5. Wheezing  Assessment & Plan:  Improved since  discharge  Albuterol nebs prn  Monitor    Orders:  -     albuterol (VENTOLIN HFA) 90 mcg/actuation inhaler; Inhale 2 puffs into the lungs every 6 (six) hours as needed for Wheezing. Rescue  Dispense: 18 g; Refill: 0    6. Tobacco dependence  Assessment & Plan:  Smoking Cessation  A comprehensive educational session of less than 10 minutes in duration was held with the patient to discuss current use of products containing nicotine and its detrimental impact on his/her health. The patient reports willingness to quit but has failed in the past. Has nicotine patches at home but not currently using. Has decreased tobacco use recently.               Medication List on Discharge:     Medication List            Accurate as of October 19, 2023  3:47 PM. If you have any questions, ask your nurse or doctor.                START taking these medications      albuterol 90 mcg/actuation inhaler  Commonly known as: VENTOLIN HFA  Inhale 2 puffs into the lungs every 6 (six) hours as needed for Wheezing. Rescue  Started by: Jonathon Schwartz NP            CONTINUE taking these medications      albuterol-ipratropium 2.5 mg-0.5 mg/3 mL nebulizer solution  Commonly known as: DUO-NEB  Take 3 mLs by nebulization every 6 (six) hours as needed for Wheezing. Rescue     aspirin 81 MG EC tablet  Commonly known as: ECOTRIN  Take 1 tablet (81 mg total) by mouth once daily.     atorvastatin 80 MG tablet  Commonly known as: LIPITOR  Take 1 tablet (80 mg total) by mouth once daily.     losartan 25 MG tablet  Commonly known as: COZAAR  Take 1 tablet (25 mg total) by mouth once daily.     metoprolol tartrate 50 MG tablet  Commonly known as: LOPRESSOR  Take 1 tablet (50 mg total) by mouth 2 (two) times daily.     nicotine 7 mg/24 hr  Commonly known as: NICODERM CQ  Place 1 patch onto the skin once daily.     ticagrelor 90 mg tablet  Commonly known as: BRILINTA  Take 1 tablet (90 mg total) by mouth 2 (two) times daily.              Medication  Reconciliation:  Were medications changed on discharge? Yes  Were medications in the home? Yes  Is the patient taking the medications as directed? Yes  Does the patient understand the medications and changes? Yes  Does updated med list accurately reflects meds patient is currently taking? Yes    ENVIRONMENT OF CARE      Family and/or Caregiver present at visit?  No  Name of Caregiver: Donovan  History provided by: patient    Advance Care Planning   Advanced Care Planning Status:  Patient has had an ACP conversation  Living Will: No  Power of : No  [place]: No    Does Caregiver have HCPoA: No  Changes today: Doing better since hospital discharge. Continue current medication. Albuterol prn wheezing. Medications sent to pharmacy. F/U with cardiology. Getting established with [facility] next 1-2 weeks.        Impression upon entering the home:  Physical Dwelling: single family home   Appearance of home environment: cleaniness: clean, walking pathways: clear, lighting: adequate, and home structure: sound structure  Functional Status: independent  Mobility: ambulatory  Nutritional access: adequate intake and access  Home Health: No, and does not need it at this time   DME/Supplies: oxygen     Diagnostic tests reviewed/disposition: No diagnosic tests pending after this hospitalization.  Disease/illness education: Take all medication as prescribed. Activity as tolerated. Keep all upcoming appts.   Establishment or re-establishment of referral orders for community resources: No other necessary community resources.   Discussion with other health care providers: No discussion with other health care providers necessary.   Does patient have a PCP at [place]? No   Repatriation plan with PCP? follow-up with PCP within 30d   Does patient have an ostomy (ileostomy, colostomy, suprapubic catheter, nephrostomy tube, tracheostomy, PEG tube, pleurex catheter, cholecystostomy, etc)? No  Were BPAs reviewed?  Yes    Social History     Socioeconomic History    Marital status:    Tobacco Use    Smoking status: Never    Smokeless tobacco: Never   Substance and Sexual Activity    Drug use: Never         OBJECTIVE:     Vital Signs:  Vitals:    10/19/23 1344   BP: 97/67   Pulse: 74   Resp: 18       Review of Systems   Constitutional: Negative.    HENT: Negative.     Eyes: Negative.    Respiratory:  Positive for cough, shortness of breath and wheezing. Negative for chest tightness.    Cardiovascular: Negative.  Negative for leg swelling.   Gastrointestinal: Negative.    Endocrine: Negative.    Genitourinary: Negative.    Musculoskeletal: Negative.    Skin: Negative.    Allergic/Immunologic: Negative.    Neurological: Negative.    Hematological: Negative.    Psychiatric/Behavioral: Negative.  Negative for agitation.    All other systems reviewed and are negative.      Physical Exam:  Physical Exam  Vitals reviewed.   Constitutional:       General: He is not in acute distress.     Appearance: Normal appearance. He is well-developed.   HENT:      Head: Normocephalic and atraumatic.      Nose: Nose normal.      Mouth/Throat:      Mouth: Mucous membranes are dry.      Pharynx: Oropharynx is clear.   Eyes:      Pupils: Pupils are equal, round, and reactive to light.   Cardiovascular:      Rate and Rhythm: Normal rate and regular rhythm.      Pulses: Normal pulses.      Heart sounds: Normal heart sounds.   Pulmonary:      Effort: Pulmonary effort is normal.      Breath sounds: Wheezing present.   Abdominal:      General: Bowel sounds are normal.      Palpations: Abdomen is soft.   Musculoskeletal:         General: Normal range of motion.      Cervical back: Normal range of motion and neck supple.   Skin:     General: Skin is warm and dry.   Neurological:      General: No focal deficit present.      Mental Status: He is alert and oriented to person, place, and time. Mental status is at baseline.   Psychiatric:         Mood and  Affect: Mood normal.         Behavior: Behavior normal.         Thought Content: Thought content normal.         Judgment: Judgment normal.         INSTRUCTIONS FOR PATIENT:   - Continue all medications, treatments and therapies as ordered.   - Follow all instructions, recommendations as discussed.  - Maintain Safety Precautions at all times.  - Attend all medical appointments as scheduled.  - For worsening symptoms: call Primary Care Physician or Nurse Practitioner.  - For emergencies, call 911 or immediately report to the nearest emergency room.   Scheduled Follow-up, Appts Reviewed with Modifications if Needed: Yes  Future Appointments   Date Time Provider Department Center   1/3/2024  2:40 PM Leola Dodson MD [facility]   1/8/2024  1:30 PM Tia Wu, RRT [facility]       Signature: Jonathon Schwartz NP    Transition of Care Visit:  I have reviewed and updated the history and problem list.  I have reconciled the medication list.  I have discussed the hospitalization and current medical issues, prognosis and plans with the patient/family.

## 2023-10-19 NOTE — ASSESSMENT & PLAN NOTE
Smoking Cessation  A comprehensive educational session of less than 10 minutes in duration was held with the patient to discuss current use of products containing nicotine and its detrimental impact on his/her health. The patient reports willingness to quit but has failed in the past. Has nicotine patches at home but not currently using. Has decreased tobacco use recently.

## 2023-10-19 NOTE — ASSESSMENT & PLAN NOTE
S/p stent placement  Currently controlled with brilinta, ASA, Statin  Refills sent to pharmacy  F/U with cardiology

## 2023-11-27 PROBLEM — I21.11 ST ELEVATION MYOCARDIAL INFARCTION INVOLVING RIGHT CORONARY ARTERY: Status: RESOLVED | Noted: 2023-08-28 | Resolved: 2023-11-27

## 2024-01-04 ENCOUNTER — TELEPHONE (OUTPATIENT)
Dept: CARDIOLOGY | Facility: CLINIC | Age: 49
End: 2024-01-04
Payer: MEDICAID

## 2024-01-04 NOTE — TELEPHONE ENCOUNTER
Spoke to patient and r/s to 1/24 at 1220----- Message from Luisana Lowery sent at 1/4/2024  2:37 PM CST -----  Type:  Appointment Request    Name of Caller:ALFREDO CHERRY [[id number]]  When is the first available appointment?No access  Symptoms:follow up   Would the patient rather a call back or a response via [facility]? Call back   Best Call Back Number:[phone]  Additional Information: Patient indicates he missed an appointment with the provider 01/3. Patient  indicates he would like to reschedule his appointment for the soonest appointment available. Patient indicates he would like a call back with further assistance and more information.

## 2024-01-24 ENCOUNTER — OFFICE VISIT (OUTPATIENT)
Dept: CARDIOLOGY | Facility: CLINIC | Age: 49
End: 2024-01-24
Payer: MEDICAID

## 2024-01-24 VITALS
HEART RATE: 85 BPM | DIASTOLIC BLOOD PRESSURE: 84 MMHG | WEIGHT: 201.06 LBS | BODY MASS INDEX: 28.78 KG/M2 | OXYGEN SATURATION: 92 % | HEIGHT: 70 IN | SYSTOLIC BLOOD PRESSURE: 116 MMHG

## 2024-01-24 DIAGNOSIS — R06.2 WHEEZING: ICD-10-CM

## 2024-01-24 DIAGNOSIS — Z95.5 S/P CORONARY ARTERY STENT PLACEMENT: ICD-10-CM

## 2024-01-24 DIAGNOSIS — F17.200 TOBACCO DEPENDENCE: ICD-10-CM

## 2024-01-24 DIAGNOSIS — I25.110 CORONARY ARTERY DISEASE INVOLVING NATIVE CORONARY ARTERY OF NATIVE HEART WITH UNSTABLE ANGINA PECTORIS: Primary | ICD-10-CM

## 2024-01-24 PROCEDURE — 3079F DIAST BP 80-89 MM HG: CPT | Mod: CPTII,,, | Performed by: INTERNAL MEDICINE

## 2024-01-24 PROCEDURE — 99213 OFFICE O/P EST LOW 20 MIN: CPT | Mod: S$PBB,,, | Performed by: INTERNAL MEDICINE

## 2024-01-24 PROCEDURE — 3074F SYST BP LT 130 MM HG: CPT | Mod: CPTII,,, | Performed by: INTERNAL MEDICINE

## 2024-01-24 PROCEDURE — 1160F RVW MEDS BY RX/DR IN RCRD: CPT | Mod: CPTII,,, | Performed by: INTERNAL MEDICINE

## 2024-01-24 PROCEDURE — 99999 PR PBB SHADOW E&M-EST. PATIENT-LVL III: CPT | Mod: PBBFAC,,, | Performed by: INTERNAL MEDICINE

## 2024-01-24 PROCEDURE — 1159F MED LIST DOCD IN RCRD: CPT | Mod: CPTII,,, | Performed by: INTERNAL MEDICINE

## 2024-01-24 PROCEDURE — 99213 OFFICE O/P EST LOW 20 MIN: CPT | Mod: PBBFAC | Performed by: INTERNAL MEDICINE

## 2024-01-24 PROCEDURE — 3008F BODY MASS INDEX DOCD: CPT | Mod: CPTII,,, | Performed by: INTERNAL MEDICINE

## 2024-01-24 RX ORDER — METOPROLOL SUCCINATE 25 MG/1
25 TABLET, EXTENDED RELEASE ORAL DAILY
Qty: 30 TABLET | Refills: 11 | Status: SHIPPED | OUTPATIENT
Start: 2024-01-24

## 2024-01-24 RX ORDER — ATORVASTATIN CALCIUM 80 MG/1
TABLET, FILM COATED ORAL
Qty: 90 TABLET | Refills: 1 | Status: SHIPPED | OUTPATIENT
Start: 2024-01-24

## 2024-01-24 RX ORDER — LOSARTAN POTASSIUM 25 MG/1
25 TABLET ORAL DAILY
Qty: 90 TABLET | Refills: 3 | Status: SHIPPED | OUTPATIENT
Start: 2024-01-24 | End: 2025-01-23

## 2024-01-24 NOTE — PROGRESS NOTES
Subjective:   Patient ID:  Sawyer Hammond is a 48 y.o. male who presents for follow up of No chief complaint on file.      HPI  9/12/2023 Salud Hastings NP   47y/o M with PMHX of CAD s/p STEMI with PCI RCA with Dr. Dodson, tobaccos abuse here today for HFU. Reports he is sometimes taking his Brilinta and sometimes his ASA. Smokes 1-2ppd of cigarettes. Pt returned to work the day after DC by his own decision. Denies any SOB, dizziness, near syncope. Does reports he trying to do light duty while at work but not having any CP.  He reports some GI side effects since home, diarrhea and upset stomach. Pt reports he is not drinking as much water as he should.      LHC 8/23'  Occluded distal rca treated with thrombectomy and stenting   Non obs left system  Lvf 45-50% inferior akinesis  Lvedp 17     1/24/24   He is smoking he is complaint with meds. Ran out of nicorette    He is only compliant with brilinta and metoprolol does not atke asa and other meds regularily.     Echo in October is normal.  Ct chest negative for pe aortic dissection in October.  No cardiacf symptoms.,  Past Medical History:   Diagnosis Date    Coronary artery disease involving native coronary artery of native heart with unstable angina pectoris 8/28/2023    ST elevation myocardial infarction involving right coronary artery 8/28/2023    Tobacco use 8/28/2023       Past Surgical History:   Procedure Laterality Date    LEFT HEART CATHETERIZATION Left 8/28/2023    Procedure: Left heart cath;  Surgeon: Leola Dodson MD;  Location: [facility] CATH LAB;  Service: Cardiology;  Laterality: Left;    PERCUTANEOUS CORONARY INTERVENTION, ARTERY N/A 8/28/2023    Procedure: Percutaneous coronary intervention;  Surgeon: Leola Dodson MD;  Location: [facility] CATH LAB;  Service: Cardiology;  Laterality: N/A;    STENT, DRUG ELUTING, SINGLE VESSEL, CORONARY  8/28/2023    Procedure: Stent, Drug Eluting, Single Vessel, Coronary;  Surgeon: Leola Dodson MD;  Location:  [facility] CATH LAB;  Service: Cardiology;;    THROMBECTOMY, CORONARY  8/28/2023    Procedure: Thrombectomy, Coronary;  Surgeon: Leola Dodson MD;  Location: [facility] CATH LAB;  Service: Cardiology;;  Using aspiration catheter       Social History     Tobacco Use    Smoking status: Never    Smokeless tobacco: Never   Substance Use Topics    Drug use: Never       Family History   Problem Relation Age of Onset    No Known Problems Mother     No Known Problems Father        Current Outpatient Medications   Medication Sig    albuterol (VENTOLIN HFA) 90 mcg/actuation inhaler Inhale 2 puffs into the lungs every 6 (six) hours as needed for Wheezing. Rescue    albuterol-ipratropium (DUO-NEB) 2.5 mg-0.5 mg/3 mL nebulizer solution Take 3 mLs by nebulization every 6 (six) hours as needed for Wheezing. Rescue    aspirin (ECOTRIN) 81 MG EC tablet Take 1 tablet (81 mg total) by mouth once daily.    atorvastatin (LIPITOR) 80 MG tablet Take 1 tablet every day by oral route at bedtime for 90 days, for Cholesterol.    doxycycline (VIBRA-TABS) 100 MG tablet Take 1 tablet twice a day by oral route with meals for 10 days, for Bronchitis.    losartan (COZAAR) 50 MG tablet Take 1 tablet every day by oral route in the morning for 30 days, for Hypertension.    metoprolol tartrate (LOPRESSOR) 50 MG tablet Take 1 tablet (50 mg total) by mouth 2 (two) times daily.    metoprolol tartrate (LOPRESSOR) 50 MG tablet Take 1 tablet twice a day by oral route for 90 days, for History Heart Attack.    ticagrelor (BRILINTA) 90 mg tablet Take 1 tablet (90 mg total) by mouth 2 (two) times daily.    umeclidinium-vilanteroL (ANORO ELLIPTA) 62.5-25 mcg/actuation DsDv Inhale 1 puff every day by inhalation route in the morning for 30 days, for COPD.    atorvastatin (LIPITOR) 80 MG tablet Take 1 tablet (80 mg total) by mouth once daily.    atorvastatin (LIPITOR) 80 MG tablet Take 1 tablet every day by oral route at bedtime for 90 days, for Cholesterol.    losartan  (COZAAR) 25 MG tablet Take 1 tablet (25 mg total) by mouth once daily.    metoprolol tartrate (LOPRESSOR) 50 MG tablet Take 1 tablet twice a day by oral route for 90 days, for History Heart Attack.    nicotine (NICODERM CQ) 7 mg/24 hr Place 1 patch onto the skin once daily.    umeclidinium-vilanteroL (ANORO ELLIPTA) 62.5-25 mcg/actuation DsDv Inhale 1 puff by inhalation route every day in the morning for 30 days, for COPD.     No current facility-administered medications for this visit.     Current Outpatient Medications on File Prior to Visit   Medication Sig    albuterol (VENTOLIN HFA) 90 mcg/actuation inhaler Inhale 2 puffs into the lungs every 6 (six) hours as needed for Wheezing. Rescue    albuterol-ipratropium (DUO-NEB) 2.5 mg-0.5 mg/3 mL nebulizer solution Take 3 mLs by nebulization every 6 (six) hours as needed for Wheezing. Rescue    aspirin (ECOTRIN) 81 MG EC tablet Take 1 tablet (81 mg total) by mouth once daily.    atorvastatin (LIPITOR) 80 MG tablet Take 1 tablet every day by oral route at bedtime for 90 days, for Cholesterol.    doxycycline (VIBRA-TABS) 100 MG tablet Take 1 tablet twice a day by oral route with meals for 10 days, for Bronchitis.    losartan (COZAAR) 50 MG tablet Take 1 tablet every day by oral route in the morning for 30 days, for Hypertension.    metoprolol tartrate (LOPRESSOR) 50 MG tablet Take 1 tablet (50 mg total) by mouth 2 (two) times daily.    metoprolol tartrate (LOPRESSOR) 50 MG tablet Take 1 tablet twice a day by oral route for 90 days, for History Heart Attack.    ticagrelor (BRILINTA) 90 mg tablet Take 1 tablet (90 mg total) by mouth 2 (two) times daily.    umeclidinium-vilanteroL (ANORO ELLIPTA) 62.5-25 mcg/actuation DsDv Inhale 1 puff every day by inhalation route in the morning for 30 days, for COPD.    atorvastatin (LIPITOR) 80 MG tablet Take 1 tablet (80 mg total) by mouth once daily.    atorvastatin (LIPITOR) 80 MG tablet Take 1 tablet every day by oral route at  bedtime for 90 days, for Cholesterol.    losartan (COZAAR) 25 MG tablet Take 1 tablet (25 mg total) by mouth once daily.    metoprolol tartrate (LOPRESSOR) 50 MG tablet Take 1 tablet twice a day by oral route for 90 days, for History Heart Attack.    nicotine (NICODERM CQ) 7 mg/24 hr Place 1 patch onto the skin once daily.    umeclidinium-vilanteroL (ANORO ELLIPTA) 62.5-25 mcg/actuation DsDv Inhale 1 puff by inhalation route every day in the morning for 30 days, for COPD.     No current facility-administered medications on file prior to visit.       Review of Systems   Constitutional: Negative for malaise/fatigue.   Eyes:  Negative for blurred vision.   Cardiovascular:  Negative for chest pain, claudication, cyanosis, dyspnea on exertion, irregular heartbeat, leg swelling, near-syncope, orthopnea, palpitations and paroxysmal nocturnal dyspnea.   Respiratory:  Negative for cough, hemoptysis and shortness of breath.    Hematologic/Lymphatic: Negative for bleeding problem. Does not bruise/bleed easily.   Skin:  Negative for dry skin and itching.   Musculoskeletal:  Negative for falls, muscle weakness and myalgias.   Gastrointestinal:  Negative for abdominal pain, diarrhea, heartburn, hematemesis, hematochezia and melena.   Genitourinary:  Negative for flank pain and hematuria.   Neurological:  Negative for dizziness, focal weakness, headaches, light-headedness, numbness, paresthesias, seizures and weakness.   Psychiatric/Behavioral:  Negative for altered mental status and memory loss. The patient is not nervous/anxious.    Allergic/Immunologic: Negative for hives.       Objective:   Physical Exam  Vitals and nursing note reviewed.   Constitutional:       General: He is not in acute distress.     Appearance: He is well-developed. He is not diaphoretic.   HENT:      Head: Normocephalic and atraumatic.   Eyes:      General:         Right eye: No discharge.         Left eye: No discharge.      Pupils: Pupils are equal,  "round, and reactive to light.   Neck:      Thyroid: No thyromegaly.      Vascular: No JVD.   Cardiovascular:      Rate and Rhythm: Normal rate and regular rhythm.      Pulses: Intact distal pulses.      Heart sounds: Normal heart sounds. No murmur heard.     No friction rub. No gallop.   Pulmonary:      Effort: Pulmonary effort is normal. No respiratory distress.      Breath sounds: Normal breath sounds. No wheezing or rales.   Chest:      Chest wall: No tenderness.   Abdominal:      General: Bowel sounds are normal. There is no distension.      Palpations: Abdomen is soft.      Tenderness: There is no abdominal tenderness.   Musculoskeletal:         General: Normal range of motion.      Cervical back: Neck supple.   Skin:     General: Skin is warm and dry.      Findings: No erythema or rash.   Neurological:      Mental Status: He is alert and oriented to person, place, and time.      Cranial Nerves: No cranial nerve deficit.   Psychiatric:         Behavior: Behavior normal.       Vitals:    01/24/24 1235 01/24/24 1236   BP: 112/80 116/84   BP Location: Left arm Right arm   Patient Position: Sitting Sitting   BP Method: Large (Manual) Large (Manual)   Pulse: 85    SpO2: (!) 92%    Weight: 91.2 kg (201 lb 1 oz)    Height: 5' 10" (1.778 m)      Lab Results   Component Value Date    CHOL 190 08/29/2023      Body mass index is 28.85 kg/m².   No results found for: "LABA1C", "HGBA1C"   BMP  Lab Results   Component Value Date     10/15/2023    K 4.0 10/15/2023    CL 98 10/15/2023    CO2 27 10/15/2023    BUN 20 10/15/2023    CREATININE 1.1 10/15/2023    CALCIUM 9.3 10/15/2023    ANIONGAP 11 10/15/2023    EGFRNORACEVR >60 10/15/2023      Lab Results   Component Value Date    HDL 34 (L) 08/29/2023     Lab Results   Component Value Date    LDLCALC 117.6 08/29/2023     Lab Results   Component Value Date    TRIG 192 (H) 08/29/2023     Lab Results   Component Value Date    CHOLHDL 17.9 (L) 08/29/2023       Chemistry      "   Component Value Date/Time     10/15/2023 0404    K 4.0 10/15/2023 0404    CL 98 10/15/2023 0404    CO2 27 10/15/2023 0404    BUN 20 10/15/2023 0404    CREATININE 1.1 10/15/2023 0404     (H) 10/15/2023 0404        Component Value Date/Time    CALCIUM 9.3 10/15/2023 0404    ALKPHOS 86 10/15/2023 0404    AST 24 10/15/2023 0404    ALT 66 (H) 10/15/2023 0404    BILITOT 0.2 10/15/2023 0404          Lab Results   Component Value Date    TSH 1.752 08/28/2023     Lab Results   Component Value Date    INR 1.0 08/28/2023     Lab Results   Component Value Date    WBC 17.76 (H) 10/15/2023    HGB 15.3 10/15/2023    HCT 46.9 10/15/2023    MCV 95 10/15/2023     10/15/2023     BMP  Sodium   Date Value Ref Range Status   10/15/2023 136 136 - 145 mmol/L Final     Potassium   Date Value Ref Range Status   10/15/2023 4.0 3.5 - 5.1 mmol/L Final     Chloride   Date Value Ref Range Status   10/15/2023 98 95 - 110 mmol/L Final     CO2   Date Value Ref Range Status   10/15/2023 27 23 - 29 mmol/L Final     BUN   Date Value Ref Range Status   10/15/2023 20 6 - 20 mg/dL Final     Creatinine   Date Value Ref Range Status   10/15/2023 1.1 0.5 - 1.4 mg/dL Final     Calcium   Date Value Ref Range Status   10/15/2023 9.3 8.7 - 10.5 mg/dL Final     Anion Gap   Date Value Ref Range Status   10/15/2023 11 8 - 16 mmol/L Final     CrCl cannot be calculated (Patient's most recent lab result is older than the maximum 7 days allowed.).    Assessment:     1. Coronary artery disease involving native coronary artery of native heart with unstable angina pectoris    2. Tobacco dependence    3. S/P coronary artery stent placement    4. Wheezing      Cad s/p rca stent for stemi asymptomatic he needs to be compliant with meds lipid therapy  he was counseled. Tobacco cessation was discussed. Emphasized the importance of meds compliance for cardioprotection.   I will switch all his meds to once a day to help with compliance.   Plan:   Continue  current therapy  Cardiac low salt diet.  Risk factor modification and excercise program.  Smoking cessation counseling  F/u in 6 months with lipid cmp

## 2024-05-14 DIAGNOSIS — R06.2 WHEEZING: ICD-10-CM

## 2024-05-15 RX ORDER — ALBUTEROL SULFATE 90 UG/1
2 AEROSOL, METERED RESPIRATORY (INHALATION) EVERY 6 HOURS PRN
Qty: 18 G | Refills: 0 | Status: SHIPPED | OUTPATIENT
Start: 2024-05-15 | End: 2025-05-15

## 2024-07-17 ENCOUNTER — OFFICE VISIT (OUTPATIENT)
Dept: CARDIOLOGY | Facility: CLINIC | Age: 49
End: 2024-07-17
Payer: MEDICAID

## 2024-07-17 VITALS
SYSTOLIC BLOOD PRESSURE: 138 MMHG | OXYGEN SATURATION: 95 % | BODY MASS INDEX: 29.76 KG/M2 | HEART RATE: 78 BPM | WEIGHT: 207.88 LBS | DIASTOLIC BLOOD PRESSURE: 90 MMHG | HEIGHT: 70 IN

## 2024-07-17 DIAGNOSIS — R73.01 ELEVATED FASTING BLOOD SUGAR: ICD-10-CM

## 2024-07-17 DIAGNOSIS — Z95.5 S/P CORONARY ARTERY STENT PLACEMENT: ICD-10-CM

## 2024-07-17 DIAGNOSIS — J44.9 CHRONIC OBSTRUCTIVE PULMONARY DISEASE, UNSPECIFIED COPD TYPE: ICD-10-CM

## 2024-07-17 DIAGNOSIS — F17.200 TOBACCO DEPENDENCE: ICD-10-CM

## 2024-07-17 DIAGNOSIS — I25.110 CORONARY ARTERY DISEASE INVOLVING NATIVE CORONARY ARTERY OF NATIVE HEART WITH UNSTABLE ANGINA PECTORIS: Primary | ICD-10-CM

## 2024-07-17 PROCEDURE — 1160F RVW MEDS BY RX/DR IN RCRD: CPT | Mod: CPTII,,, | Performed by: INTERNAL MEDICINE

## 2024-07-17 PROCEDURE — 99215 OFFICE O/P EST HI 40 MIN: CPT | Mod: PBBFAC | Performed by: INTERNAL MEDICINE

## 2024-07-17 PROCEDURE — 3008F BODY MASS INDEX DOCD: CPT | Mod: CPTII,,, | Performed by: INTERNAL MEDICINE

## 2024-07-17 PROCEDURE — 3079F DIAST BP 80-89 MM HG: CPT | Mod: CPTII,,, | Performed by: INTERNAL MEDICINE

## 2024-07-17 PROCEDURE — 4010F ACE/ARB THERAPY RXD/TAKEN: CPT | Mod: CPTII,,, | Performed by: INTERNAL MEDICINE

## 2024-07-17 PROCEDURE — 3075F SYST BP GE 130 - 139MM HG: CPT | Mod: CPTII,,, | Performed by: INTERNAL MEDICINE

## 2024-07-17 PROCEDURE — 1159F MED LIST DOCD IN RCRD: CPT | Mod: CPTII,,, | Performed by: INTERNAL MEDICINE

## 2024-07-17 PROCEDURE — 99999 PR PBB SHADOW E&M-EST. PATIENT-LVL V: CPT | Mod: PBBFAC,,, | Performed by: INTERNAL MEDICINE

## 2024-07-17 PROCEDURE — 99215 OFFICE O/P EST HI 40 MIN: CPT | Mod: S$PBB,,, | Performed by: INTERNAL MEDICINE

## 2024-07-17 RX ORDER — DILTIAZEM HYDROCHLORIDE 180 MG/1
180 CAPSULE, COATED, EXTENDED RELEASE ORAL DAILY
Qty: 30 CAPSULE | Refills: 11 | Status: SHIPPED | OUTPATIENT
Start: 2024-07-17 | End: 2025-07-17

## 2024-07-17 NOTE — PROGRESS NOTES
Subjective:   Patient ID:  Sawyer Hammond is a 49 y.o. male who presents for follow up of Shortness of Breath      HPI  9/12/2023 Salud Hastings NP   47y/o M with PMHX of CAD s/p STEMI with PCI RCA with Dr. Dodson, tobaccos abuse here today for HFU. Reports he is sometimes taking his Brilinta and sometimes his ASA. Smokes 1-2ppd of cigarettes. Pt returned to work the day after DC by his own decision. Denies any SOB, dizziness, near syncope. Does reports he trying to do light duty while at work but not having any CP.  He reports some GI side effects since home, diarrhea and upset stomach. Pt reports he is not drinking as much water as he should.      [facility] 8/23'  Occluded distal rca treated with thrombectomy and stenting   Non obs left system  Lvf 45-50% inferior akinesis  Lvedp 17      1/24/24   He is smoking he is complaint with meds. Ran out of nicorette    He is only compliant with brilinta and metoprolol does not atke asa and other meds regularily.      Echo in October is normal.  Ct chest negative for pe aortic dissection in October.  No cardiacf symptoms.,    7/17/2024   Here for f/u. He is still smoking he has compliance issues taking meds regularily. He ahs ed . He is wheezing. He has no issues cardiac wise.   Has not seen pulmonary he has gained weight his fasting sugar is elevated. He is not compliant with diet.    Past Medical History:   Diagnosis Date    Coronary artery disease involving native coronary artery of native heart with unstable angina pectoris 8/28/2023    ST elevation myocardial infarction involving right coronary artery 8/28/2023    Tobacco use 8/28/2023       Past Surgical History:   Procedure Laterality Date    LEFT HEART CATHETERIZATION Left 8/28/2023    Procedure: Left heart cath;  Surgeon: Leola Dodson MD;  Location: [facility] CATH LAB;  Service: Cardiology;  Laterality: Left;    PERCUTANEOUS CORONARY INTERVENTION, ARTERY N/A 8/28/2023    Procedure: Percutaneous coronary  intervention;  Surgeon: Leola Dodson MD;  Location: [facility] CATH LAB;  Service: Cardiology;  Laterality: N/A;    STENT, DRUG ELUTING, SINGLE VESSEL, CORONARY  8/28/2023    Procedure: Stent, Drug Eluting, Single Vessel, Coronary;  Surgeon: Leola Dodson MD;  Location: [facility] CATH LAB;  Service: Cardiology;;    THROMBECTOMY, CORONARY  8/28/2023    Procedure: Thrombectomy, Coronary;  Surgeon: Leola Dodson MD;  Location: [facility] CATH LAB;  Service: Cardiology;;  Using aspiration catheter       Social History     Tobacco Use    Smoking status: Never    Smokeless tobacco: Never   Substance Use Topics    Drug use: Never       Family History   Problem Relation Name Age of Onset    No Known Problems Mother      No Known Problems Father         Current Outpatient Medications   Medication Sig    albuterol (VENTOLIN HFA) 90 mcg/actuation inhaler Inhale 2 puffs into the lungs every 6 (six) hours as needed for Wheezing. Rescue    albuterol-ipratropium (DUO-NEB) 2.5 mg-0.5 mg/3 mL nebulizer solution Take 3 mLs by nebulization every 6 (six) hours as needed for Wheezing. Rescue    aspirin (ECOTRIN) 81 MG EC tablet Take 1 tablet (81 mg total) by mouth once daily.    atorvastatin (LIPITOR) 80 MG tablet Take 1 tablet (80 mg total) by mouth once daily.    atorvastatin (LIPITOR) 80 MG tablet Take 1 tablet every day by oral route at bedtime for 90 days, for Cholesterol.    atorvastatin (LIPITOR) 80 MG tablet Take 1 tablet every day by oral route at bedtime for 90 days, for Cholesterol.    doxycycline (VIBRA-TABS) 100 MG tablet Take 1 tablet twice a day by oral route with meals for 10 days, for Bronchitis.    losartan (COZAAR) 25 MG tablet Take 1 tablet (25 mg total) by mouth once daily.    metoprolol succinate (TOPROL-XL) 25 MG 24 hr tablet Take 1 tablet (25 mg total) by mouth once daily.    nicotine (NICODERM CQ) 7 mg/24 hr Place 1 patch onto the skin once daily.    ticagrelor (BRILINTA) 90 mg tablet Take 1 tablet (90 mg total) by  mouth 2 (two) times daily.    umeclidinium-vilanteroL (ANORO ELLIPTA) 62.5-25 mcg/actuation DsDv Inhale 1 puff every day by inhalation route in the morning for 30 days, for COPD.    umeclidinium-vilanteroL (ANORO ELLIPTA) 62.5-25 mcg/actuation DsDv Inhale 1 puff by inhalation route every day in the morning for 30 days, for COPD.     No current facility-administered medications for this visit.     Current Outpatient Medications on File Prior to Visit   Medication Sig    albuterol (VENTOLIN HFA) 90 mcg/actuation inhaler Inhale 2 puffs into the lungs every 6 (six) hours as needed for Wheezing. Rescue    albuterol-ipratropium (DUO-NEB) 2.5 mg-0.5 mg/3 mL nebulizer solution Take 3 mLs by nebulization every 6 (six) hours as needed for Wheezing. Rescue    aspirin (ECOTRIN) 81 MG EC tablet Take 1 tablet (81 mg total) by mouth once daily.    atorvastatin (LIPITOR) 80 MG tablet Take 1 tablet (80 mg total) by mouth once daily.    atorvastatin (LIPITOR) 80 MG tablet Take 1 tablet every day by oral route at bedtime for 90 days, for Cholesterol.    atorvastatin (LIPITOR) 80 MG tablet Take 1 tablet every day by oral route at bedtime for 90 days, for Cholesterol.    doxycycline (VIBRA-TABS) 100 MG tablet Take 1 tablet twice a day by oral route with meals for 10 days, for Bronchitis.    losartan (COZAAR) 25 MG tablet Take 1 tablet (25 mg total) by mouth once daily.    metoprolol succinate (TOPROL-XL) 25 MG 24 hr tablet Take 1 tablet (25 mg total) by mouth once daily.    nicotine (NICODERM CQ) 7 mg/24 hr Place 1 patch onto the skin once daily.    ticagrelor (BRILINTA) 90 mg tablet Take 1 tablet (90 mg total) by mouth 2 (two) times daily.    umeclidinium-vilanteroL (ANORO ELLIPTA) 62.5-25 mcg/actuation DsDv Inhale 1 puff every day by inhalation route in the morning for 30 days, for COPD.    umeclidinium-vilanteroL (ANORO ELLIPTA) 62.5-25 mcg/actuation DsDv Inhale 1 puff by inhalation route every day in the morning for 30 days, for  COPD.     No current facility-administered medications on file prior to visit.     Review of patient's allergies indicates:  No Known Allergies   Review of Systems   Constitutional: Negative for malaise/fatigue.   Eyes:  Negative for blurred vision.   Cardiovascular:  Negative for chest pain, claudication, cyanosis, dyspnea on exertion, irregular heartbeat, leg swelling, near-syncope, orthopnea, palpitations and paroxysmal nocturnal dyspnea.   Respiratory:  Positive for wheezing. Negative for cough, hemoptysis and shortness of breath.    Hematologic/Lymphatic: Negative for bleeding problem. Does not bruise/bleed easily.   Skin:  Negative for dry skin and itching.   Musculoskeletal:  Negative for falls, muscle weakness and myalgias.   Gastrointestinal:  Negative for abdominal pain, diarrhea, heartburn, hematemesis, hematochezia and melena.   Genitourinary:  Positive for decreased libido. Negative for flank pain and hematuria.   Neurological:  Negative for dizziness, focal weakness, headaches, light-headedness, numbness, paresthesias, seizures and weakness.   Psychiatric/Behavioral:  Negative for altered mental status and memory loss. The patient is not nervous/anxious.    Allergic/Immunologic: Negative for hives.       Objective:   Physical Exam  Vitals and nursing note reviewed.   Constitutional:       General: He is not in acute distress.     Appearance: He is well-developed. He is not diaphoretic.   HENT:      Head: Normocephalic and atraumatic.   Eyes:      General:         Right eye: No discharge.         Left eye: No discharge.      Pupils: Pupils are equal, round, and reactive to light.   Neck:      Thyroid: No thyromegaly.      Vascular: No JVD.   Cardiovascular:      Rate and Rhythm: Normal rate and regular rhythm.      Pulses: Normal pulses and intact distal pulses.      Heart sounds: Normal heart sounds. No murmur heard.     No friction rub. No gallop.   Pulmonary:      Effort: Pulmonary effort is normal.  "No respiratory distress.      Breath sounds: Wheezing present. No rales.   Chest:      Chest wall: No tenderness.   Abdominal:      General: Bowel sounds are normal. There is no distension.      Palpations: Abdomen is soft.      Tenderness: There is no abdominal tenderness.   Musculoskeletal:         General: Normal range of motion.      Cervical back: Neck supple.      Right lower leg: No edema.      Left lower leg: No edema.   Skin:     General: Skin is warm and dry.      Findings: No erythema or rash.   Neurological:      General: No focal deficit present.      Mental Status: He is alert and oriented to person, place, and time.      Cranial Nerves: No cranial nerve deficit.   Psychiatric:         Mood and Affect: Mood normal.         Behavior: Behavior normal.       Vitals:    07/17/24 1031 07/17/24 1036   BP: 134/84 (!) 138/90   BP Location: Left arm Right arm   Patient Position: Sitting Sitting   BP Method: Small (Manual) Small (Manual)   Pulse: 78 78   SpO2: 95% 95%   Weight: 94.3 kg (207 lb 14.3 oz) 94.3 kg (207 lb 14.3 oz)   Height: 5' 10" (1.778 m) 5' 10" (1.778 m)     Lab Results   Component Value Date    CHOL 190 08/29/2023      Body mass index is 29.83 kg/m².   No results found for: "LABA1C", "HGBA1C"   BMP  Lab Results   Component Value Date     10/15/2023    K 4.0 10/15/2023    CL 98 10/15/2023    CO2 27 10/15/2023    BUN 20 10/15/2023    CREATININE 1.1 10/15/2023    CALCIUM 9.3 10/15/2023    ANIONGAP 11 10/15/2023    EGFRNORACEVR >60 10/15/2023      Lab Results   Component Value Date    HDL 34 (L) 08/29/2023     Lab Results   Component Value Date    LDLCALC 117.6 08/29/2023     Lab Results   Component Value Date    TRIG 192 (H) 08/29/2023     Lab Results   Component Value Date    CHOLHDL 17.9 (L) 08/29/2023       Chemistry        Component Value Date/Time     10/15/2023 0404    K 4.0 10/15/2023 0404    CL 98 10/15/2023 0404    CO2 27 10/15/2023 0404    BUN 20 10/15/2023 0404    CREATININE " 1.1 10/15/2023 0404     (H) 10/15/2023 0404        Component Value Date/Time    CALCIUM 9.3 10/15/2023 0404    ALKPHOS 86 10/15/2023 0404    AST 24 10/15/2023 0404    ALT 66 (H) 10/15/2023 0404    BILITOT 0.2 10/15/2023 0404          Lab Results   Component Value Date    TSH 1.752 08/28/2023     Lab Results   Component Value Date    INR 1.0 08/28/2023     Lab Results   Component Value Date    WBC 17.76 (H) 10/15/2023    HGB 15.3 10/15/2023    HCT 46.9 10/15/2023    MCV 95 10/15/2023     10/15/2023     BMP  Sodium   Date Value Ref Range Status   10/15/2023 136 136 - 145 mmol/L Final     Potassium   Date Value Ref Range Status   10/15/2023 4.0 3.5 - 5.1 mmol/L Final     Chloride   Date Value Ref Range Status   10/15/2023 98 95 - 110 mmol/L Final     CO2   Date Value Ref Range Status   10/15/2023 27 23 - 29 mmol/L Final     BUN   Date Value Ref Range Status   10/15/2023 20 6 - 20 mg/dL Final     Creatinine   Date Value Ref Range Status   10/15/2023 1.1 0.5 - 1.4 mg/dL Final     Calcium   Date Value Ref Range Status   10/15/2023 9.3 8.7 - 10.5 mg/dL Final     Anion Gap   Date Value Ref Range Status   10/15/2023 11 8 - 16 mmol/L Final     CrCl cannot be calculated (Patient's most recent lab result is older than the maximum 7 days allowed.).    Assessment:     1. Coronary artery disease involving native coronary artery of native heart with unstable angina pectoris    2. Tobacco dependence    3. BMI 27.0-27.9,adult    4. S/P coronary artery stent placement    5. Chronic obstructive pulmonary disease, unspecified COPD type    6. Elevated fasting blood sugar    Has erectile dysfunction will stop b blockers switch to cardizem and  discussed unconditional smoking cessation   Cad s/p pci needs better diet compliance and meds compliance. Continue antiplatelets. And statins  Obesity has gained weight discussed diet compliance exercise.   Tobacco use discussed smoking cessation and  alternatives.   Copd with wheezing  discussed smoking cessation will stop b blockers and refer to pulmonary    Emphasized the importance of taking meds regularily on time and not skipping. Rf modification counsleing basil as well as discussed low carb and starches control.  Plan:   Continue current therapy  Cardiac low salt diet.  Risk factor modification and excercise program.  Smoking cessation counseling  F/u in 6 months with lipid cmp A1c  Labs soon also

## 2024-07-19 ENCOUNTER — LAB VISIT (OUTPATIENT)
Dept: LAB | Facility: HOSPITAL | Age: 49
End: 2024-07-19
Attending: INTERNAL MEDICINE
Payer: MEDICAID

## 2024-07-19 DIAGNOSIS — R73.01 ELEVATED FASTING BLOOD SUGAR: ICD-10-CM

## 2024-07-19 DIAGNOSIS — I25.110 CORONARY ARTERY DISEASE INVOLVING NATIVE CORONARY ARTERY OF NATIVE HEART WITH UNSTABLE ANGINA PECTORIS: ICD-10-CM

## 2024-07-19 LAB
ALBUMIN SERPL BCP-MCNC: 3.9 G/DL (ref 3.5–5.2)
ALP SERPL-CCNC: 90 U/L (ref 55–135)
ALT SERPL W/O P-5'-P-CCNC: 32 U/L (ref 10–44)
ANION GAP SERPL CALC-SCNC: 10 MMOL/L (ref 8–16)
AST SERPL-CCNC: 16 U/L (ref 10–40)
BILIRUB SERPL-MCNC: 0.6 MG/DL (ref 0.1–1)
BUN SERPL-MCNC: 17 MG/DL (ref 6–20)
CALCIUM SERPL-MCNC: 9.6 MG/DL (ref 8.7–10.5)
CHLORIDE SERPL-SCNC: 104 MMOL/L (ref 95–110)
CHOLEST SERPL-MCNC: 146 MG/DL (ref 120–199)
CHOLEST/HDLC SERPL: 3.8 {RATIO} (ref 2–5)
CO2 SERPL-SCNC: 26 MMOL/L (ref 23–29)
CREAT SERPL-MCNC: 1.1 MG/DL (ref 0.5–1.4)
EST. GFR  (NO RACE VARIABLE): >60 ML/MIN/1.73 M^2
ESTIMATED AVG GLUCOSE: 154 MG/DL (ref 68–131)
GLUCOSE SERPL-MCNC: 151 MG/DL (ref 70–110)
HBA1C MFR BLD: 7 % (ref 4–5.6)
HDLC SERPL-MCNC: 38 MG/DL (ref 40–75)
HDLC SERPL: 26 % (ref 20–50)
LDLC SERPL CALC-MCNC: 67.2 MG/DL (ref 63–159)
NONHDLC SERPL-MCNC: 108 MG/DL
POTASSIUM SERPL-SCNC: 4.2 MMOL/L (ref 3.5–5.1)
PROT SERPL-MCNC: 7.6 G/DL (ref 6–8.4)
SODIUM SERPL-SCNC: 140 MMOL/L (ref 136–145)
TRIGL SERPL-MCNC: 204 MG/DL (ref 30–150)

## 2024-07-19 PROCEDURE — 80061 LIPID PANEL: CPT | Performed by: INTERNAL MEDICINE

## 2024-07-19 PROCEDURE — 80053 COMPREHEN METABOLIC PANEL: CPT | Performed by: INTERNAL MEDICINE

## 2024-07-19 PROCEDURE — 83036 HEMOGLOBIN GLYCOSYLATED A1C: CPT | Performed by: INTERNAL MEDICINE

## 2024-07-19 PROCEDURE — 36415 COLL VENOUS BLD VENIPUNCTURE: CPT | Performed by: INTERNAL MEDICINE

## 2024-08-27 DIAGNOSIS — R06.2 WHEEZING: ICD-10-CM

## 2024-08-27 RX ORDER — ALBUTEROL SULFATE 90 UG/1
2 INHALANT RESPIRATORY (INHALATION) EVERY 6 HOURS PRN
Qty: 18 G | Refills: 0 | Status: SHIPPED | OUTPATIENT
Start: 2024-08-27 | End: 2025-08-27

## 2025-01-03 RX ORDER — METOPROLOL TARTRATE 50 MG/1
50 TABLET ORAL 2 TIMES DAILY
Qty: 60 TABLET | Refills: 11 | OUTPATIENT
Start: 2025-01-03 | End: 2026-01-03

## 2025-01-17 DIAGNOSIS — R73.01 ELEVATED FASTING BLOOD SUGAR: ICD-10-CM

## 2025-01-17 DIAGNOSIS — Z95.5 S/P CORONARY ARTERY STENT PLACEMENT: ICD-10-CM

## 2025-01-17 DIAGNOSIS — I25.110 CORONARY ARTERY DISEASE INVOLVING NATIVE CORONARY ARTERY OF NATIVE HEART WITH UNSTABLE ANGINA PECTORIS: ICD-10-CM

## 2025-01-17 RX ORDER — DILTIAZEM HYDROCHLORIDE 180 MG/1
180 CAPSULE, COATED, EXTENDED RELEASE ORAL DAILY
Qty: 90 CAPSULE | Refills: 3 | Status: SHIPPED | OUTPATIENT
Start: 2025-01-17 | End: 2026-01-17

## 2025-01-17 RX ORDER — ATORVASTATIN CALCIUM 80 MG/1
TABLET, FILM COATED ORAL
Qty: 90 TABLET | Refills: 3 | Status: SHIPPED | OUTPATIENT
Start: 2025-01-17

## 2025-01-17 RX ORDER — LOSARTAN POTASSIUM 25 MG/1
25 TABLET ORAL DAILY
Qty: 90 TABLET | Refills: 3 | Status: SHIPPED | OUTPATIENT
Start: 2025-01-17 | End: 2026-01-17

## 2025-01-17 NOTE — TELEPHONE ENCOUNTER
Called patient [phone] and was able o clean up his med list and add refills for all meds from Cardio.  He has an appt 1/24/25       ----- Message from Med Assistant Lucia sent at 1/17/2025  8:39 AM CST -----  Contact: Saywer Jacques stated he is out of the the medication he takes that goes with the Brilanta. He stated the pharmacy wouldn't refill it. Please call him at [phone]      Pharmacy:   [facility] [address] [place] [address] [place] [id number]  Phone: [phone] Fax: [phone]

## 2025-01-24 ENCOUNTER — TELEPHONE (OUTPATIENT)
Dept: CARDIOLOGY | Facility: CLINIC | Age: 50
End: 2025-01-24
Payer: COMMERCIAL

## 2025-01-24 DIAGNOSIS — I25.110 CORONARY ARTERY DISEASE INVOLVING NATIVE CORONARY ARTERY OF NATIVE HEART WITH UNSTABLE ANGINA PECTORIS: Primary | ICD-10-CM

## 2025-01-24 NOTE — TELEPHONE ENCOUNTER
----- Message from Alivia sent at 1/21/2025  4:46 PM CST -----  .Type:  Patient Requesting Call    Who Called:ALFREDO CHERRY [[id number]]  Does the patient know what this is regarding?: Patient requesting a callback to reschedule his appointment and also needs lab orders put back in the system  Would the patient rather a call back or a response via [facility]? Call back  Best Call Back Number:.[phone] ([place])    Additional Information:

## 2025-01-25 ENCOUNTER — TELEPHONE (OUTPATIENT)
Dept: CARDIOLOGY | Facility: CLINIC | Age: 50
End: 2025-01-25
Payer: COMMERCIAL

## 2025-01-25 NOTE — TELEPHONE ENCOUNTER
Called [phone] and spoke with patient.  I spoke with him about his insurance and it being out of network

## 2025-01-25 NOTE — TELEPHONE ENCOUNTER
ROSIE to call clinic to schedule EKG----- Message from Fatuma sent at 1/25/2025  8:10 AM CST -----  Contact: Sawyer  Patient unable to make today's EKG and office visit. Please contact pt at .[phone] to reschedule.

## 2025-01-25 NOTE — TELEPHONE ENCOUNTER
Contacted PT to reschedule apt with provider and a EKG PT stated he had a feer for 2 days and coughing up stuff, I informed PT that he should see his PCP before seeing cardiologist and he then stated his fever broke last night and coughed all the stuff out      ----- Message from Joanna sent at 1/25/2025 11:39 AM CST -----  Type:  Patient Returning Call    Who Called:Pt   Who Left Message for Patient:Sarah   Does the patient know what this is regarding?:yes   Would the patient rather a call back or a response via [facility]? Call back  Best Call Back Number:[phone]  Additional Information:

## 2025-07-13 ENCOUNTER — HOSPITAL ENCOUNTER (EMERGENCY)
Facility: HOSPITAL | Age: 50
Discharge: HOME | End: 2025-07-13
Attending: FAMILY MEDICINE
Payer: COMMERCIAL

## 2025-07-13 VITALS
HEART RATE: 82 BPM | SYSTOLIC BLOOD PRESSURE: 124 MMHG | OXYGEN SATURATION: 94 % | DIASTOLIC BLOOD PRESSURE: 84 MMHG | WEIGHT: 173.75 LBS | BODY MASS INDEX: 24.93 KG/M2 | TEMPERATURE: 99 F

## 2025-07-13 DIAGNOSIS — S51.812A LACERATION OF LEFT FOREARM, INITIAL ENCOUNTER: Primary | ICD-10-CM

## 2025-07-13 PROCEDURE — 99284 EMERGENCY DEPT VISIT MOD MDM: CPT | Mod: 25

## 2025-07-13 PROCEDURE — 90471 IMMUNIZATION ADMIN: CPT

## 2025-07-13 PROCEDURE — 90715 TDAP VACCINE 7 YRS/> IM: CPT

## 2025-07-13 PROCEDURE — 12001 RPR S/N/AX/GEN/TRNK 2.5CM/<: CPT

## 2025-07-13 PROCEDURE — 63600175 PHARM REV CODE 636 W HCPCS

## 2025-07-13 RX ADMIN — CLOSTRIDIUM TETANI TOXOID ANTIGEN (FORMALDEHYDE INACTIVATED), CORYNEBACTERIUM DIPHTHERIAE TOXOID ANTIGEN (FORMALDEHYDE INACTIVATED), BORDETELLA PERTUSSIS TOXOID ANTIGEN (GLUTARALDEHYDE INACTIVATED), BORDETELLA PERTUSSIS FILAMENTOUS HEMAGGLUTININ ANTIGEN (FORMALDEHYDE INACTIVATED), BORDETELLA PERTUSSIS PERTACTIN ANTIGEN, AND BORDETELLA PERTUSSIS FIMBRIAE 2/3 ANTIGEN 0.5 ML: 5; 2; 2.5; 5; 3; 5 INJECTION, SUSPENSION INTRAMUSCULAR at 09:07

## 2025-07-14 NOTE — ED PROVIDER NOTES
Encounter Date: 7/13/2025       History     Chief Complaint   Patient presents with    Laceration     Pt. C/o accidentally cutting his left wrist with a piece of broken glass. Pt covered wound with paper towel and bleeding is controled      50-year-old male presenting to the emergency department after sustaining small laceration to dorsal side of the left wrist which he sustained approximately 20 minutes prior to arrival.  Patient states that there was a broken jar in a box; when he was moving into the trash can, he accidentally drops in the broken glass and it caused a small laceration on his wrist.  Patient reports applying immediate pressure to it.  Bleeding is controlled.  No obvious foreign body.  Laceration was accidental; patient has no thoughts of harming himself or others.  No suicidal ideation.  No other complaints or concerns at this time.  Patient not up-to-date on tetanus.    The history is provided by the patient.     Review of patient's allergies indicates:  No Known Allergies  Past Medical History:   Diagnosis Date    Coronary artery disease involving native coronary artery of native heart with unstable angina pectoris 8/28/2023    ST elevation myocardial infarction involving right coronary artery 8/28/2023    Tobacco use 8/28/2023     Past Surgical History:   Procedure Laterality Date    LEFT HEART CATHETERIZATION Left 8/28/2023    Procedure: Left heart cath;  Surgeon: Leola Dodson MD;  Location: [facility] CATH LAB;  Service: Cardiology;  Laterality: Left;    PERCUTANEOUS CORONARY INTERVENTION, ARTERY N/A 8/28/2023    Procedure: Percutaneous coronary intervention;  Surgeon: Leola Dodson MD;  Location: [facility] CATH LAB;  Service: Cardiology;  Laterality: N/A;    STENT, DRUG ELUTING, SINGLE VESSEL, CORONARY  8/28/2023    Procedure: Stent, Drug Eluting, Single Vessel, Coronary;  Surgeon: Leola Dodson MD;  Location: [facility] CATH LAB;  Service: Cardiology;;    THROMBECTOMY, CORONARY  8/28/2023     Procedure: Thrombectomy, Coronary;  Surgeon: Leola Dodson MD;  Location: [facility] CATH LAB;  Service: Cardiology;;  Using aspiration catheter     Family History   Problem Relation Name Age of Onset    No Known Problems Mother      No Known Problems Father       Social History[1]  Review of Systems   Constitutional:  Negative for fever.   HENT:  Negative for sore throat.    Respiratory:  Negative for shortness of breath.    Cardiovascular:  Negative for chest pain.   Gastrointestinal:  Negative for nausea.   Genitourinary:  Negative for dysuria.   Musculoskeletal:  Negative for back pain.   Skin:  Positive for wound. Negative for rash.   Neurological:  Negative for weakness.   Hematological:  Does not bruise/bleed easily.   All other systems reviewed and are negative.      Physical Exam     Initial Vitals [07/13/25 2032]   BP Pulse Resp Temp SpO2   131/70 90 18 98.6 °F (37 °C) (!) 94 %      MAP       --         Physical Exam    Constitutional: Vital signs are normal. He appears well-developed and well-nourished. He is active.  Non-toxic appearance. He does not have a sickly appearance. He does not appear ill. No distress.   HENT:   Head: Normocephalic and atraumatic.   Right Ear: Hearing normal.   Left Ear: Hearing normal.   Nose: Nose normal. Mouth/Throat: Uvula is midline, oropharynx is clear and moist and mucous membranes are normal.   Eyes: Conjunctivae, EOM and lids are normal. Pupils are equal, round, and reactive to light.   Neck: Trachea normal. Neck supple.   Normal range of motion.   Full passive range of motion without pain.     Cardiovascular:  Normal rate, regular rhythm, normal heart sounds, intact distal pulses and normal pulses.           Pulmonary/Chest: Effort normal and breath sounds normal.   Abdominal: Abdomen is soft and flat. Bowel sounds are normal. He exhibits no distension. There is no abdominal tenderness.   Musculoskeletal:         General: Normal range of motion.      Cervical back: Full  passive range of motion without pain, normal range of motion and neck supple.     Neurological: He is alert and oriented to person, place, and time. He has normal strength and normal reflexes. No cranial nerve deficit. GCS score is 15. GCS eye subscore is 4. GCS verbal subscore is 5. GCS motor subscore is 6.   Skin: Skin is warm and dry. No rash noted.   2 cm superficial laceration noted to the dorsal left wrist.  No foreign body.  Bleeding is well controlled.  No purulence, crusting, or signs of infection.   Psychiatric: He has a normal mood and affect. His behavior is normal. Thought content normal.         ED Course   Lac Repair    Date/Time: 7/13/2025 9:36 PM    Performed by: Maida Conn PA-C  Authorized by: Yasmany Ott MD    Consent:     Consent obtained:  Verbal    Consent given by:  Patient    Risks, benefits, and alternatives were discussed: yes      Risks discussed:  Infection, pain and poor cosmetic result  Universal protocol:     Patient identity confirmed:  Verbally with patient  Anesthesia:     Anesthesia method:  None  Laceration details:     Location:  Shoulder/arm    Shoulder/arm location:  L lower arm    Length (cm):  2  Exploration:     Hemostasis achieved with:  Direct pressure    Wound exploration: wound explored through full range of motion and entire depth of wound visualized    Treatment:     Area cleansed with:  Saline    Amount of cleaning:  Standard    Irrigation solution:  Sterile saline    Irrigation volume:  250    Irrigation method:  Syringe  Skin repair:     Repair method:  Tissue adhesive  Approximation:     Approximation:  Close  Repair type:     Repair type:  Simple  Post-procedure details:     Dressing:  Non-adherent dressing and sterile dressing    Procedure completion:  Tolerated well, no immediate complications    Labs Reviewed - No data to display       Imaging Results    None          Medications   Tdap vaccine injection 0.5 mL (has no administration in time  range)     Medical Decision Making  Risk  Prescription drug management.  Risk Details: 50-year-old male presenting to the emergency department with small laceration noted to the left forearm/wrist which he sustained just prior to arrival.  Accidental laceration after moving broken glass to the garbage can.  Wound was cleansed with sterile water.  No foreign bodies noted.  Wound was closed with Dermabond.  Keep the area dry and clean for the next 24 hours.  After 24 hours, patient may gently wash the area with soap and water, but avoid scrubbing and soaking; glue will fall off naturally in the next 5-10 days.  Do not pick at or pale the glue.  Avoid applying ointments, creams, or bandages over the site.  Watch for signs of infection including increased redness, swelling, warmth, pus, or worsening pain.  If any of these occur or if the wound way opens, patient was instructed to return to the emergency department or contact his primary care provider.  Patient verbalizes understanding.  All questions answered at this time.  Patient is stable for discharge.    I discussed with patient that the evaluation in the emergency department does not suggest any emergent or life threatening medical condition requiring immediate intervention beyond what was provided in the ED, and I believe patient is safe for discharge.  Regardless, an unremarkable evaluation in the ED does not preclude the development or presence of a serious of life threatening condition. As such, patient was instructed to return immediately for any worsening or change in current symptoms. I also discussed the results of my evaluation and diagnostics with patient and he concurs with the evaluation and management plan.  Detailed written and verbal instructions provided to patient and he expressed a verbal understanding of the discharge instructions and overall management plan. Reiterated the importance of medication administration and safety and advised patient  to follow up with primary care provider in 3-5 days or sooner if needed.  Also advised patient to return to the ER for any complications.                                            Clinical Impression:  Final diagnoses:  [F36.529A] Laceration of left forearm, initial encounter (Primary)          ED Disposition Condition    Discharge Stable          ED Prescriptions    None       Follow-up Information       Follow up With Specialties Details Why Contact Info    [facility] - Emergency Dept. Emergency Medicine  If symptoms worsen [address] [facility] [address] [phone]                   [1]   Social History  Tobacco Use    Smoking status: Never    Smokeless tobacco: Never   Substance Use Topics    Drug use: Never        Maida Conn PA-C  07/13/25 1781

## (undated) DEVICE — GUIDE LAUNCHER 6FR JR 4.0

## (undated) DEVICE — WIRE X-SUP CHOICE PT .014X182

## (undated) DEVICE — PACK ANGIOPLASTY ACCESS PLUS

## (undated) DEVICE — INFLATOR ENCORE 26 BLLN INFL

## (undated) DEVICE — ANGIOTOUCH KIT

## (undated) DEVICE — CATH NC EMERGE MR 3.75X15X143

## (undated) DEVICE — CATH EMERGE MR 20 X 3.00

## (undated) DEVICE — WIRE GUIDE TEFLON 3CM .035 145

## (undated) DEVICE — KIT SYR REUSABLE

## (undated) DEVICE — PACK HEART CATH BR

## (undated) DEVICE — OMNIPAQUE 300MG 150ML VIAL

## (undated) DEVICE — CATH EXPORT ASPIRATION 6FR

## (undated) DEVICE — NDL PERCUTANEOUS 21G 7CM VASC

## (undated) DEVICE — KIT WATCHDOG HEMSTAS VALVE 8FR

## (undated) DEVICE — CATH IMPULSE PIGTAIL 6F 110CM

## (undated) DEVICE — SHEATH INTRODUCER 6FR 11CM

## (undated) DEVICE — CATH DIAG IMPULSE 6FR FL4

## (undated) DEVICE — KIT MANIFOLD LOW PRESS TUBING